# Patient Record
Sex: MALE | Race: WHITE | NOT HISPANIC OR LATINO | URBAN - METROPOLITAN AREA
[De-identification: names, ages, dates, MRNs, and addresses within clinical notes are randomized per-mention and may not be internally consistent; named-entity substitution may affect disease eponyms.]

---

## 2023-10-22 ENCOUNTER — INPATIENT (INPATIENT)
Facility: HOSPITAL | Age: 78
LOS: 8 days | Discharge: NURSING, REHAB OR LONG-TERM CARE | DRG: 853 | End: 2023-10-31
Attending: STUDENT IN AN ORGANIZED HEALTH CARE EDUCATION/TRAINING PROGRAM | Admitting: STUDENT IN AN ORGANIZED HEALTH CARE EDUCATION/TRAINING PROGRAM
Payer: MEDICARE

## 2023-10-22 VITALS
OXYGEN SATURATION: 100 % | WEIGHT: 185.19 LBS | DIASTOLIC BLOOD PRESSURE: 64 MMHG | HEART RATE: 72 BPM | RESPIRATION RATE: 24 BRPM | SYSTOLIC BLOOD PRESSURE: 129 MMHG

## 2023-10-22 DIAGNOSIS — N17.9 ACUTE KIDNEY FAILURE, UNSPECIFIED: ICD-10-CM

## 2023-10-22 LAB
A1C WITH ESTIMATED AVERAGE GLUCOSE RESULT: 5.9 % — HIGH (ref 4–5.6)
A1C WITH ESTIMATED AVERAGE GLUCOSE RESULT: 5.9 % — HIGH (ref 4–5.6)
ALBUMIN SERPL ELPH-MCNC: 2.5 G/DL — LOW (ref 3.5–5)
ALBUMIN SERPL ELPH-MCNC: 2.5 G/DL — LOW (ref 3.5–5)
ALBUMIN SERPL ELPH-MCNC: 3 G/DL — LOW (ref 3.5–5)
ALBUMIN SERPL ELPH-MCNC: 3 G/DL — LOW (ref 3.5–5)
ALP SERPL-CCNC: 85 U/L — SIGNIFICANT CHANGE UP (ref 40–120)
ALP SERPL-CCNC: 85 U/L — SIGNIFICANT CHANGE UP (ref 40–120)
ALP SERPL-CCNC: 89 U/L — SIGNIFICANT CHANGE UP (ref 40–120)
ALP SERPL-CCNC: 89 U/L — SIGNIFICANT CHANGE UP (ref 40–120)
ALT FLD-CCNC: 37 U/L DA — SIGNIFICANT CHANGE UP (ref 10–60)
ALT FLD-CCNC: 37 U/L DA — SIGNIFICANT CHANGE UP (ref 10–60)
ALT FLD-CCNC: 49 U/L DA — SIGNIFICANT CHANGE UP (ref 10–60)
ALT FLD-CCNC: 49 U/L DA — SIGNIFICANT CHANGE UP (ref 10–60)
AMPHET UR-MCNC: NEGATIVE — SIGNIFICANT CHANGE UP
AMPHET UR-MCNC: NEGATIVE — SIGNIFICANT CHANGE UP
ANION GAP SERPL CALC-SCNC: 24 MMOL/L — HIGH (ref 5–17)
ANION GAP SERPL CALC-SCNC: 24 MMOL/L — HIGH (ref 5–17)
ANION GAP SERPL CALC-SCNC: 32 MMOL/L — HIGH (ref 5–17)
APPEARANCE UR: CLEAR — SIGNIFICANT CHANGE UP
APPEARANCE UR: CLEAR — SIGNIFICANT CHANGE UP
APTT 50/50 2HOUR INCUB: 32.9 SEC — SIGNIFICANT CHANGE UP (ref 24.5–36.6)
APTT 50/50 2HOUR INCUB: 32.9 SEC — SIGNIFICANT CHANGE UP (ref 24.5–36.6)
APTT BLD: 32.4 SEC — SIGNIFICANT CHANGE UP (ref 24.5–36.6)
APTT BLD: 32.4 SEC — SIGNIFICANT CHANGE UP (ref 24.5–36.6)
APTT BLD: 36.4 SEC — HIGH (ref 24.5–35.6)
APTT BLD: 36.4 SEC — HIGH (ref 24.5–35.6)
APTT BLD: 36.8 SEC — HIGH (ref 24.5–35.6)
APTT BLD: 36.8 SEC — HIGH (ref 24.5–35.6)
APTT BLD: 37.5 SEC — HIGH (ref 24.5–35.6)
APTT BLD: 37.5 SEC — HIGH (ref 24.5–35.6)
AST SERPL-CCNC: 31 U/L — SIGNIFICANT CHANGE UP (ref 10–40)
AST SERPL-CCNC: 31 U/L — SIGNIFICANT CHANGE UP (ref 10–40)
AST SERPL-CCNC: 54 U/L — HIGH (ref 10–40)
AST SERPL-CCNC: 54 U/L — HIGH (ref 10–40)
BARBITURATES UR SCN-MCNC: NEGATIVE — SIGNIFICANT CHANGE UP
BARBITURATES UR SCN-MCNC: NEGATIVE — SIGNIFICANT CHANGE UP
BASE EXCESS BLDA CALC-SCNC: -7.3 MMOL/L — LOW (ref -2–3)
BASE EXCESS BLDA CALC-SCNC: -7.3 MMOL/L — LOW (ref -2–3)
BASE EXCESS BLDV CALC-SCNC: -18.7 MMOL/L — SIGNIFICANT CHANGE UP
BASE EXCESS BLDV CALC-SCNC: -18.7 MMOL/L — SIGNIFICANT CHANGE UP
BASE EXCESS BLDV CALC-SCNC: -23.3 MMOL/L — SIGNIFICANT CHANGE UP
BASE EXCESS BLDV CALC-SCNC: -23.3 MMOL/L — SIGNIFICANT CHANGE UP
BASE EXCESS BLDV CALC-SCNC: SIGNIFICANT CHANGE UP MMOL/L
BASE EXCESS BLDV CALC-SCNC: SIGNIFICANT CHANGE UP MMOL/L
BASOPHILS # BLD AUTO: 0.03 K/UL — SIGNIFICANT CHANGE UP (ref 0–0.2)
BASOPHILS # BLD AUTO: 0.03 K/UL — SIGNIFICANT CHANGE UP (ref 0–0.2)
BASOPHILS NFR BLD AUTO: 0.2 % — SIGNIFICANT CHANGE UP (ref 0–2)
BASOPHILS NFR BLD AUTO: 0.2 % — SIGNIFICANT CHANGE UP (ref 0–2)
BENZODIAZ UR-MCNC: NEGATIVE — SIGNIFICANT CHANGE UP
BENZODIAZ UR-MCNC: NEGATIVE — SIGNIFICANT CHANGE UP
BILIRUB SERPL-MCNC: 0.4 MG/DL — SIGNIFICANT CHANGE UP (ref 0.2–1.2)
BILIRUB SERPL-MCNC: 0.4 MG/DL — SIGNIFICANT CHANGE UP (ref 0.2–1.2)
BILIRUB SERPL-MCNC: 0.6 MG/DL — SIGNIFICANT CHANGE UP (ref 0.2–1.2)
BILIRUB SERPL-MCNC: 0.6 MG/DL — SIGNIFICANT CHANGE UP (ref 0.2–1.2)
BILIRUB UR-MCNC: NEGATIVE — SIGNIFICANT CHANGE UP
BILIRUB UR-MCNC: NEGATIVE — SIGNIFICANT CHANGE UP
BLD GP AB SCN SERPL QL: SIGNIFICANT CHANGE UP
BLD GP AB SCN SERPL QL: SIGNIFICANT CHANGE UP
BLOOD GAS COMMENTS ARTERIAL: SIGNIFICANT CHANGE UP
BLOOD GAS COMMENTS ARTERIAL: SIGNIFICANT CHANGE UP
BUN SERPL-MCNC: 121 MG/DL — HIGH (ref 7–18)
BUN SERPL-MCNC: 121 MG/DL — HIGH (ref 7–18)
BUN SERPL-MCNC: 206 MG/DL — HIGH (ref 7–18)
BUN SERPL-MCNC: 206 MG/DL — HIGH (ref 7–18)
BUN SERPL-MCNC: 216 MG/DL — HIGH (ref 7–18)
BUN SERPL-MCNC: 216 MG/DL — HIGH (ref 7–18)
CA-I SERPL-SCNC: SIGNIFICANT CHANGE UP MMOL/L (ref 1.15–1.33)
CA-I SERPL-SCNC: SIGNIFICANT CHANGE UP MMOL/L (ref 1.15–1.33)
CALCIUM SERPL-MCNC: 8.2 MG/DL — LOW (ref 8.4–10.5)
CHLORIDE SERPL-SCNC: 104 MMOL/L — SIGNIFICANT CHANGE UP (ref 96–108)
CHLORIDE SERPL-SCNC: 104 MMOL/L — SIGNIFICANT CHANGE UP (ref 96–108)
CHLORIDE SERPL-SCNC: 95 MMOL/L — LOW (ref 96–108)
CHLORIDE SERPL-SCNC: 95 MMOL/L — LOW (ref 96–108)
CHLORIDE SERPL-SCNC: 96 MMOL/L — SIGNIFICANT CHANGE UP (ref 96–108)
CHLORIDE SERPL-SCNC: 96 MMOL/L — SIGNIFICANT CHANGE UP (ref 96–108)
CO2 SERPL-SCNC: 15 MMOL/L — LOW (ref 22–31)
CO2 SERPL-SCNC: 15 MMOL/L — LOW (ref 22–31)
CO2 SERPL-SCNC: 3 MMOL/L — CRITICAL LOW (ref 22–31)
CO2 SERPL-SCNC: 3 MMOL/L — CRITICAL LOW (ref 22–31)
CO2 SERPL-SCNC: 5 MMOL/L — CRITICAL LOW (ref 22–31)
CO2 SERPL-SCNC: 5 MMOL/L — CRITICAL LOW (ref 22–31)
COCAINE METAB.OTHER UR-MCNC: NEGATIVE — SIGNIFICANT CHANGE UP
COCAINE METAB.OTHER UR-MCNC: NEGATIVE — SIGNIFICANT CHANGE UP
COLOR SPEC: YELLOW — SIGNIFICANT CHANGE UP
COLOR SPEC: YELLOW — SIGNIFICANT CHANGE UP
CREAT SERPL-MCNC: 11.8 MG/DL — HIGH (ref 0.5–1.3)
CREAT SERPL-MCNC: 11.8 MG/DL — HIGH (ref 0.5–1.3)
CREAT SERPL-MCNC: 12.6 MG/DL — HIGH (ref 0.5–1.3)
CREAT SERPL-MCNC: 12.6 MG/DL — HIGH (ref 0.5–1.3)
CREAT SERPL-MCNC: 6.36 MG/DL — HIGH (ref 0.5–1.3)
CREAT SERPL-MCNC: 6.36 MG/DL — HIGH (ref 0.5–1.3)
CRP SERPL-MCNC: 109 MG/L — HIGH
CRP SERPL-MCNC: 109 MG/L — HIGH
DIFF PNL FLD: ABNORMAL
DIFF PNL FLD: ABNORMAL
EGFR: 4 ML/MIN/1.73M2 — LOW
EGFR: 8 ML/MIN/1.73M2 — LOW
EGFR: 8 ML/MIN/1.73M2 — LOW
EOSINOPHIL # BLD AUTO: 0 K/UL — SIGNIFICANT CHANGE UP (ref 0–0.5)
EOSINOPHIL # BLD AUTO: 0 K/UL — SIGNIFICANT CHANGE UP (ref 0–0.5)
EOSINOPHIL NFR BLD AUTO: 0 % — SIGNIFICANT CHANGE UP (ref 0–6)
EOSINOPHIL NFR BLD AUTO: 0 % — SIGNIFICANT CHANGE UP (ref 0–6)
ERYTHROCYTE [SEDIMENTATION RATE] IN BLOOD: 27 MM/HR — HIGH (ref 0–20)
ERYTHROCYTE [SEDIMENTATION RATE] IN BLOOD: 27 MM/HR — HIGH (ref 0–20)
ESTIMATED AVERAGE GLUCOSE: 123 MG/DL — HIGH (ref 68–114)
ESTIMATED AVERAGE GLUCOSE: 123 MG/DL — HIGH (ref 68–114)
GAS PNL BLDA: SIGNIFICANT CHANGE UP
GAS PNL BLDV: 129 MMOL/L — LOW (ref 136–145)
GAS PNL BLDV: 129 MMOL/L — LOW (ref 136–145)
GAS PNL BLDV: SIGNIFICANT CHANGE UP
GLUCOSE SERPL-MCNC: 110 MG/DL — HIGH (ref 70–99)
GLUCOSE SERPL-MCNC: 110 MG/DL — HIGH (ref 70–99)
GLUCOSE SERPL-MCNC: 217 MG/DL — HIGH (ref 70–99)
GLUCOSE SERPL-MCNC: 217 MG/DL — HIGH (ref 70–99)
GLUCOSE SERPL-MCNC: 241 MG/DL — HIGH (ref 70–99)
GLUCOSE SERPL-MCNC: 241 MG/DL — HIGH (ref 70–99)
GLUCOSE UR QL: NEGATIVE MG/DL — SIGNIFICANT CHANGE UP
GLUCOSE UR QL: NEGATIVE MG/DL — SIGNIFICANT CHANGE UP
HAV IGM SER-ACNC: SIGNIFICANT CHANGE UP
HAV IGM SER-ACNC: SIGNIFICANT CHANGE UP
HBV CORE IGM SER-ACNC: SIGNIFICANT CHANGE UP
HBV CORE IGM SER-ACNC: SIGNIFICANT CHANGE UP
HBV SURFACE AG SER-ACNC: SIGNIFICANT CHANGE UP
HBV SURFACE AG SER-ACNC: SIGNIFICANT CHANGE UP
HCO3 BLDA-SCNC: 13 MMOL/L — LOW (ref 21–28)
HCO3 BLDA-SCNC: 13 MMOL/L — LOW (ref 21–28)
HCO3 BLDV-SCNC: 6 MMOL/L — CRITICAL LOW (ref 22–29)
HCO3 BLDV-SCNC: 6 MMOL/L — CRITICAL LOW (ref 22–29)
HCO3 BLDV-SCNC: 8 MMOL/L — CRITICAL LOW (ref 22–29)
HCO3 BLDV-SCNC: 8 MMOL/L — CRITICAL LOW (ref 22–29)
HCO3 BLDV-SCNC: SIGNIFICANT CHANGE UP MMOL/L (ref 22–29)
HCO3 BLDV-SCNC: SIGNIFICANT CHANGE UP MMOL/L (ref 22–29)
HCT VFR BLD CALC: 28.8 % — LOW (ref 39–50)
HCT VFR BLD CALC: 28.8 % — LOW (ref 39–50)
HCT VFR BLD CALC: 34 % — LOW (ref 39–50)
HCT VFR BLD CALC: 34 % — LOW (ref 39–50)
HCV AB S/CO SERPL IA: 0.1 S/CO — SIGNIFICANT CHANGE UP (ref 0–0.99)
HCV AB S/CO SERPL IA: 0.1 S/CO — SIGNIFICANT CHANGE UP (ref 0–0.99)
HCV AB SERPL-IMP: SIGNIFICANT CHANGE UP
HCV AB SERPL-IMP: SIGNIFICANT CHANGE UP
HGB BLD-MCNC: 10.3 G/DL — LOW (ref 13–17)
HGB BLD-MCNC: 10.3 G/DL — LOW (ref 13–17)
HGB BLD-MCNC: 11.2 G/DL — LOW (ref 13–17)
HGB BLD-MCNC: 11.2 G/DL — LOW (ref 13–17)
HIV 1+2 AB+HIV1 P24 AG SERPL QL IA: SIGNIFICANT CHANGE UP
HIV 1+2 AB+HIV1 P24 AG SERPL QL IA: SIGNIFICANT CHANGE UP
HOROWITZ INDEX BLDA+IHG-RTO: 21 — SIGNIFICANT CHANGE UP
HOROWITZ INDEX BLDA+IHG-RTO: 21 — SIGNIFICANT CHANGE UP
IMM GRANULOCYTES NFR BLD AUTO: 1.7 % — HIGH (ref 0–0.9)
IMM GRANULOCYTES NFR BLD AUTO: 1.7 % — HIGH (ref 0–0.9)
INR BLD: 1.28 RATIO — HIGH (ref 0.85–1.18)
INR BLD: 1.28 RATIO — HIGH (ref 0.85–1.18)
KETONES UR-MCNC: NEGATIVE MG/DL — SIGNIFICANT CHANGE UP
KETONES UR-MCNC: NEGATIVE MG/DL — SIGNIFICANT CHANGE UP
LACTATE BLDV-MCNC: 7.3 MMOL/L — CRITICAL HIGH (ref 0.5–2)
LACTATE BLDV-MCNC: 7.3 MMOL/L — CRITICAL HIGH (ref 0.5–2)
LACTATE SERPL-SCNC: 3.1 MMOL/L — HIGH (ref 0.7–2)
LACTATE SERPL-SCNC: 3.1 MMOL/L — HIGH (ref 0.7–2)
LACTATE SERPL-SCNC: 3.8 MMOL/L — HIGH (ref 0.7–2)
LACTATE SERPL-SCNC: 3.8 MMOL/L — HIGH (ref 0.7–2)
LACTATE SERPL-SCNC: 7.5 MMOL/L — CRITICAL HIGH (ref 0.7–2)
LACTATE SERPL-SCNC: 7.5 MMOL/L — CRITICAL HIGH (ref 0.7–2)
LEUKOCYTE ESTERASE UR-ACNC: ABNORMAL
LEUKOCYTE ESTERASE UR-ACNC: ABNORMAL
LYMPHOCYTES # BLD AUTO: 0.59 K/UL — LOW (ref 1–3.3)
LYMPHOCYTES # BLD AUTO: 0.59 K/UL — LOW (ref 1–3.3)
LYMPHOCYTES # BLD AUTO: 3 % — LOW (ref 13–44)
LYMPHOCYTES # BLD AUTO: 3 % — LOW (ref 13–44)
MCHC RBC-ENTMCNC: 28.7 PG — SIGNIFICANT CHANGE UP (ref 27–34)
MCHC RBC-ENTMCNC: 28.7 PG — SIGNIFICANT CHANGE UP (ref 27–34)
MCHC RBC-ENTMCNC: 28.8 PG — SIGNIFICANT CHANGE UP (ref 27–34)
MCHC RBC-ENTMCNC: 28.8 PG — SIGNIFICANT CHANGE UP (ref 27–34)
MCHC RBC-ENTMCNC: 32.9 GM/DL — SIGNIFICANT CHANGE UP (ref 32–36)
MCHC RBC-ENTMCNC: 32.9 GM/DL — SIGNIFICANT CHANGE UP (ref 32–36)
MCHC RBC-ENTMCNC: 35.8 GM/DL — SIGNIFICANT CHANGE UP (ref 32–36)
MCHC RBC-ENTMCNC: 35.8 GM/DL — SIGNIFICANT CHANGE UP (ref 32–36)
MCV RBC AUTO: 80.2 FL — SIGNIFICANT CHANGE UP (ref 80–100)
MCV RBC AUTO: 80.2 FL — SIGNIFICANT CHANGE UP (ref 80–100)
MCV RBC AUTO: 87.4 FL — SIGNIFICANT CHANGE UP (ref 80–100)
MCV RBC AUTO: 87.4 FL — SIGNIFICANT CHANGE UP (ref 80–100)
METHADONE UR-MCNC: NEGATIVE — SIGNIFICANT CHANGE UP
METHADONE UR-MCNC: NEGATIVE — SIGNIFICANT CHANGE UP
MONOCYTES # BLD AUTO: 0.6 K/UL — SIGNIFICANT CHANGE UP (ref 0–0.9)
MONOCYTES # BLD AUTO: 0.6 K/UL — SIGNIFICANT CHANGE UP (ref 0–0.9)
MONOCYTES NFR BLD AUTO: 3 % — SIGNIFICANT CHANGE UP (ref 2–14)
MONOCYTES NFR BLD AUTO: 3 % — SIGNIFICANT CHANGE UP (ref 2–14)
MRSA PCR RESULT.: SIGNIFICANT CHANGE UP
MRSA PCR RESULT.: SIGNIFICANT CHANGE UP
NEUTROPHILS # BLD AUTO: 18.41 K/UL — HIGH (ref 1.8–7.4)
NEUTROPHILS # BLD AUTO: 18.41 K/UL — HIGH (ref 1.8–7.4)
NEUTROPHILS NFR BLD AUTO: 92.1 % — HIGH (ref 43–77)
NEUTROPHILS NFR BLD AUTO: 92.1 % — HIGH (ref 43–77)
NITRITE UR-MCNC: NEGATIVE — SIGNIFICANT CHANGE UP
NITRITE UR-MCNC: NEGATIVE — SIGNIFICANT CHANGE UP
NRBC # BLD: 0 /100 WBCS — SIGNIFICANT CHANGE UP (ref 0–0)
OPIATES UR-MCNC: NEGATIVE — SIGNIFICANT CHANGE UP
OPIATES UR-MCNC: NEGATIVE — SIGNIFICANT CHANGE UP
OSMOLALITY SERPL: 375 MOSMOL/KG — HIGH (ref 280–301)
OSMOLALITY SERPL: 375 MOSMOL/KG — HIGH (ref 280–301)
OSMOLALITY UR: 361 MOS/KG — SIGNIFICANT CHANGE UP (ref 50–1200)
OSMOLALITY UR: 361 MOS/KG — SIGNIFICANT CHANGE UP (ref 50–1200)
PAT CTL 2H: 32 SEC — SIGNIFICANT CHANGE UP (ref 24.5–36.6)
PAT CTL 2H: 32 SEC — SIGNIFICANT CHANGE UP (ref 24.5–36.6)
PCO2 BLDA: 16 MMHG — LOW (ref 35–48)
PCO2 BLDA: 16 MMHG — LOW (ref 35–48)
PCO2 BLDV: 17 MMHG — LOW (ref 42–55)
PCO2 BLDV: 17 MMHG — LOW (ref 42–55)
PCO2 BLDV: 23 MMHG — LOW (ref 42–55)
PCP SPEC-MCNC: SIGNIFICANT CHANGE UP
PCP SPEC-MCNC: SIGNIFICANT CHANGE UP
PCP UR-MCNC: NEGATIVE — SIGNIFICANT CHANGE UP
PCP UR-MCNC: NEGATIVE — SIGNIFICANT CHANGE UP
PH BLDA: 7.51 — HIGH (ref 7.35–7.45)
PH BLDA: 7.51 — HIGH (ref 7.35–7.45)
PH BLDV: 7.03 — LOW (ref 7.32–7.43)
PH BLDV: 7.03 — LOW (ref 7.32–7.43)
PH BLDV: 7.16 — LOW (ref 7.32–7.43)
PH BLDV: 7.16 — LOW (ref 7.32–7.43)
PH BLDV: <7 — LOW (ref 7.32–7.43)
PH BLDV: <7 — LOW (ref 7.32–7.43)
PH UR: 5 — SIGNIFICANT CHANGE UP (ref 5–8)
PH UR: 5 — SIGNIFICANT CHANGE UP (ref 5–8)
PLATELET # BLD AUTO: 358 K/UL — SIGNIFICANT CHANGE UP (ref 150–400)
PLATELET # BLD AUTO: 358 K/UL — SIGNIFICANT CHANGE UP (ref 150–400)
PLATELET # BLD AUTO: 443 K/UL — HIGH (ref 150–400)
PLATELET # BLD AUTO: 443 K/UL — HIGH (ref 150–400)
PO2 BLDA: 120 MMHG — HIGH (ref 83–108)
PO2 BLDA: 120 MMHG — HIGH (ref 83–108)
PO2 BLDV: 49 MMHG — SIGNIFICANT CHANGE UP
PO2 BLDV: 49 MMHG — SIGNIFICANT CHANGE UP
PO2 BLDV: 50 MMHG — SIGNIFICANT CHANGE UP
PO2 BLDV: 50 MMHG — SIGNIFICANT CHANGE UP
PO2 BLDV: 51 MMHG — SIGNIFICANT CHANGE UP
PO2 BLDV: 51 MMHG — SIGNIFICANT CHANGE UP
POTASSIUM BLDV-SCNC: 6.8 MMOL/L — CRITICAL HIGH (ref 3.5–5.1)
POTASSIUM BLDV-SCNC: 6.8 MMOL/L — CRITICAL HIGH (ref 3.5–5.1)
POTASSIUM SERPL-MCNC: 3.9 MMOL/L — SIGNIFICANT CHANGE UP (ref 3.5–5.3)
POTASSIUM SERPL-MCNC: 3.9 MMOL/L — SIGNIFICANT CHANGE UP (ref 3.5–5.3)
POTASSIUM SERPL-MCNC: 6.3 MMOL/L — CRITICAL HIGH (ref 3.5–5.3)
POTASSIUM SERPL-MCNC: 6.3 MMOL/L — CRITICAL HIGH (ref 3.5–5.3)
POTASSIUM SERPL-MCNC: 6.4 MMOL/L — CRITICAL HIGH (ref 3.5–5.3)
POTASSIUM SERPL-MCNC: 6.4 MMOL/L — CRITICAL HIGH (ref 3.5–5.3)
POTASSIUM SERPL-SCNC: 3.9 MMOL/L — SIGNIFICANT CHANGE UP (ref 3.5–5.3)
POTASSIUM SERPL-SCNC: 3.9 MMOL/L — SIGNIFICANT CHANGE UP (ref 3.5–5.3)
POTASSIUM SERPL-SCNC: 6.3 MMOL/L — CRITICAL HIGH (ref 3.5–5.3)
POTASSIUM SERPL-SCNC: 6.3 MMOL/L — CRITICAL HIGH (ref 3.5–5.3)
POTASSIUM SERPL-SCNC: 6.4 MMOL/L — CRITICAL HIGH (ref 3.5–5.3)
POTASSIUM SERPL-SCNC: 6.4 MMOL/L — CRITICAL HIGH (ref 3.5–5.3)
POTASSIUM UR-SCNC: 25 MMOL/L — SIGNIFICANT CHANGE UP
POTASSIUM UR-SCNC: 25 MMOL/L — SIGNIFICANT CHANGE UP
PROT SERPL-MCNC: 5.7 G/DL — LOW (ref 6–8.3)
PROT SERPL-MCNC: 5.7 G/DL — LOW (ref 6–8.3)
PROT SERPL-MCNC: 6.8 G/DL — SIGNIFICANT CHANGE UP (ref 6–8.3)
PROT SERPL-MCNC: 6.8 G/DL — SIGNIFICANT CHANGE UP (ref 6–8.3)
PROT UR-MCNC: 30 MG/DL
PROT UR-MCNC: 30 MG/DL
PROTHROM AB SERPL-ACNC: 14.5 SEC — HIGH (ref 9.5–13)
PROTHROM AB SERPL-ACNC: 14.5 SEC — HIGH (ref 9.5–13)
RBC # BLD: 3.59 M/UL — LOW (ref 4.2–5.8)
RBC # BLD: 3.59 M/UL — LOW (ref 4.2–5.8)
RBC # BLD: 3.89 M/UL — LOW (ref 4.2–5.8)
RBC # BLD: 3.89 M/UL — LOW (ref 4.2–5.8)
RBC # FLD: 13.5 % — SIGNIFICANT CHANGE UP (ref 10.3–14.5)
RBC # FLD: 13.5 % — SIGNIFICANT CHANGE UP (ref 10.3–14.5)
RBC # FLD: 14.1 % — SIGNIFICANT CHANGE UP (ref 10.3–14.5)
RBC # FLD: 14.1 % — SIGNIFICANT CHANGE UP (ref 10.3–14.5)
S AUREUS DNA NOSE QL NAA+PROBE: SIGNIFICANT CHANGE UP
S AUREUS DNA NOSE QL NAA+PROBE: SIGNIFICANT CHANGE UP
SAO2 % BLDA: 100 % — SIGNIFICANT CHANGE UP
SAO2 % BLDA: 100 % — SIGNIFICANT CHANGE UP
SAO2 % BLDV: 70.1 % — SIGNIFICANT CHANGE UP
SAO2 % BLDV: 70.1 % — SIGNIFICANT CHANGE UP
SAO2 % BLDV: 73.6 % — SIGNIFICANT CHANGE UP
SAO2 % BLDV: 73.6 % — SIGNIFICANT CHANGE UP
SAO2 % BLDV: 81.1 % — SIGNIFICANT CHANGE UP
SAO2 % BLDV: 81.1 % — SIGNIFICANT CHANGE UP
SODIUM SERPL-SCNC: 131 MMOL/L — LOW (ref 135–145)
SODIUM SERPL-SCNC: 131 MMOL/L — LOW (ref 135–145)
SODIUM SERPL-SCNC: 132 MMOL/L — LOW (ref 135–145)
SODIUM SERPL-SCNC: 132 MMOL/L — LOW (ref 135–145)
SODIUM SERPL-SCNC: 143 MMOL/L — SIGNIFICANT CHANGE UP (ref 135–145)
SODIUM SERPL-SCNC: 143 MMOL/L — SIGNIFICANT CHANGE UP (ref 135–145)
SODIUM UR-SCNC: 27 MMOL/L — SIGNIFICANT CHANGE UP
SODIUM UR-SCNC: 27 MMOL/L — SIGNIFICANT CHANGE UP
SP GR SPEC: 1.01 — SIGNIFICANT CHANGE UP (ref 1–1.03)
SP GR SPEC: 1.01 — SIGNIFICANT CHANGE UP (ref 1–1.03)
THC UR QL: NEGATIVE — SIGNIFICANT CHANGE UP
THC UR QL: NEGATIVE — SIGNIFICANT CHANGE UP
TROPONIN I, HIGH SENSITIVITY RESULT: 23.2 NG/L — SIGNIFICANT CHANGE UP
TROPONIN I, HIGH SENSITIVITY RESULT: 23.2 NG/L — SIGNIFICANT CHANGE UP
UROBILINOGEN FLD QL: 0.2 MG/DL — SIGNIFICANT CHANGE UP (ref 0.2–1)
UROBILINOGEN FLD QL: 0.2 MG/DL — SIGNIFICANT CHANGE UP (ref 0.2–1)
UUN UR-MCNC: 581 MG/DL — SIGNIFICANT CHANGE UP
UUN UR-MCNC: 581 MG/DL — SIGNIFICANT CHANGE UP
WBC # BLD: 18.84 K/UL — HIGH (ref 3.8–10.5)
WBC # BLD: 18.84 K/UL — HIGH (ref 3.8–10.5)
WBC # BLD: 19.96 K/UL — HIGH (ref 3.8–10.5)
WBC # BLD: 19.96 K/UL — HIGH (ref 3.8–10.5)
WBC # FLD AUTO: 18.84 K/UL — HIGH (ref 3.8–10.5)
WBC # FLD AUTO: 18.84 K/UL — HIGH (ref 3.8–10.5)
WBC # FLD AUTO: 19.96 K/UL — HIGH (ref 3.8–10.5)
WBC # FLD AUTO: 19.96 K/UL — HIGH (ref 3.8–10.5)

## 2023-10-22 PROCEDURE — 70450 CT HEAD/BRAIN W/O DYE: CPT | Mod: 26,XU,MA

## 2023-10-22 PROCEDURE — 99222 1ST HOSP IP/OBS MODERATE 55: CPT

## 2023-10-22 PROCEDURE — 70496 CT ANGIOGRAPHY HEAD: CPT | Mod: 26,MA

## 2023-10-22 PROCEDURE — 70498 CT ANGIOGRAPHY NECK: CPT | Mod: 26,MA

## 2023-10-22 PROCEDURE — 71250 CT THORAX DX C-: CPT | Mod: 26

## 2023-10-22 PROCEDURE — 99291 CRITICAL CARE FIRST HOUR: CPT

## 2023-10-22 PROCEDURE — 71045 X-RAY EXAM CHEST 1 VIEW: CPT | Mod: 26

## 2023-10-22 PROCEDURE — 0042T: CPT | Mod: MA

## 2023-10-22 PROCEDURE — 74176 CT ABD & PELVIS W/O CONTRAST: CPT | Mod: 26

## 2023-10-22 RX ORDER — AMIODARONE HYDROCHLORIDE 400 MG/1
TABLET ORAL
Refills: 0 | Status: DISCONTINUED | OUTPATIENT
Start: 2023-10-22 | End: 2023-10-22

## 2023-10-22 RX ORDER — HEPARIN SODIUM 5000 [USP'U]/ML
6500 INJECTION INTRAVENOUS; SUBCUTANEOUS ONCE
Refills: 0 | Status: COMPLETED | OUTPATIENT
Start: 2023-10-22 | End: 2023-10-22

## 2023-10-22 RX ORDER — SODIUM CHLORIDE 9 MG/ML
1000 INJECTION INTRAMUSCULAR; INTRAVENOUS; SUBCUTANEOUS ONCE
Refills: 0 | Status: COMPLETED | OUTPATIENT
Start: 2023-10-22 | End: 2023-10-22

## 2023-10-22 RX ORDER — HEPARIN SODIUM 5000 [USP'U]/ML
INJECTION INTRAVENOUS; SUBCUTANEOUS
Qty: 25000 | Refills: 0 | Status: DISCONTINUED | OUTPATIENT
Start: 2023-10-22 | End: 2023-10-23

## 2023-10-22 RX ORDER — NOREPINEPHRINE BITARTRATE/D5W 8 MG/250ML
0.05 PLASTIC BAG, INJECTION (ML) INTRAVENOUS
Qty: 8 | Refills: 0 | Status: DISCONTINUED | OUTPATIENT
Start: 2023-10-22 | End: 2023-10-22

## 2023-10-22 RX ORDER — PIPERACILLIN AND TAZOBACTAM 4; .5 G/20ML; G/20ML
3.38 INJECTION, POWDER, LYOPHILIZED, FOR SOLUTION INTRAVENOUS EVERY 12 HOURS
Refills: 0 | Status: DISCONTINUED | OUTPATIENT
Start: 2023-10-23 | End: 2023-10-29

## 2023-10-22 RX ORDER — DEXTROSE 50 % IN WATER 50 %
50 SYRINGE (ML) INTRAVENOUS ONCE
Refills: 0 | Status: COMPLETED | OUTPATIENT
Start: 2023-10-22 | End: 2023-10-22

## 2023-10-22 RX ORDER — CALCIUM GLUCONATE 100 MG/ML
1 VIAL (ML) INTRAVENOUS ONCE
Refills: 0 | Status: COMPLETED | OUTPATIENT
Start: 2023-10-22 | End: 2023-10-22

## 2023-10-22 RX ORDER — PIPERACILLIN AND TAZOBACTAM 4; .5 G/20ML; G/20ML
3.38 INJECTION, POWDER, LYOPHILIZED, FOR SOLUTION INTRAVENOUS ONCE
Refills: 0 | Status: COMPLETED | OUTPATIENT
Start: 2023-10-22 | End: 2023-10-22

## 2023-10-22 RX ORDER — HEPARIN SODIUM 5000 [USP'U]/ML
3000 INJECTION INTRAVENOUS; SUBCUTANEOUS EVERY 6 HOURS
Refills: 0 | Status: DISCONTINUED | OUTPATIENT
Start: 2023-10-22 | End: 2023-10-23

## 2023-10-22 RX ORDER — AMIODARONE HYDROCHLORIDE 400 MG/1
1 TABLET ORAL
Qty: 450 | Refills: 0 | Status: DISCONTINUED | OUTPATIENT
Start: 2023-10-22 | End: 2023-10-23

## 2023-10-22 RX ORDER — AMIODARONE HYDROCHLORIDE 400 MG/1
150 TABLET ORAL ONCE
Refills: 0 | Status: COMPLETED | OUTPATIENT
Start: 2023-10-22 | End: 2023-10-22

## 2023-10-22 RX ORDER — INSULIN HUMAN 100 [IU]/ML
8 INJECTION, SOLUTION SUBCUTANEOUS ONCE
Refills: 0 | Status: COMPLETED | OUTPATIENT
Start: 2023-10-22 | End: 2023-10-22

## 2023-10-22 RX ORDER — CHLORHEXIDINE GLUCONATE 213 G/1000ML
1 SOLUTION TOPICAL
Refills: 0 | Status: DISCONTINUED | OUTPATIENT
Start: 2023-10-22 | End: 2023-10-24

## 2023-10-22 RX ORDER — SODIUM BICARBONATE 1 MEQ/ML
0.18 SYRINGE (ML) INTRAVENOUS
Qty: 150 | Refills: 0 | Status: DISCONTINUED | OUTPATIENT
Start: 2023-10-22 | End: 2023-10-22

## 2023-10-22 RX ORDER — HEPARIN SODIUM 5000 [USP'U]/ML
6500 INJECTION INTRAVENOUS; SUBCUTANEOUS EVERY 6 HOURS
Refills: 0 | Status: DISCONTINUED | OUTPATIENT
Start: 2023-10-22 | End: 2023-10-23

## 2023-10-22 RX ORDER — CALCIUM GLUCONATE 100 MG/ML
2 VIAL (ML) INTRAVENOUS ONCE
Refills: 0 | Status: COMPLETED | OUTPATIENT
Start: 2023-10-22 | End: 2023-10-22

## 2023-10-22 RX ORDER — NOREPINEPHRINE BITARTRATE/D5W 8 MG/250ML
0.05 PLASTIC BAG, INJECTION (ML) INTRAVENOUS
Qty: 16 | Refills: 0 | Status: DISCONTINUED | OUTPATIENT
Start: 2023-10-22 | End: 2023-10-23

## 2023-10-22 RX ORDER — AMIODARONE HYDROCHLORIDE 400 MG/1
400 TABLET ORAL EVERY 8 HOURS
Refills: 0 | Status: DISCONTINUED | OUTPATIENT
Start: 2023-10-22 | End: 2023-10-22

## 2023-10-22 RX ORDER — AMIODARONE HYDROCHLORIDE 400 MG/1
0.5 TABLET ORAL
Qty: 450 | Refills: 0 | Status: DISCONTINUED | OUTPATIENT
Start: 2023-10-22 | End: 2023-10-23

## 2023-10-22 RX ORDER — INSULIN HUMAN 100 [IU]/ML
5 INJECTION, SOLUTION SUBCUTANEOUS ONCE
Refills: 0 | Status: COMPLETED | OUTPATIENT
Start: 2023-10-22 | End: 2023-10-22

## 2023-10-22 RX ORDER — HEPARIN SODIUM 5000 [USP'U]/ML
5000 INJECTION INTRAVENOUS; SUBCUTANEOUS EVERY 8 HOURS
Refills: 0 | Status: DISCONTINUED | OUTPATIENT
Start: 2023-10-22 | End: 2023-10-22

## 2023-10-22 RX ORDER — SODIUM BICARBONATE 1 MEQ/ML
150 SYRINGE (ML) INTRAVENOUS ONCE
Refills: 0 | Status: COMPLETED | OUTPATIENT
Start: 2023-10-22 | End: 2023-10-22

## 2023-10-22 RX ORDER — SODIUM CHLORIDE 9 MG/ML
10 INJECTION INTRAMUSCULAR; INTRAVENOUS; SUBCUTANEOUS
Refills: 0 | Status: DISCONTINUED | OUTPATIENT
Start: 2023-10-22 | End: 2023-10-31

## 2023-10-22 RX ORDER — VASOPRESSIN 20 [USP'U]/ML
0.04 INJECTION INTRAVENOUS
Qty: 40 | Refills: 0 | Status: DISCONTINUED | OUTPATIENT
Start: 2023-10-22 | End: 2023-10-23

## 2023-10-22 RX ORDER — INSULIN LISPRO 100/ML
VIAL (ML) SUBCUTANEOUS EVERY 6 HOURS
Refills: 0 | Status: DISCONTINUED | OUTPATIENT
Start: 2023-10-22 | End: 2023-10-25

## 2023-10-22 RX ORDER — SODIUM CHLORIDE 9 MG/ML
100 INJECTION INTRAMUSCULAR; INTRAVENOUS; SUBCUTANEOUS
Refills: 0 | Status: DISCONTINUED | OUTPATIENT
Start: 2023-10-22 | End: 2023-10-31

## 2023-10-22 RX ORDER — SODIUM ZIRCONIUM CYCLOSILICATE 10 G/10G
10 POWDER, FOR SUSPENSION ORAL ONCE
Refills: 0 | Status: COMPLETED | OUTPATIENT
Start: 2023-10-22 | End: 2023-10-22

## 2023-10-22 RX ADMIN — Medication 75 MEQ/KG/HR: at 14:22

## 2023-10-22 RX ADMIN — Medication 3: at 17:38

## 2023-10-22 RX ADMIN — Medication 7.62 MICROGRAM(S)/KG/MIN: at 14:45

## 2023-10-22 RX ADMIN — PIPERACILLIN AND TAZOBACTAM 200 GRAM(S): 4; .5 INJECTION, POWDER, LYOPHILIZED, FOR SOLUTION INTRAVENOUS at 14:49

## 2023-10-22 RX ADMIN — SODIUM ZIRCONIUM CYCLOSILICATE 10 GRAM(S): 10 POWDER, FOR SUSPENSION ORAL at 13:02

## 2023-10-22 RX ADMIN — Medication 150 MILLIEQUIVALENT(S): at 13:02

## 2023-10-22 RX ADMIN — AMIODARONE HYDROCHLORIDE 600 MILLIGRAM(S): 400 TABLET ORAL at 20:37

## 2023-10-22 RX ADMIN — Medication 150 MILLIEQUIVALENT(S): at 17:36

## 2023-10-22 RX ADMIN — Medication 150 UNIT(S): at 15:30

## 2023-10-22 RX ADMIN — SODIUM CHLORIDE 1000 MILLILITER(S): 9 INJECTION INTRAMUSCULAR; INTRAVENOUS; SUBCUTANEOUS at 13:51

## 2023-10-22 RX ADMIN — INSULIN HUMAN 8 UNIT(S): 100 INJECTION, SOLUTION SUBCUTANEOUS at 12:36

## 2023-10-22 RX ADMIN — SODIUM CHLORIDE 1000 MILLILITER(S): 9 INJECTION INTRAMUSCULAR; INTRAVENOUS; SUBCUTANEOUS at 17:16

## 2023-10-22 RX ADMIN — AMIODARONE HYDROCHLORIDE 33.3 MG/MIN: 400 TABLET ORAL at 21:07

## 2023-10-22 RX ADMIN — SODIUM CHLORIDE 1000 MILLILITER(S): 9 INJECTION INTRAMUSCULAR; INTRAVENOUS; SUBCUTANEOUS at 14:22

## 2023-10-22 RX ADMIN — Medication 150 UNIT(S): at 15:29

## 2023-10-22 RX ADMIN — CHLORHEXIDINE GLUCONATE 1 APPLICATION(S): 213 SOLUTION TOPICAL at 13:51

## 2023-10-22 RX ADMIN — Medication 50 MILLILITER(S): at 14:55

## 2023-10-22 RX ADMIN — INSULIN HUMAN 5 UNIT(S): 100 INJECTION, SOLUTION SUBCUTANEOUS at 15:23

## 2023-10-22 RX ADMIN — Medication 200 GRAM(S): at 15:24

## 2023-10-22 RX ADMIN — VASOPRESSIN 6 UNIT(S)/MIN: 20 INJECTION INTRAVENOUS at 17:37

## 2023-10-22 RX ADMIN — Medication 2: at 23:55

## 2023-10-22 RX ADMIN — Medication 100 MEQ/KG/HR: at 17:38

## 2023-10-22 RX ADMIN — Medication 100 MEQ/KG/HR: at 17:14

## 2023-10-22 RX ADMIN — HEPARIN SODIUM 1500 UNIT(S)/HR: 5000 INJECTION INTRAVENOUS; SUBCUTANEOUS at 20:35

## 2023-10-22 RX ADMIN — Medication 100 GRAM(S): at 13:03

## 2023-10-22 RX ADMIN — PIPERACILLIN AND TAZOBACTAM 25 GRAM(S): 4; .5 INJECTION, POWDER, LYOPHILIZED, FOR SOLUTION INTRAVENOUS at 21:14

## 2023-10-22 RX ADMIN — Medication 150 MILLIEQUIVALENT(S): at 14:50

## 2023-10-22 RX ADMIN — Medication 3.81 MICROGRAM(S)/KG/MIN: at 20:31

## 2023-10-22 RX ADMIN — Medication 50 MILLILITER(S): at 12:37

## 2023-10-22 RX ADMIN — HEPARIN SODIUM 6500 UNIT(S): 5000 INJECTION INTRAVENOUS; SUBCUTANEOUS at 20:35

## 2023-10-22 NOTE — CHART NOTE - NSCHARTNOTEFT_GEN_A_CORE
Discussed with ICU NP that pt is unable to tolerate intubation/surgery in this acute setting- active ARF with K 6.4, BUN/Cr 216/11.8 and arterial CO2 12.          Subjective:  79 y/o male with DM type 2, HTN and BPH  endorsing abdominal discomfort.. Labs significant for acute renal failure with severe lactic acidosis and anion gap with hyperkalemia. CT abdomen/pelvis showing possible large bowel obstruction with incarcerated hernia.  Seen and examined at bed side . NGT in place- > 800 dark bilious        Vital Signs Last 24 Hrs  T(C): 35.6 (22 Oct 2023 13:59), Max: 36.5 (22 Oct 2023 11:53)  T(F): 96 (22 Oct 2023 13:59), Max: 97.7 (22 Oct 2023 11:53)  HR: 136 (22 Oct 2023 19:00) (71 - 138)  BP: 101/74 (22 Oct 2023 17:30) (74/43 - 129/64)  BP(mean): 85 (22 Oct 2023 17:30) (53 - 85)  RR: 18 (22 Oct 2023 19:00) (13 - 25)  SpO2: 97% (22 Oct 2023 19:00) (96% - 100%)    Parameters below as of 22 Oct 2023 14:00  Patient On (Oxygen Delivery Method): room air      I&O's Detail    22 Oct 2023 07:01  -  22 Oct 2023 19:39  --------------------------------------------------------  IN:    IV PiggyBack: 700 mL    Norepinephrine: 175.4 mL    Sodium Bicarbonate: 300 mL    Sodium Bicarbonate: 225 mL    Sodium Chloride 0.9% Bolus: 3000 mL    Vasopressin: 12 mL  Total IN: 4412.4 mL    OUT:    Indwelling Catheter - Urethral (mL): 2900 mL    Nasogastric/Oral tube (mL): 800 mL  Total OUT: 3700 mL    Total NET: 712.4 mL          Physical Exam:  General: NAD, resting comfortably in bed  Pulmonary: Nonlabored breathing, no respiratory distress  Cardiovascular: NSR, S1, S2  Abdominal: soft,  distended, soft, nontender to palpation diffusely   : Large L inguinal hernia attempted to manually reduced at bedside-- unsuccessful. Hernia continues to be soft, no overlying skin changes.  Extremities: no edema, no calf tenderness, distal pulses are palpable     LABS:                        11.2   19.96 )-----------( 443      ( 22 Oct 2023 11:25 )             34.0     10-22    132<L>  |  95<L>  |  216<H>  ----------------------------<  217<H>  6.4<HH>   |  5<LL>  |  11.80<H>    Ca    8.2<L>      22 Oct 2023 14:20    TPro  6.8  /  Alb  3.0<L>  /  TBili  0.4  /  DBili  x   /  AST  31  /  ALT  37  /  AlkPhos  89  10-22    LIVER FUNCTIONS - ( 22 Oct 2023 11:25 )  Alb: 3.0 g/dL / Pro: 6.8 g/dL / ALK PHOS: 89 U/L / ALT: 37 U/L DA / AST: 31 U/L / GGT: x             MEDICATIONS  (STANDING):  aMIOdarone IVPB 150 milliGRAM(s) IV Intermittent once  chlorhexidine 2% Cloths 1 Application(s) Topical <User Schedule>  heparin   Injectable 6500 Unit(s) IV Push once  heparin  Infusion.  Unit(s)/Hr (15 mL/Hr) IV Continuous <Continuous>  insulin lispro (ADMELOG) corrective regimen sliding scale   SubCutaneous every 6 hours  norepinephrine Infusion 0.05 MICROgram(s)/kG/Min (3.81 mL/Hr) IV Continuous <Continuous>  piperacillin/tazobactam IVPB.- 3.375 Gram(s) IV Intermittent once  sodium bicarbonate  Infusion 0.179 mEq/kG/Hr (100 mL/Hr) IV Continuous <Continuous>  vasopressin Infusion 0.04 Unit(s)/Min (6 mL/Hr) IV Continuous <Continuous>    MEDICATIONS  (PRN):  heparin   Injectable 6500 Unit(s) IV Push every 6 hours PRN For aPTT less than 40  heparin   Injectable 3000 Unit(s) IV Push every 6 hours PRN For aPTT between 40 - 57  sodium chloride 0.9% Bolus. 100 milliLiter(s) IV Bolus every 5 minutes PRN SBP LESS THAN or EQUAL to 90 mmHg  sodium chloride 0.9% lock flush 10 milliLiter(s) IV Push every 1 hour PRN Pre/post blood products, medications, blood draw, and to maintain line patency      Assessment:  79 y/o male a/w AMS/septic shock found to have large L inguinal hernia containing colon pt not surgical candidate in acute state.    - Lt Inguinal hernia manually reduced successfully   - monitor NG output  - serial abd exams  - stabilization/optimzation as per ICU team   -Will follow Subjective:  77 y/o male with DM type 2, HTN and BPH  endorsing abdominal discomfort.. Labs significant for acute renal failure with severe lactic acidosis and anion gap with hyperkalemia. CT abdomen/pelvis showing possible large bowel obstruction with incarcerated hernia.  Seen and examined at bed side . NGT in place- > 800 dark bilious        Vital Signs Last 24 Hrs  T(C): 35.6 (22 Oct 2023 13:59), Max: 36.5 (22 Oct 2023 11:53)  T(F): 96 (22 Oct 2023 13:59), Max: 97.7 (22 Oct 2023 11:53)  HR: 136 (22 Oct 2023 19:00) (71 - 138)  BP: 101/74 (22 Oct 2023 17:30) (74/43 - 129/64)  BP(mean): 85 (22 Oct 2023 17:30) (53 - 85)  RR: 18 (22 Oct 2023 19:00) (13 - 25)  SpO2: 97% (22 Oct 2023 19:00) (96% - 100%)    Parameters below as of 22 Oct 2023 14:00  Patient On (Oxygen Delivery Method): room air      I&O's Detail    22 Oct 2023 07:01  -  22 Oct 2023 19:39  --------------------------------------------------------  IN:    IV PiggyBack: 700 mL    Norepinephrine: 175.4 mL    Sodium Bicarbonate: 300 mL    Sodium Bicarbonate: 225 mL    Sodium Chloride 0.9% Bolus: 3000 mL    Vasopressin: 12 mL  Total IN: 4412.4 mL    OUT:    Indwelling Catheter - Urethral (mL): 2900 mL    Nasogastric/Oral tube (mL): 800 mL  Total OUT: 3700 mL    Total NET: 712.4 mL          Physical Exam:  General: NAD, resting comfortably in bed  Pulmonary: Nonlabored breathing, no respiratory distress  Cardiovascular: NSR, S1, S2  Abdominal: soft,  distended, soft, nontender to palpation diffusely   : Large L inguinal hernia attempted to manually reduced at bedside-- unsuccessful. Hernia continues to be soft, no overlying skin changes.  Extremities: no edema, no calf tenderness, distal pulses are palpable     LABS:                        11.2   19.96 )-----------( 443      ( 22 Oct 2023 11:25 )             34.0     10-22    132<L>  |  95<L>  |  216<H>  ----------------------------<  217<H>  6.4<HH>   |  5<LL>  |  11.80<H>    Ca    8.2<L>      22 Oct 2023 14:20    TPro  6.8  /  Alb  3.0<L>  /  TBili  0.4  /  DBili  x   /  AST  31  /  ALT  37  /  AlkPhos  89  10-22    LIVER FUNCTIONS - ( 22 Oct 2023 11:25 )  Alb: 3.0 g/dL / Pro: 6.8 g/dL / ALK PHOS: 89 U/L / ALT: 37 U/L DA / AST: 31 U/L / GGT: x             MEDICATIONS  (STANDING):  aMIOdarone IVPB 150 milliGRAM(s) IV Intermittent once  chlorhexidine 2% Cloths 1 Application(s) Topical <User Schedule>  heparin   Injectable 6500 Unit(s) IV Push once  heparin  Infusion.  Unit(s)/Hr (15 mL/Hr) IV Continuous <Continuous>  insulin lispro (ADMELOG) corrective regimen sliding scale   SubCutaneous every 6 hours  norepinephrine Infusion 0.05 MICROgram(s)/kG/Min (3.81 mL/Hr) IV Continuous <Continuous>  piperacillin/tazobactam IVPB.- 3.375 Gram(s) IV Intermittent once  sodium bicarbonate  Infusion 0.179 mEq/kG/Hr (100 mL/Hr) IV Continuous <Continuous>  vasopressin Infusion 0.04 Unit(s)/Min (6 mL/Hr) IV Continuous <Continuous>    MEDICATIONS  (PRN):  heparin   Injectable 6500 Unit(s) IV Push every 6 hours PRN For aPTT less than 40  heparin   Injectable 3000 Unit(s) IV Push every 6 hours PRN For aPTT between 40 - 57  sodium chloride 0.9% Bolus. 100 milliLiter(s) IV Bolus every 5 minutes PRN SBP LESS THAN or EQUAL to 90 mmHg  sodium chloride 0.9% lock flush 10 milliLiter(s) IV Push every 1 hour PRN Pre/post blood products, medications, blood draw, and to maintain line patency      Assessment:  77 y/o male a/w AMS/septic shock found to have large L inguinal hernia containing colon pt not surgical candidate in acute state.    - Lt Inguinal hernia manually reduced successfully   - monitor NG output  - serial abd exams  - stabilization/optimzation as per ICU team   -Will follow

## 2023-10-22 NOTE — CHART NOTE - NSCHARTNOTEFT_GEN_A_CORE
Patient noted to be tachycardic >110s. EKG done showed new-onset AFib. Echo ordered. Will continue to monitor. Cardiac enzymes with next set of labs post-HD.

## 2023-10-22 NOTE — H&P ADULT - HISTORY OF PRESENT ILLNESS
Patient is a 77 y/o M, lives alone at home, ambulates w/ a cane. He has PMHx of HTN, DM, BPH. He is a poor historian and collateral Hx was obtained from his daughter, Ms. Rosa Mcneil ([phone]) at bedside. He was trying to get off his bed when he fell down on the floor. He was found after an unspecified number of hours. He was noted to have slurred speech and altered mental status. Of note, he was noted to have been taking Ciprofloxacin for UTI. He was brought to the ED w/ VS /64, HR 72, RR 24, SO2 100% on 3L nasal cannula. NIHSS was 1. CTA Head/Neck (Stroke Protocol) was negative. EKG showed NSR w/ TWI. Labs were pertinent for WBC 19.96, K 6.3, AG 32, BUN/SCr 206/12.60. ABG showed He was given hyperkalemia cocktail (calcium gluconate + insulin/dextrose), 150 mg NaHCO3.

## 2023-10-22 NOTE — CONSULT NOTE ADULT - ASSESSMENT
AKIN:  Possible ATN, the risk being sepsis and possible shock.  He is not Oliguric but severe complications.  Not severely volume overloaded and is hypotensive.  - Though his BP is low, I think it is prudent to do urgent HD today and observe.  - Pressor support, targeting MAP > 65.  - 2 L NS if not given as yet.    Metabolic Acidosis:  High AG Metabolic Acidosis, likely Lactic Acidosis form septic shock. Liver function is still normal.  He was on Metformin also, so this may prolong resolution of the Acidosis.  - Continue IV HCO3.  - HD will be started as Metformin use may worsen Acidosis.    Hyperkalemia:  - HD shortly.    Incarcerated Hernia:  - Surgery follow up       AKIN:  Possible ATN, the risk being sepsis and possible shock.  He is not Oliguric but severe complications.  Not severely volume overloaded and is hypotensive.  - Though his BP is low, I think it is prudent to do urgent HD today and observe.  - Pressor support, targeting MAP > 65.  - 2 L NS if not given as yet.    Metabolic Acidosis:  High AG Metabolic Acidosis, likely Lactic Acidosis form septic shock +/- Metformin. Liver function is still normal.  He was on Metformin also, so this may prolong resolution of the Acidosis.  Calculated Osmolality is 347, intoxication is less likely.  - Continue IV HCO3.  - HD will be started as Metformin use may worsen Acidosis.    Hyperkalemia:  - HD shortly.    Incarcerated Hernia:  - Surgery follow up

## 2023-10-22 NOTE — CONSULT NOTE ADULT - SUBJECTIVE AND OBJECTIVE BOX
ANITA KOLB  Patient is a 78y old  Male who presents with a chief complaint of acute renal failure (22 Oct 2023 13:03)    HPI:  Patient is a 77 y/o M, lives alone at home, ambulates w/ a cane. He has PMHx of HTN, DM, BPH. He was found on the floor with slurred speech and had AKIN with High Anion Gap Metabolic Acidosis.  He is not oliguric but is hypotensive.    PAST MEDICAL & SURGICAL HISTORY:  Diabetes      History of hypertension      BPH (benign prostatic hypertrophy)      No significant past surgical history        MEDICATIONS  (STANDING):  chlorhexidine 2% Cloths 1 Application(s) Topical <User Schedule>  sodium bicarbonate  Infusion 0.134 mEq/kG/Hr (75 mL/Hr) IV Continuous <Continuous>    Allergies    No Known Drug Allergies  shellfish (Swelling (Moderate))  strawberry (Pruritus; Rash; Swelling (Moderate))    Intolerances      FAMILY HISTORY:  FH: leukemia (Child)    FH: heart disease (Mother)        REVIEW OF SYSTEMS  Not performed at this time.      Vital Signs Last 24 Hrs  T(C): 36.5 (22 Oct 2023 11:53), Max: 36.5 (22 Oct 2023 11:53)  T(F): 97.7 (22 Oct 2023 11:53), Max: 97.7 (22 Oct 2023 11:53)  HR: 88 (22 Oct 2023 13:59) (71 - 88)  BP: 92/44 (22 Oct 2023 13:59) (92/44 - 129/64)  BP(mean): 59 (22 Oct 2023 13:59) (59 - 59)  RR: 18 (22 Oct 2023 13:59) (18 - 25)  SpO2: 100% (22 Oct 2023 13:59) (100% - 100%)    Parameters below as of 22 Oct 2023 13:59  Patient On (Oxygen Delivery Method): room air        PHYSICAL EXAMINATION:  Constitutional:  He appears comfortable and not distressed. Not diaphoretic.    Neck:  The thyroid is normal. Trachea is midline.     Respiratory: The lungs are clear to auscultation. No dullness and expansion is normal.    Cardiovascular: S1 and S2 are normal. No murmurs, rubs or gallops are present.    Gastrointestinal: The abdomen is soft. No tenderness is present. No masses are present. Bowel sounds are normal.    Genitourinary: The bladder is not distended. No CVA tenderness is present. Left inguinal hernia.    Extremities: No edema is noted. No deformities are present.    Neurological: Awake but lethargic Dysathric. Tone, power and sensation are normal.     Skin: No lesions are seen  or palpated.    Lymph Nodes: No lymphadenopathy is present.                        11.2 19.96 )-----------( 443      ( 22 Oct 2023 11:25 )             34.0     10-22    131<L>  |  96  |  206<H>  ----------------------------<  110<H>  6.3<HH>   |  3<LL>  |  12.60<H>    Ca    8.2<L>      22 Oct 2023 11:25    TPro  6.8  /  Alb  3.0<L>  /  TBili  0.4  /  DBili  x   /  AST  31  /  ALT  37  /  AlkPhos  89  10-22    LIVER FUNCTIONS - ( 22 Oct 2023 11:25 )  Alb: 3.0 g/dL / Pro: 6.8 g/dL / ALK PHOS: 89 U/L / ALT: 37 U/L DA / AST: 31 U/L / GGT: x           < from: CT Abdomen and Pelvis No Cont (10.22.23 @ 13:15) >  KIDNEYS/URETERS: 14 mm hypodense right renal lesion, indeterminate. No   hydronephrosis.    BLADDER: Within normal limits. Rangel catheter balloon in the bladder   lumen.  REPRODUCTIVE ORGANS: Prostate is enlarged.    BOWEL: Dilated ascending and transverse colon; the distal transverse   colon descends into a large left inguinal hernia with the bowel narrowed   at the neck of the hernia. The sigmoid colon and rectum distal to the   hernia are normal in caliber. The small bowel is normal in caliber.   Appendix is normal.    < end of copied text >  < from: CT Brain Stroke Protocol (10.22.23 @ 11:09) >  IMPRESSION:  Mild chronic microvascular changes without evidence of an   acute transcortical infarction or hemorrhage. Findings discussed with Dr. Lebron at 11:12 AM.      < end of copied text >

## 2023-10-22 NOTE — H&P ADULT - NSHPPHYSICALEXAM_GEN_ALL_CORE
PHYSICAL EXAM:  GENERAL: NAD, speaks in full sentences, no signs of respiratory distress  HEAD:  Atraumatic, Normocephalic  EYES: EOMI, PERRLA, conjunctiva and sclera clear  NECK: Supple, No JVD  CHEST/LUNG: Clear to auscultation bilaterally; No wheeze; No crackles; No accessory muscles used  HEART: Regular rate and rhythm; No murmurs;   ABDOMEN: Soft, Nontender, Nondistended; Bowel sounds present; No guarding  EXTREMITIES:  2+ Peripheral Pulses, No cyanosis or edema  PSYCH: AAOx3  NEUROLOGY: non-focal  SKIN: No rashes or lesions 18 18 PHYSICAL EXAM:  GENERAL: NAD, (+) confused, no signs of respiratory distress  HEAD:  Atraumatic, Normocephalic  EYES: EOMI, PERRLA, conjunctiva and sclera clear  NECK: Supple, No JVD  CHEST/LUNG: Clear to auscultation bilaterally; No wheeze; No crackles; No accessory muscles used  HEART: Regular rate and rhythm; No murmurs;   ABDOMEN: Soft, Nontender, Nondistended; Bowel sounds present; No guarding  EXTREMITIES:  2+ Peripheral Pulses, No cyanosis or edema  PSYCH: AAOx3  NEUROLOGY: non-focal  SKIN: No rashes or lesions PHYSICAL EXAM:  GENERAL: NAD, (+) confused, no signs of respiratory distress  HEAD:  Atraumatic, Normocephalic  EYES: EOMI, PERRLA, conjunctiva and sclera clear  NECK: Supple, No JVD  CHEST/LUNG: Clear to auscultation bilaterally; No wheeze; No crackles; No accessory muscles used  HEART: Regular rate and rhythm; No murmurs;   ABDOMEN: Soft, (+) mild tenderness, Nondistended; Bowel sounds present; No guarding  EXTREMITIES:  2+ Peripheral Pulses, No cyanosis or edema  PSYCH: AAOx3  NEUROLOGY: non-focal  SKIN: No rashes or lesions

## 2023-10-22 NOTE — CHART NOTE - NSCHARTNOTEFT_GEN_A_CORE
Pt seen and examined at bedside  NGT in place- output 800 dark bilious  Pt awake, not oriented    Abdomen distended, soft, nontender to palpation diffusely   Large L inguinal hernia attempted to manually reduced at bedside-- unsuccessful. Hernia continues to be soft, no overlying skin changes.     Discussed with ICU NP that pt is unable to tolerate intubation/surgery in this acute setting- active ARF with K 6.4, BUN/Cr 216/11.8 and arterial CO2 12.    - pt is not surgical candidate in acute setting   - will attempt manual reduction again  - monitor NG output  - serial abd exams  - stabilization/optimzation as per ICU team   - remainder of care as per ICU   - updated and discussed with Dr. Toro

## 2023-10-22 NOTE — PROCEDURE NOTE - NSPROCDETAILS_GEN_ALL_CORE
location identified, draped/prepped, sterile technique used, needle inserted/introduced/positive blood return obtained via catheter/connected to a pressurized flush line/sutured in place/hemostasis with direct pressure, dressing applied/Seldinger technique/all materials/supplies accounted for at end of procedure
guidewire recovered/lumen(s) aspirated and flushed/sterile dressing applied/sterile technique, catheter placed/ultrasound guidance with use of sterile gel and probe cove
guidewire recovered/lumen(s) aspirated and flushed/sterile dressing applied/sterile technique, catheter placed/ultrasound guidance with use of sterile gel and probe cove
location identified, draped/prepped, sterile technique used/blood seen on insertion/dressing applied/flushes easily/secured in place/sterile technique, catheter placed

## 2023-10-22 NOTE — H&P ADULT - NSICDXFAMILYHX_GEN_ALL_CORE_FT
FAMILY HISTORY:  Mother  Still living? Unknown  FH: heart disease, Age at diagnosis: Age Unknown    Child  Still living? Unknown  FH: leukemia, Age at diagnosis: Age Unknown

## 2023-10-22 NOTE — PROCEDURE NOTE - NSPOSTPRCRAD_GEN_A_CORE
central line located in the superior vena cava/post-procedure radiography performed
central line located in the superior vena cava/depth of insertion/no pneumothorax/post-procedure radiography performed

## 2023-10-22 NOTE — PROCEDURE NOTE - NSASSISTBY_GEN_A_CORE
Dr. Villavicencio/Attending
NP Jaylen Escalante/GIUSEPPE
Jaylen Escalante/GIUSEPPE
Dr. Villavicencio/Attending

## 2023-10-22 NOTE — H&P ADULT - CONVERSATION DETAILS
spoke to the daughter, Ms. Rosa Mcneil ([phone]) at bedside. spoke to the daughter, Ms. Rosa Mcneil ([phone]) at bedside. patient has a HCP form at home. Will check GOC. FULL CODE for now. Spoke to the daughter, Ms. Rosa Mcneil ([phone]) at bedside. patient has a HCP form at home. Will check GOC. FULL CODE for now. Spoke to the daughter, Ms. Rosa Mcneil ([phone]) at bedside. patient has a HCP form at home. Will check GOC. Spoke to the daughter, Ms. Rosa Mcneil ([phone]) at bedside. Explained that patient is unstable to be transferred to [facility]. Daughter agreed to keep patient here and stablize. Patient has an HCP form at home. Daughter confirmed that patient is DNR/DNI. Family will bring advanced directive forms.

## 2023-10-22 NOTE — CONSULT NOTE ADULT - NS ATTEND AMEND GEN_ALL_CORE FT
Pt with known large L inguinal hernia found down by daughter for unclear amount of time (lives home alone and is A&O x3 at baseline but altered currently)  On imaging here pt found to have colon in the hernia w/ obstruction  No skin changes and hernia reducible (though comes back out given size)    - place NG tube  - monitor abd and inguinal exam  - monitor for bowel function  - likely needs dialysis given uremia, elevated K and lactic acidosis  - other management per ICU     Amarilis Toro MD  Attending physician

## 2023-10-22 NOTE — CONSULT NOTE ADULT - SUBJECTIVE AND OBJECTIVE BOX
GENERAL SURGERY CONSULT  79 y/o male with past medical history of DM, HTN, BPH BIBEMS with AMS after fall at home found to be uremic/septic brought to ICU for resuscitation. General surgery consulted for findings of large left inguinal hernia containing colon concerning for large bowel obstruction. Pt seen and examined at bedside. Pt is AxOx3 at baseline, today is AxOx1-2, unreliable historian. Collateral information obtained from daughter Rosa, at bedside. States pt was found this morning on the ground, unable to get up, after unknown amount of hours. States pt lives alone, ambulates with cane. Daughter admits to pt c/o abdominal pain with associated nausea/retching starting yesterday. Pt is AxOx2 during interview, admits to last bowel movement 5 days ago. Not passing flatus. Pt denies vomiting. Daughter states pt has known L inguinal hernia, has been putting off elective repair. States chronically bulging/visible through pants. Denies past abdominal surgeries.     PAST MEDICAL & SURGICAL HISTORY:  Diabetes  History of hypertension  BPH (benign prostatic hypertrophy)  No significant past surgical history    MEDICATIONS  (STANDING):  calcium gluconate IVPB 2 Gram(s) IV Intermittent once  chlorhexidine 2% Cloths 1 Application(s) Topical <User Schedule>  dextrose 50% Injectable 50 milliLiter(s) IV Push once  insulin regular  human recombinant 5 Unit(s) IV Push once  norepinephrine Infusion 0.05 MICROgram(s)/kG/Min (7.62 mL/Hr) IV Continuous <Continuous>  piperacillin/tazobactam IVPB. 3.375 Gram(s) IV Intermittent once  piperacillin/tazobactam IVPB.- 3.375 Gram(s) IV Intermittent once  sodium bicarbonate  Infusion 0.134 mEq/kG/Hr (75 mL/Hr) IV Continuous <Continuous>  sodium bicarbonate  Injectable 150 milliEquivalent(s) IV Push once    MEDICATIONS  (PRN):      Allergies    No Known Drug Allergies  shellfish (Swelling (Moderate))  strawberry (Pruritus; Rash; Swelling (Moderate))    Intolerances        Vital Signs Last 24 Hrs  T(C): 36.5 (22 Oct 2023 11:53), Max: 36.5 (22 Oct 2023 11:53)  T(F): 97.7 (22 Oct 2023 11:53), Max: 97.7 (22 Oct 2023 11:53)  HR: 88 (22 Oct 2023 13:59) (71 - 88)  BP: 92/44 (22 Oct 2023 13:59) (92/44 - 129/64)  BP(mean): 59 (22 Oct 2023 13:59) (59 - 59)  RR: 18 (22 Oct 2023 13:59) (18 - 25)  SpO2: 100% (22 Oct 2023 13:59) (100% - 100%)    Parameters below as of 22 Oct 2023 13:59  Patient On (Oxygen Delivery Method): room air        Physical:  Gen: A&Ox2. Moderate distress.  Resp: Unlabored breathing. Equal chest rise bilaterally.  Abdomen: Softly distended, +tympanic percussion. Nontender to palpation diffusely. No voluntary guarding, no rebound tenderness, no peritonitic signs.   Pelvis: significant swelling on L side. L inguinal/scrotal hernia noted. Soft to touch. Nontender to palpation/manipulation. No overlying skin changes. Manual reduction attempted at bedside.         I&O's Detail    22 Oct 2023 07:01  -  22 Oct 2023 14:33  --------------------------------------------------------  IN:    Sodium Chloride 0.9% Bolus: 1000 mL  Total IN: 1000 mL    OUT:    Indwelling Catheter - Urethral (mL): 2150 mL  Total OUT: 2150 mL    Total NET: -1150 mL          LABS:                        11.2   19.96 )-----------( 443      ( 22 Oct 2023 11:25 )             34.0              10    131<L>  |  96  |  206<H>  ----------------------------<  110<H>  6.3<HH>   |  3<LL>  |  12.60<H>    Ca    8.2<L>      22 Oct 2023 11:25    TPro  6.8  /  Alb  3.0<L>  /  TBili  0.4  /  DBili  x   /  AST  31  /  ALT  37  /  AlkPhos  89  10            PT/INR - ( 22 Oct 2023 11:25 )   PT: 14.5 sec;   INR: 1.28 ratio         PTT - ( 22 Oct 2023 11:25 )  PTT:37.5 sec  Urinalysis Basic - ( 22 Oct 2023 12:31 )    Color: Yellow / Appearance: Clear / S.012 / pH: x  Gluc: x / Ketone: Negative mg/dL  / Bili: Negative / Urobili: 0.2 mg/dL   Blood: x / Protein: 30 mg/dL / Nitrite: Negative   Leuk Esterase: Trace / RBC: 20 /HPF / WBC 10 /HPF   Sq Epi: x / Non Sq Epi: x / Bacteria: Many /HPF        RADIOLOGY & ADDITIONAL STUDIES:  < from: CT Abdomen and Pelvis No Cont (10.22.23 @ 13:15) >  ACC: [id number] EXAM:  CT ABDOMEN AND PELVIS   ORDERED BY: LLOYD NAVA     ACC: [id number] EXAM:  CT CHEST   ORDERED BY: CHRISTOPHER KENT     PROCEDURE DATE:  10/22/2023          INTERPRETATION:  CLINICAL INFORMATION: Abdominal pain, acute kidney injury    COMPARISON: None.    CONTRAST/COMPLICATIONS:  IV Contrast: NONE  Oral Contrast: NONE  Complications: None reported at time of study completion    PROCEDURE:  CT of the Chest, Abdomen and Pelvis was performed.  Sagittal and coronal reformats were performed.    FINDINGS:  CHEST:  LUNGS AND LARGE AIRWAYS: Patent central airways. No pulmonary nodules or   lung consolidation.  PLEURA: No pleural effusion.  VESSELS: Atherosclerotic changes of the aorta and coronary arteries.  HEART: Heart size is normal. No pericardial effusion. Aortic valvular   calcifications.  MEDIASTINUM AND MAXIMO: No lymphadenopathy.  CHEST WALL AND LOWER NECK: Within normal limits.    ABDOMEN AND PELVIS:  LIVER: 1.8 x 1.1 cm hypodense right hepatic lobe lesion which is   indeterminate.  BILE DUCTS: Normal caliber.  GALLBLADDER: Within normal limits.  SPLEEN: Within normal limits.  PANCREAS: Within normal limits.  ADRENALS: Within normal limits.  KIDNEYS/URETERS: 14 mm hypodense right renal lesion, indeterminate. No   hydronephrosis.    BLADDER: Within normal limits. Rangel catheter balloon in the bladder   lumen.  REPRODUCTIVE ORGANS: Prostate is enlarged.    BOWEL: Dilated ascending and transverse colon; the distal transverse   colon descends into a large left inguinal hernia with the bowel narrowed   at the neck of the hernia. The sigmoid colon and rectum distal to the   hernia are normal in caliber. The small bowel is normal in caliber.   Appendix is normal.  PERITONEUM: No ascites.  VESSELS: Atherosclerotic changes.  RETROPERITONEUM/LYMPH NODES: No lymphadenopathy.  ABDOMINAL WALL: Within normal limits.  BONES: Spinal degenerative changes.    IMPRESSION:  No evidence of pneumonia.  Large left inguinal hernia containing colon. There is dilatation of the   colon proximal to the hernia suggestive of large bowel obstruction.  Indeterminate hepatic and right renal lesions. Nonemergent MRI can be   performed to better characterize.    --- End of Report ---

## 2023-10-22 NOTE — ED ADULT NURSE NOTE - OBJECTIVE STATEMENT
Pt found by daughter on floor next to bed at 0930. EMS called. On arrival to ED pt alert and oriented x4. Moving all extremities. Reports his voice is not normal as it sounds slurred.

## 2023-10-22 NOTE — ED ADULT NURSE NOTE - NSFALLHARMRISKINTERV_ED_ALL_ED
Assistance OOB with selected safe patient handling equipment if applicable/Communicate risk of Fall with Harm to all staff, patient, and family/Monitor gait and stability/Provide patient with walking aids/Provide visual cue: red socks, yellow wristband, yellow gown, etc/Reinforce activity limits and safety measures with patient and family/Toileting schedule using arm’s reach rule for commode and bathroom/Bed in lowest position, wheels locked, appropriate side rails in place/Call bell, personal items and telephone in reach/Instruct patient to call for assistance before getting out of bed/chair/stretcher/Non-slip footwear applied when patient is off stretcher/[place] to call system/Physically safe environment - no spills, clutter or unnecessary equipment/Purposeful Proactive Rounding/Room/bathroom lighting operational, light cord in reach

## 2023-10-22 NOTE — PATIENT PROFILE ADULT - FALL HARM RISK - HARM RISK INTERVENTIONS

## 2023-10-22 NOTE — H&P ADULT - ASSESSMENT
_________CNS___________    #pain   #sedation    _________CVS___________    #pressors    _________ RESP__________    __________GI____________    ________ RENAL__________    _________MSK___________    __________ID____________    _________ENDO__________    _______HEME/ONC_______    _________SKIN____________    ________Prophylaxis_______  #DVT  #GI     __________GOC/ DISPO___________     Patient is a 79 y/o M, lives alone at home, ambulates w/ a cane. He has PMHx of HTN, DM, BPH. He is a poor historian and collateral Hx was obtained from his daughter, Ms. Rosa Mcneil ([phone]) at bedside. Admitted for acute renal failure    #Acute Renal Failure  #Metabolic Acidosis  #Hyperkalemia  #Septic Shock  #Acute Metabolic Encephalopathy  #Incarcerated Ventral Hernia / Large Bowel Obstruction  #Hypertension  #Type 2 Diabetes Mellitus  #Benign Prostatic Hyperplasia    _________CNS___________    #Acute Metabolic Encephalopathy  - baseline: AAOx3  - currently AAOx1 (confused)  - CTA H/N - neg CVA  - UTox - neg  - likely infectious vs. metabolic  - treat underlying cause    _________CVS___________    #Septic Shock  - elevated WBC 19, lactate 3.8 and hypotension  - s  - treated for UTI w/ Cipro outpatient  - UA+  - start Zosyn  - ID (Dr. Bravo) consulted  - f/u UCx, BCx    #Hypertension  - home meds - amlodipine, benazepril  - BP monitoring  - hold benazepril in the setting of AKIN  - hold amlodipine in setting of shock    _________ RESP__________    #no issues    __________GI____________    #Incarcerated Ventral Hernia  #Large Bowel Obstruction  - CT Abd done  - Surgery consulted    ________ RENAL__________    #Acute Renal Failure  - SCr (baseline) - 0.96  - BUN/Scr - 206/12.60  - FeNa - 3.2 (intrinsic)  - f/u ESR, CRP, HIV, Hepatitis, GEMMA, ANCA, C3, SPEP/UPEP  - monitor BMP Q4  - avoid nephrotoxic agents    #Metabolic Acidosis  - Lactate - 3.8  - AG 32  - s/p 150 mg NaHCO3  - started Bicarb drip  - monitor ABG    #Hyperkalemia  - K - 6.3  - EKG - NSR, TWI on anterior leads  - Trop - neg  - s/p Ca Gluc + hyperkalemia cocktail (insulin/dextrose + lokelma)  - monitor BMP    _________MSK___________    #Elevated CPK  - 466  - continue IVF hydration    __________ID____________    #Septic Shock  - see above    _________ENDO__________        _______HEME/ONC_______    _________SKIN____________    ________Prophylaxis_______  #DVT  #GI     __________GOC/ DISPO___________     Patient is a 79 y/o M, lives alone at home, ambulates w/ a cane. He has PMHx of HTN, DM, BPH. He is a poor historian and collateral Hx was obtained from his daughter, Ms. Rosa Mcneil ([phone]) at bedside. Admitted for acute renal failure    #Acute Renal Failure  #Metabolic Acidosis  #Hyperkalemia  #Septic Shock  #Acute Metabolic Encephalopathy  #Incarcerated Ventral Hernia / Large Bowel Obstruction  #Hypertension  #Type 2 Diabetes Mellitus  #Benign Prostatic Hyperplasia    _________CNS___________    #Acute Metabolic Encephalopathy  - baseline: AAOx3  - currently AAOx1 (confused)  - CTA H/N - neg CVA  - UTox - neg  - likely infectious vs. metabolic  - treat underlying cause    _________CVS___________    #Septic Shock  - elevated WBC 19, lactate 3.8 and hypotension  - s  - treated for UTI w/ Cipro outpatient  - UA+  - start Zosyn  - ID (Dr. Bravo) consulted  - f/u UCx, BCx    #Hypertension  - home meds - amlodipine, benazepril  - BP monitoring  - hold benazepril in the setting of AKIN  - hold amlodipine in setting of shock    _________ RESP__________    #no issues    __________GI____________    #Incarcerated Ventral Hernia  #Large Bowel Obstruction  - CT Abd done  - Surgery consulted    ________ RENAL__________    #Acute Renal Failure  - SCr (baseline) - 0.96  - BUN/Scr - 206/12.60  - FeNa - 3.2 (intrinsic)  - f/u ESR, CRP, HIV, Hepatitis, GEMMA, ANCA, C3, SPEP/UPEP  - monitor BMP Q4  - avoid nephrotoxic agents    #Metabolic Acidosis  - Lactate - 3.8  - AG 32  - s/p 150 mg NaHCO3  - started Bicarb drip  - monitor ABG    #Hyperkalemia  - K - 6.3  - EKG - NSR, TWI on anterior leads  - Trop - neg  - s/p Ca Gluc + hyperkalemia cocktail (insulin/dextrose + lokelma)  - monitor BMP    #BPH  - home meds - tamsulosin, finasteride  - hold home meds while NPO    _________MSK___________    #Elevated CPK  - 466  - continue IVF hydration    __________ID____________    #Septic Shock  - see above    _________ENDO__________    #DM  - home meds - metformin, glipizide  - hold PO meds  - start insulin sliding scale q6 while NPO  - AccuCheks Q6  - f/u a1c    _______HEME/ONC_______    #no issues    _________SKIN____________    #no issues    ________Prophylaxis_______    #DVT - heparin SQ     __________GOC/ DISPO___________    GOC -   DISPO - Admit to ICU Patient is a 77 y/o M, lives alone at home, ambulates w/ a cane. He has PMHx of HTN, DM, BPH. He is a poor historian and collateral Hx was obtained from his daughter, Ms. Rosa Mcneil ([phone]) at bedside. Admitted for acute renal failure    #Acute Renal Failure  #Metabolic Acidosis  #Hyperkalemia  #Septic Shock  #Acute Metabolic Encephalopathy  #Incarcerated Ventral Hernia / Large Bowel Obstruction  #Hypertension  #Type 2 Diabetes Mellitus  #Benign Prostatic Hyperplasia    _________CNS___________    #Acute Metabolic Encephalopathy  - baseline: AAOx3  - currently AAOx1 (confused)  - CTA H/N - neg CVA  - UTox - neg  - likely infectious vs. metabolic  - treat underlying cause    _________CVS___________    #Septic Shock  - elevated WBC 19, lactate 3.8 and hypotension  - treated for UTI w/ Cipro outpatient  - UA+  - start Zosyn  - ID (Dr. Bravo) consulted  - f/u UCx, BCx    #Hypertension  - home meds - amlodipine, benazepril  - BP monitoring  - hold benazepril in the setting of AKIN  - hold amlodipine in setting of shock    _________ RESP__________    #no issues    __________GI____________    #Incarcerated Ventral Hernia  #Large Bowel Obstruction  - CT Abd done  - NPO for now  - NGT to suction  - Surgery consulted - attempt for manual reduction    ________ RENAL__________    #Acute Renal Failure  - SCr (baseline) - 0.96  - BUN/Scr - 206/12.60  - FeNa - 3.2 (intrinsic)  - f/u ESR, CRP, HIV, Hepatitis, GEMMA, ANCA, C3, SPEP/UPEP  - monitor BMP Q4  - avoid nephrotoxic agents    #Metabolic Acidosis  - Lactate - 3.8  - AG 32  - s/p 150 mg NaHCO3 x 2  - started Bicarb drip  - monitor ABG    #Lactic Acidosis  - Lactate 3.8>7.5  - infection vs. ischemia 2/2 LBO  - s/p 2L NS bolus  - monitor lactate q2    #Hyperkalemia  - K - 6.3  - EKG - NSR, TWI on anterior leads  - Trop - neg  - s/p Ca Gluc + hyperkalemia cocktail (insulin/dextrose + lokelma)  - monitor BMP    #BPH  - home meds - tamsulosin, finasteride  - hold home meds while NPO    _________MSK___________    #Elevated CPK  - 466  - continue IVF hydration    __________ID____________    #Septic Shock  - see above    _________ENDO__________    #DM  - home meds - metformin, glipizide  - hold PO meds  - start insulin sliding scale q6 while NPO  - AccuCheks Q6  - f/u a1c    _______HEME/ONC_______    #no issues    _________SKIN____________    - L Ext dwell 10/22/2023  - R shiley 10/22/2023  - [facility] 10/22/2023  - place arterial line    ________Prophylaxis_______    #DVT - heparin SQ     __________GOC/ DISPO___________    GOC -   DISPO - Admit to ICU

## 2023-10-22 NOTE — ED PROVIDER NOTE - PROGRESS NOTE DETAILS
head CT negative.  Abdomen tender, wbc of 19.  Will  obtain CT abd/pelvis AKIN noted with creat of 12, potassium of 6.3, boicarb of 3.  hyperkalemia cocktail ordered.  ICU and nephrology called.      -patient accepted to ICU, awaiting call back from nephrology.

## 2023-10-22 NOTE — CONSULT NOTE ADULT - SUBJECTIVE AND OBJECTIVE BOX
HPI:  Patient is a 77 y/o M, lives alone at home, ambulates w/ a cane. He has PMHx of HTN, DM, BPH. He is a poor historian and collateral Hx was obtained from his daughter, Ms. Rosa Mcneil ([phone]) at bedside. He was trying to get off his bed when he fell down on the floor. He was found after an unspecified number of hours. He was noted to have slurred speech and altered mental status. Of note, he was noted to have been taking Ciprofloxacin for UTI. He was brought to the ED w/ VS /64, HR 72, RR 24, SO2 100% on 3L nasal cannula. NIHSS was 1. CTA Head/Neck (Stroke Protocol) was negative. EKG showed NSR w/ TWI. Labs were pertinent for WBC 19.96, K 6.3, AG 32, BUN/SCr 206/12.60. ABG showed He was given hyperkalemia cocktail (calcium gluconate + insulin/dextrose), 150 mg NaHCO3.  (22 Oct 2023 13:03)      PAST MEDICAL & SURGICAL HISTORY:  Diabetes      History of hypertension      BPH (benign prostatic hypertrophy)      No significant past surgical history          No Known Drug Allergies  shellfish (Swelling (Moderate))  strawberry (Pruritus; Rash; Swelling (Moderate))      Meds:  chlorhexidine 2% Cloths 1 Application(s) Topical <User Schedule>  insulin lispro (ADMELOG) corrective regimen sliding scale   SubCutaneous every 6 hours  norepinephrine Infusion 0.05 MICROgram(s)/kG/Min IV Continuous <Continuous>  piperacillin/tazobactam IVPB.- 3.375 Gram(s) IV Intermittent once  sodium bicarbonate  Infusion 0.134 mEq/kG/Hr IV Continuous <Continuous>  sodium chloride 0.9% Bolus 1000 milliLiter(s) IV Bolus once  sodium chloride 0.9% Bolus. 100 milliLiter(s) IV Bolus every 5 minutes PRN  sodium chloride 0.9% lock flush 10 milliLiter(s) IV Push every 1 hour PRN  vasopressin Infusion 0.04 Unit(s)/Min IV Continuous <Continuous>      SOCIAL HISTORY:  Smoker:  YES / NO        PACK YEARS:                         WHEN QUIT?  ETOH use:  YES / NO               FREQUENCY / QUANTITY:  Ilicit Drug use:  YES / NO  Occupation:  Assisted device use (Cane / Walker):  Live with:    FAMILY HISTORY:  FH: leukemia (Child)    FH: heart disease (Mother)        VITALS:  Vital Signs Last 24 Hrs  T(C): 36.5 (22 Oct 2023 11:53), Max: 36.5 (22 Oct 2023 11:53)  T(F): 97.7 (22 Oct 2023 11:53), Max: 97.7 (22 Oct 2023 11:53)  HR: 99 (22 Oct 2023 15:30) (71 - 102)  BP: 99/43 (22 Oct 2023 15:30) (74/43 - 129/64)  BP(mean): 59 (22 Oct 2023 15:30) (53 - 73)  RR: 16 (22 Oct 2023 15:30) (16 - 25)  SpO2: 100% (22 Oct 2023 15:30) (96% - 100%)    Parameters below as of 22 Oct 2023 14:00  Patient On (Oxygen Delivery Method): room air        LABS/DIAGNOSTIC TESTS:                          11.2   19.96 )-----------( 443      ( 22 Oct 2023 11:25 )             34.0     WBC Count: 19.96 K/uL (10-22 @ 11:25)      10-22    132<L>  |  95<L>  |  216<H>  ----------------------------<  217<H>  6.4<HH>   |  5<LL>  |  11.80<H>    Ca    8.2<L>      22 Oct 2023 14:20    TPro  6.8  /  Alb  3.0<L>  /  TBili  0.4  /  DBili  x   /  AST  31  /  ALT  37  /  AlkPhos  89  10-22      Urinalysis Basic - ( 22 Oct 2023 14:20 )    Color: x / Appearance: x / SG: x / pH: x  Gluc: 217 mg/dL / Ketone: x  / Bili: x / Urobili: x   Blood: x / Protein: x / Nitrite: x   Leuk Esterase: x / RBC: x / WBC x   Sq Epi: x / Non Sq Epi: x / Bacteria: x        LIVER FUNCTIONS - ( 22 Oct 2023 11:25 )  Alb: 3.0 g/dL / Pro: 6.8 g/dL / ALK PHOS: 89 U/L / ALT: 37 U/L DA / AST: 31 U/L / GGT: x             PT/INR - ( 22 Oct 2023 11:25 )   PT: 14.5 sec;   INR: 1.28 ratio         PTT - ( 22 Oct 2023 14:20 )  PTT:36.4 sec    LACTATE:Lactate, Blood: 7.5 mmol/L (10-22 @ 14:20)  Lactate, Blood: 3.8 mmol/L (10-22 @ 12:21)      ABG - ABG - ( 22 Oct 2023 12:26 )  pH, Arterial: <7.00 pH, Blood: x     /  pCO2: 7     /  pO2: 168   / HCO3: Incalculable / Base Excess: Incalculable /  SaO2: 100                 CULTURES:       RADIOLOGY:< from: CT Chest No Cont (10.22.23 @ 13:15) >    ACC: [id number] EXAM:  CT ABDOMEN AND PELVIS   ORDERED BY: LLOYD NAVA     ACC: [id number] EXAM:  CT CHEST   ORDERED BY: CHRISTOPHER KENT     PROCEDURE DATE:  10/22/2023          INTERPRETATION:  CLINICAL INFORMATION: Abdominal pain, acute kidney injury    COMPARISON: None.    CONTRAST/COMPLICATIONS:  IV Contrast: NONE  Oral Contrast: NONE  Complications: None reported at time of study completion    PROCEDURE:  CT of the Chest, Abdomen and Pelvis was performed.  Sagittal and coronal reformats were performed.    FINDINGS:  CHEST:  LUNGS AND LARGE AIRWAYS: Patent central airways. No pulmonary nodules or   lung consolidation.  PLEURA: No pleural effusion.  VESSELS: Atherosclerotic changes of the aorta and coronary arteries.  HEART: Heart size is normal. No pericardial effusion. Aortic valvular   calcifications.  MEDIASTINUM AND MAXIMO: No lymphadenopathy.  CHEST WALL AND LOWER NECK: Within normal limits.    ABDOMEN AND PELVIS:  LIVER: 1.8 x 1.1 cm hypodense right hepatic lobe lesion which is   indeterminate.  BILE DUCTS: Normal caliber.  GALLBLADDER: Within normal limits.  SPLEEN: Within normal limits.  PANCREAS: Within normal limits.  ADRENALS: Within normal limits.  KIDNEYS/URETERS: 14 mm hypodense right renal lesion, indeterminate. No   hydronephrosis.    BLADDER: Within normal limits. Rangel catheter balloon in the bladder   lumen.  REPRODUCTIVE ORGANS: Prostate is enlarged.    BOWEL: Dilated ascending and transverse colon; the distal transverse   colon descends into a large left inguinal hernia with the bowel narrowed   at the neck of the hernia. The sigmoid colon and rectum distal to the   hernia are normal in caliber. The small bowel is normal in caliber.   Appendix is normal.  PERITONEUM: No ascites.  VESSELS: Atherosclerotic changes.  RETROPERITONEUM/LYMPH NODES: No lymphadenopathy.  ABDOMINAL WALL: Within normal limits.  BONES: Spinal degenerative changes.    IMPRESSION:  No evidence of pneumonia.  Large left inguinal hernia containing colon. There is dilatation of the   colon proximal to the hernia suggestive of large bowel obstruction.  Indeterminate hepatic and right renal lesions. Nonemergent MRI can be   performed to better characterize.    --- End of Report ---            LYNNE CISNEROS MD; Attending Radiologist  This document has been electronically signed. Oct 22 2023  1:43PM    < end of copied text >  ------------------------------------------------------------------------------------------------------------------------------------------------  ACC: [id number] EXAM:  CT BRAIN STROKE PROTOCOL   ORDERED BY: LLOYD NAVA     PROCEDURE DATE:  10/22/2023          INTERPRETATION:  CLINICAL INDICATIONS:  Stroke Code, slurred speech    COMPARISON: None.    TECHNIQUE: Noncontrast CT of the head. Multiplanar reformations are   submitted. RAPID artificial intelligence was utilized for the preliminary   evaluation of intracranial hemorrhage.    FINDINGS:  There is periventricular and subcortical white matter hypodensity without   mass effect,nonspecific, likely representing mild chronic microvascular   ischemic changes. There is no compelling evidence for an acute   transcortical infarction. There is no evidence of mass, mass effect,   midline shift or extra-axial fluid collection. The lateral ventricles and   cortical sulci are age-appropriate in size and configuration. The orbits,   mastoid air cells and visualized paranasal sinuses are unremarkable. The   calvarium is intact. Consider MRI as clinically warranted.    IMPRESSION:  Mild chronic microvascular changes without evidence of an   acute transcortical infarction or hemorrhage. Findings discussed with Dr. Nava at 11:12 AM.    --- End of Report ---            YONI OLIVER MD; Attending Radiologist  This document has been electronically signed. Oct 22 2023 11:13AM    < end of copied text >        ROS  [  ] UNABLE TO ELICIT               HPI:  Patient is a 77 y/o M, lives alone at home, ambulates w/ a cane. He has PMHx of HTN, DM, BPH. He is a poor historian and collateral Hx was obtained from his daughter, Ms. Rosa Mcneil ([phone]) at bedside. He was trying to get off his bed when he fell down on the floor. He was found after an unspecified number of hours. He was noted to have slurred speech and altered mental status. Of note, he was noted to have been taking Ciprofloxacin for UTI. He was brought to the ED w/ VS /64, HR 72, RR 24, SO2 100% on 3L nasal cannula. NIHSS was 1. CTA Head/Neck (Stroke Protocol) was negative. EKG showed NSR w/ TWI. Labs were pertinent for WBC 19.96, K 6.3, AG 32, BUN/SCr 206/12.60. ABG showed He was given hyperkalemia cocktail (calcium gluconate + insulin/dextrose), 150 mg NaHCO3.  (22 Oct 2023 13:03)      PAST MEDICAL & SURGICAL HISTORY:  Diabetes      History of hypertension      BPH (benign prostatic hypertrophy)      No significant past surgical history          No Known Drug Allergies  shellfish (Swelling (Moderate))  strawberry (Pruritus; Rash; Swelling (Moderate))      Meds:  chlorhexidine 2% Cloths 1 Application(s) Topical <User Schedule>  insulin lispro (ADMELOG) corrective regimen sliding scale   SubCutaneous every 6 hours  norepinephrine Infusion 0.05 MICROgram(s)/kG/Min IV Continuous <Continuous>  piperacillin/tazobactam IVPB.- 3.375 Gram(s) IV Intermittent once  sodium bicarbonate  Infusion 0.134 mEq/kG/Hr IV Continuous <Continuous>  sodium chloride 0.9% Bolus 1000 milliLiter(s) IV Bolus once  sodium chloride 0.9% Bolus. 100 milliLiter(s) IV Bolus every 5 minutes PRN  sodium chloride 0.9% lock flush 10 milliLiter(s) IV Push every 1 hour PRN  vasopressin Infusion 0.04 Unit(s)/Min IV Continuous <Continuous>      SOCIAL HISTORY:  Smoker:  YES / NO        PACK YEARS:                         WHEN QUIT?  ETOH use:  YES / NO               FREQUENCY / QUANTITY:  Ilicit Drug use:  YES / NO  Occupation:  Assisted device use (Cane / Walker):  Live with:    FAMILY HISTORY:  FH: leukemia (Child)    FH: heart disease (Mother)        VITALS:  Vital Signs Last 24 Hrs  T(C): 36.5 (22 Oct 2023 11:53), Max: 36.5 (22 Oct 2023 11:53)  T(F): 97.7 (22 Oct 2023 11:53), Max: 97.7 (22 Oct 2023 11:53)  HR: 99 (22 Oct 2023 15:30) (71 - 102)  BP: 99/43 (22 Oct 2023 15:30) (74/43 - 129/64)  BP(mean): 59 (22 Oct 2023 15:30) (53 - 73)  RR: 16 (22 Oct 2023 15:30) (16 - 25)  SpO2: 100% (22 Oct 2023 15:30) (96% - 100%)    Parameters below as of 22 Oct 2023 14:00  Patient On (Oxygen Delivery Method): room air        LABS/DIAGNOSTIC TESTS:                          11.2   19.96 )-----------( 443      ( 22 Oct 2023 11:25 )             34.0     WBC Count: 19.96 K/uL (10-22 @ 11:25)      10-22    132<L>  |  95<L>  |  216<H>  ----------------------------<  217<H>  6.4<HH>   |  5<LL>  |  11.80<H>    Ca    8.2<L>      22 Oct 2023 14:20    TPro  6.8  /  Alb  3.0<L>  /  TBili  0.4  /  DBili  x   /  AST  31  /  ALT  37  /  AlkPhos  89  10-22      Urine Microscopic-Add On (NC) (10.22.23 @ 12:31)   Bacteria: Many /HPF  Comment - Urine: Moderate Amorphous Urates  Squamous Epithelial Cells: Present  Red Blood Cell - Urine: 20 /HPF  White Blood Cell - Urine: 10 /HPFUrinalysis (10.22.23 @ 12:31)   pH Urine: 5.0  Blood, Urine: Small  Glucose Qualitative, Urine: Negative mg/dL  Color: Yellow  Urine Appearance: Clear  Bilirubin: Negative  Ketone - Urine: Negative mg/dL  Specific Gravity: 1.012  Protein, Urine: 30 mg/dL  Urobilinogen: 0.2 mg/dL  Nitrite: Negative  Leukocyte Esterase Concentration: Trace      LIVER FUNCTIONS - ( 22 Oct 2023 11:25 )  Alb: 3.0 g/dL / Pro: 6.8 g/dL / ALK PHOS: 89 U/L / ALT: 37 U/L DA / AST: 31 U/L / GGT: x             PT/INR - ( 22 Oct 2023 11:25 )   PT: 14.5 sec;   INR: 1.28 ratio         PTT - ( 22 Oct 2023 14:20 )  PTT:36.4 sec    LACTATE:Lactate, Blood: 7.5 mmol/L (10-22 @ 14:20)  Lactate, Blood: 3.8 mmol/L (10-22 @ 12:21)      ABG - ABG - ( 22 Oct 2023 12:26 )  pH, Arterial: <7.00 pH, Blood: x     /  pCO2: 7     /  pO2: 168   / HCO3: Incalculable / Base Excess: Incalculable /  SaO2: 100                 CULTURES:       RADIOLOGY:< from: CT Chest No Cont (10.22.23 @ 13:15) >    ACC: [id number] EXAM:  CT ABDOMEN AND PELVIS   ORDERED BY: LLOYD NAVA     ACC: [id number] EXAM:  CT CHEST   ORDERED BY: CHRISTOPHER KENT     PROCEDURE DATE:  10/22/2023          INTERPRETATION:  CLINICAL INFORMATION: Abdominal pain, acute kidney injury    COMPARISON: None.    CONTRAST/COMPLICATIONS:  IV Contrast: NONE  Oral Contrast: NONE  Complications: None reported at time of study completion    PROCEDURE:  CT of the Chest, Abdomen and Pelvis was performed.  Sagittal and coronal reformats were performed.    FINDINGS:  CHEST:  LUNGS AND LARGE AIRWAYS: Patent central airways. No pulmonary nodules or   lung consolidation.  PLEURA: No pleural effusion.  VESSELS: Atherosclerotic changes of the aorta and coronary arteries.  HEART: Heart size is normal. No pericardial effusion. Aortic valvular   calcifications.  MEDIASTINUM AND MAXIMO: No lymphadenopathy.  CHEST WALL AND LOWER NECK: Within normal limits.    ABDOMEN AND PELVIS:  LIVER: 1.8 x 1.1 cm hypodense right hepatic lobe lesion which is   indeterminate.  BILE DUCTS: Normal caliber.  GALLBLADDER: Within normal limits.  SPLEEN: Within normal limits.  PANCREAS: Within normal limits.  ADRENALS: Within normal limits.  KIDNEYS/URETERS: 14 mm hypodense right renal lesion, indeterminate. No   hydronephrosis.    BLADDER: Within normal limits. Rangel catheter balloon in the bladder   lumen.  REPRODUCTIVE ORGANS: Prostate is enlarged.    BOWEL: Dilated ascending and transverse colon; the distal transverse   colon descends into a large left inguinal hernia with the bowel narrowed   at the neck of the hernia. The sigmoid colon and rectum distal to the   hernia are normal in caliber. The small bowel is normal in caliber.   Appendix is normal.  PERITONEUM: No ascites.  VESSELS: Atherosclerotic changes.  RETROPERITONEUM/LYMPH NODES: No lymphadenopathy.  ABDOMINAL WALL: Within normal limits.  BONES: Spinal degenerative changes.    IMPRESSION:  No evidence of pneumonia.  Large left inguinal hernia containing colon. There is dilatation of the   colon proximal to the hernia suggestive of large bowel obstruction.  Indeterminate hepatic and right renal lesions. Nonemergent MRI can be   performed to better characterize.    --- End of Report ---            LYNNE CISNEROS MD; Attending Radiologist  This document has been electronically signed. Oct 22 2023  1:43PM    < end of copied text >  ------------------------------------------------------------------------------------------------------------------------------------------------  ACC: [id number] EXAM:  CT BRAIN STROKE PROTOCOL   ORDERED BY: LLOYD NAVA     PROCEDURE DATE:  10/22/2023          INTERPRETATION:  CLINICAL INDICATIONS:  Stroke Code, slurred speech    COMPARISON: None.    TECHNIQUE: Noncontrast CT of the head. Multiplanar reformations are   submitted. RAPID artificial intelligence was utilized for the preliminary   evaluation of intracranial hemorrhage.    FINDINGS:  There is periventricular and subcortical white matter hypodensity without   mass effect,nonspecific, likely representing mild chronic microvascular   ischemic changes. There is no compelling evidence for an acute   transcortical infarction. There is no evidence of mass, mass effect,   midline shift or extra-axial fluid collection. The lateral ventricles and   cortical sulci are age-appropriate in size and configuration. The orbits,   mastoid air cells and visualized paranasal sinuses are unremarkable. The   calvarium is intact. Consider MRI as clinically warranted.    IMPRESSION:  Mild chronic microvascular changes without evidence of an   acute transcortical infarction or hemorrhage. Findings discussed with Dr. Nava at 11:12 AM.    --- End of Report ---            YONI OLIVER MD; Attending Radiologist  This document has been electronically signed. Oct 22 2023 11:13AM    < end of copied text >        ROS  [  ] UNABLE TO ELICIT               HPI:  Patient is a 77 y/o M, lives alone at home, ambulates w/ a cane. He has PMHx of HTN, DM, BPH. He is a poor historian and collateral Hx was obtained from his daughter, Ms. Rosa Mcneil ([phone]) at bedside. He was trying to get off his bed when he fell down on the floor. He was found after an unspecified number of hours. He was noted to have slurred speech and altered mental status. Of note, he was noted to have been taking Ciprofloxacin for UTI. He was brought to the ED w/ VS /64, HR 72, RR 24, SO2 100% on 3L nasal cannula. NIHSS was 1. CTA Head/Neck (Stroke Protocol) was negative. EKG showed NSR w/ TWI. Labs were pertinent for WBC 19.96, K 6.3, AG 32, BUN/SCr 206/12.60. ABG showed He was given hyperkalemia cocktail (calcium gluconate + insulin/dextrose), 150 mg NaHCO3.  (22 Oct 2023 13:03)          History as above, asked to see this patient who presented from home after being found on the floor , he fell and is confused and unable to give any history . He is in the ICU as he was found to have Septic Shock and is currently on 2 pressors           PAST MEDICAL & SURGICAL HISTORY:  Diabetes      History of hypertension      BPH (benign prostatic hypertrophy)      No significant past surgical history          No Known Drug Allergies  shellfish (Swelling (Moderate))  strawberry (Pruritus; Rash; Swelling (Moderate))      Meds:  chlorhexidine 2% Cloths 1 Application(s) Topical <User Schedule>  insulin lispro (ADMELOG) corrective regimen sliding scale   SubCutaneous every 6 hours  norepinephrine Infusion 0.05 MICROgram(s)/kG/Min IV Continuous <Continuous>  piperacillin/tazobactam IVPB.- 3.375 Gram(s) IV Intermittent once  sodium bicarbonate  Infusion 0.134 mEq/kG/Hr IV Continuous <Continuous>  sodium chloride 0.9% Bolus 1000 milliLiter(s) IV Bolus once  sodium chloride 0.9% Bolus. 100 milliLiter(s) IV Bolus every 5 minutes PRN  sodium chloride 0.9% lock flush 10 milliLiter(s) IV Push every 1 hour PRN  vasopressin Infusion 0.04 Unit(s)/Min IV Continuous <Continuous>      SOCIAL HISTORY:  Smoker:  YES / NO        PACK YEARS:                         WHEN QUIT?  ETOH use:  YES / NO               FREQUENCY / QUANTITY:  Ilicit Drug use:  YES / NO  Occupation:  Assisted device use (Cane / Walker):  Live with:    FAMILY HISTORY:  FH: leukemia (Child)    FH: heart disease (Mother)        VITALS:  Vital Signs Last 24 Hrs  T(C): 36.5 (22 Oct 2023 11:53), Max: 36.5 (22 Oct 2023 11:53)  T(F): 97.7 (22 Oct 2023 11:53), Max: 97.7 (22 Oct 2023 11:53)  HR: 99 (22 Oct 2023 15:30) (71 - 102)  BP: 99/43 (22 Oct 2023 15:30) (74/43 - 129/64)  BP(mean): 59 (22 Oct 2023 15:30) (53 - 73)  RR: 16 (22 Oct 2023 15:30) (16 - 25)  SpO2: 100% (22 Oct 2023 15:30) (96% - 100%)    Parameters below as of 22 Oct 2023 14:00  Patient On (Oxygen Delivery Method): room air        LABS/DIAGNOSTIC TESTS:                          11.2   19.96 )-----------( 443      ( 22 Oct 2023 11:25 )             34.0     WBC Count: 19.96 K/uL (10-22 @ 11:25)      10-22    132<L>  |  95<L>  |  216<H>  ----------------------------<  217<H>  6.4<HH>   |  5<LL>  |  11.80<H>    Ca    8.2<L>      22 Oct 2023 14:20    TPro  6.8  /  Alb  3.0<L>  /  TBili  0.4  /  DBili  x   /  AST  31  /  ALT  37  /  AlkPhos  89  10-22      Urine Microscopic-Add On (NC) (10.22.23 @ 12:31)   Bacteria: Many /HPF  Comment - Urine: Moderate Amorphous Urates  Squamous Epithelial Cells: Present  Red Blood Cell - Urine: 20 /HPF  White Blood Cell - Urine: 10 /HPFUrinalysis (10.22.23 @ 12:31)   pH Urine: 5.0  Blood, Urine: Small  Glucose Qualitative, Urine: Negative mg/dL  Color: Yellow  Urine Appearance: Clear  Bilirubin: Negative  Ketone - Urine: Negative mg/dL  Specific Gravity: 1.012  Protein, Urine: 30 mg/dL  Urobilinogen: 0.2 mg/dL  Nitrite: Negative  Leukocyte Esterase Concentration: Trace      LIVER FUNCTIONS - ( 22 Oct 2023 11:25 )  Alb: 3.0 g/dL / Pro: 6.8 g/dL / ALK PHOS: 89 U/L / ALT: 37 U/L DA / AST: 31 U/L / GGT: x             PT/INR - ( 22 Oct 2023 11:25 )   PT: 14.5 sec;   INR: 1.28 ratio         PTT - ( 22 Oct 2023 14:20 )  PTT:36.4 sec    LACTATE:Lactate, Blood: 7.5 mmol/L (10-22 @ 14:20)  Lactate, Blood: 3.8 mmol/L (10-22 @ 12:21)      ABG - ABG - ( 22 Oct 2023 12:26 )  pH, Arterial: <7.00 pH, Blood: x     /  pCO2: 7     /  pO2: 168   / HCO3: Incalculable / Base Excess: Incalculable /  SaO2: 100                 CULTURES:       RADIOLOGY:< from: CT Chest No Cont (10.22.23 @ 13:15) >    ACC: [id number] EXAM:  CT ABDOMEN AND PELVIS   ORDERED BY: LLOYD NAVA     ACC: [id number] EXAM:  CT CHEST   ORDERED BY: CHRISTOPHER KENT     PROCEDURE DATE:  10/22/2023          INTERPRETATION:  CLINICAL INFORMATION: Abdominal pain, acute kidney injury    COMPARISON: None.    CONTRAST/COMPLICATIONS:  IV Contrast: NONE  Oral Contrast: NONE  Complications: None reported at time of study completion    PROCEDURE:  CT of the Chest, Abdomen and Pelvis was performed.  Sagittal and coronal reformats were performed.    FINDINGS:  CHEST:  LUNGS AND LARGE AIRWAYS: Patent central airways. No pulmonary nodules or   lung consolidation.  PLEURA: No pleural effusion.  VESSELS: Atherosclerotic changes of the aorta and coronary arteries.  HEART: Heart size is normal. No pericardial effusion. Aortic valvular   calcifications.  MEDIASTINUM AND MAXIMO: No lymphadenopathy.  CHEST WALL AND LOWER NECK: Within normal limits.    ABDOMEN AND PELVIS:  LIVER: 1.8 x 1.1 cm hypodense right hepatic lobe lesion which is   indeterminate.  BILE DUCTS: Normal caliber.  GALLBLADDER: Within normal limits.  SPLEEN: Within normal limits.  PANCREAS: Within normal limits.  ADRENALS: Within normal limits.  KIDNEYS/URETERS: 14 mm hypodense right renal lesion, indeterminate. No   hydronephrosis.    BLADDER: Within normal limits. Rangel catheter balloon in the bladder   lumen.  REPRODUCTIVE ORGANS: Prostate is enlarged.    BOWEL: Dilated ascending and transverse colon; the distal transverse   colon descends into a large left inguinal hernia with the bowel narrowed   at the neck of the hernia. The sigmoid colon and rectum distal to the   hernia are normal in caliber. The small bowel is normal in caliber.   Appendix is normal.  PERITONEUM: No ascites.  VESSELS: Atherosclerotic changes.  RETROPERITONEUM/LYMPH NODES: No lymphadenopathy.  ABDOMINAL WALL: Within normal limits.  BONES: Spinal degenerative changes.    IMPRESSION:  No evidence of pneumonia.  Large left inguinal hernia containing colon. There is dilatation of the   colon proximal to the hernia suggestive of large bowel obstruction.  Indeterminate hepatic and right renal lesions. Nonemergent MRI can be   performed to better characterize.    --- End of Report ---            LYNNE CISNEROS MD; Attending Radiologist  This document has been electronically signed. Oct 22 2023  1:43PM    < end of copied text >  ------------------------------------------------------------------------------------------------------------------------------------------------  ACC: [id number] EXAM:  CT BRAIN STROKE PROTOCOL   ORDERED BY: LLOYD NAVA     PROCEDURE DATE:  10/22/2023          INTERPRETATION:  CLINICAL INDICATIONS:  Stroke Code, slurred speech    COMPARISON: None.    TECHNIQUE: Noncontrast CT of the head. Multiplanar reformations are   submitted. RAPID artificial intelligence was utilized for the preliminary   evaluation of intracranial hemorrhage.    FINDINGS:  There is periventricular and subcortical white matter hypodensity without   mass effect,nonspecific, likely representing mild chronic microvascular   ischemic changes. There is no compelling evidence for an acute   transcortical infarction. There is no evidence of mass, mass effect,   midline shift or extra-axial fluid collection. The lateral ventricles and   cortical sulci are age-appropriate in size and configuration. The orbits,   mastoid air cells and visualized paranasal sinuses are unremarkable. The   calvarium is intact. Consider MRI as clinically warranted.    IMPRESSION:  Mild chronic microvascular changes without evidence of an   acute transcortical infarction or hemorrhage. Findings discussed with Dr. Nava at 11:12 AM.    --- End of Report ---            YONI OLIVER MD; Attending Radiologist  This document has been electronically signed. Oct 22 2023 11:13AM    < end of copied text >        ROS  [  ] UNABLE TO ELICIT               HPI:  Patient is a 79 y/o M, lives alone at home, ambulates w/ a cane. He has PMHx of HTN, DM, BPH. He is a poor historian and collateral Hx was obtained from his daughter, Ms. Rosa Mcneil ([phone]) at bedside. He was trying to get off his bed when he fell down on the floor. He was found after an unspecified number of hours. He was noted to have slurred speech and altered mental status. Of note, he was noted to have been taking Ciprofloxacin for UTI. He was brought to the ED w/ VS /64, HR 72, RR 24, SO2 100% on 3L nasal cannula. NIHSS was 1. CTA Head/Neck (Stroke Protocol) was negative. EKG showed NSR w/ TWI. Labs were pertinent for WBC 19.96, K 6.3, AG 32, BUN/SCr 206/12.60. ABG showed He was given hyperkalemia cocktail (calcium gluconate + insulin/dextrose), 150 mg NaHCO3.  (22 Oct 2023 13:03)          History as above, asked to see this patient who presented from home after being found on the floor , he fell and is confused and unable to give any history . He is in the ICU as he was found to have Septic Shock and is currently on 2 pressors and just had a CVP placed. He presented with AKIN, slurred speech , confusion and cloudy dark urine, he was being treated for a UTI with cipro after going to a urgent care center for dysuria and urgency as per his daughter who is present.          PAST MEDICAL & SURGICAL HISTORY:  Diabetes      History of hypertension      BPH (benign prostatic hypertrophy)      No significant past surgical history          No Known Drug Allergies  shellfish (Swelling (Moderate))  strawberry (Pruritus; Rash; Swelling (Moderate))      Meds:  chlorhexidine 2% Cloths 1 Application(s) Topical <User Schedule>  insulin lispro (ADMELOG) corrective regimen sliding scale   SubCutaneous every 6 hours  norepinephrine Infusion 0.05 MICROgram(s)/kG/Min IV Continuous <Continuous>  piperacillin/tazobactam IVPB.- 3.375 Gram(s) IV Intermittent once  sodium bicarbonate  Infusion 0.134 mEq/kG/Hr IV Continuous <Continuous>  sodium chloride 0.9% Bolus 1000 milliLiter(s) IV Bolus once  sodium chloride 0.9% Bolus. 100 milliLiter(s) IV Bolus every 5 minutes PRN  sodium chloride 0.9% lock flush 10 milliLiter(s) IV Push every 1 hour PRN  vasopressin Infusion 0.04 Unit(s)/Min IV Continuous <Continuous>      SOCIAL HISTORY:  Smoker:  YES / NO        PACK YEARS:                         WHEN QUIT?  ETOH use:  YES / NO               FREQUENCY / QUANTITY:  Ilicit Drug use:  YES / NO  Occupation:  Assisted device use (Cane / Walker):  Live with:    FAMILY HISTORY:  FH: leukemia (Child)    FH: heart disease (Mother)        VITALS:  Vital Signs Last 24 Hrs  T(C): 36.5 (22 Oct 2023 11:53), Max: 36.5 (22 Oct 2023 11:53)  T(F): 97.7 (22 Oct 2023 11:53), Max: 97.7 (22 Oct 2023 11:53)  HR: 99 (22 Oct 2023 15:30) (71 - 102)  BP: 99/43 (22 Oct 2023 15:30) (74/43 - 129/64)  BP(mean): 59 (22 Oct 2023 15:30) (53 - 73)  RR: 16 (22 Oct 2023 15:30) (16 - 25)  SpO2: 100% (22 Oct 2023 15:30) (96% - 100%)    Parameters below as of 22 Oct 2023 14:00  Patient On (Oxygen Delivery Method): room air        LABS/DIAGNOSTIC TESTS:                          11.2   19.96 )-----------( 443      ( 22 Oct 2023 11:25 )             34.0     WBC Count: 19.96 K/uL (10-22 @ 11:25)      10-22    132<L>  |  95<L>  |  216<H>  ----------------------------<  217<H>  6.4<HH>   |  5<LL>  |  11.80<H>    Ca    8.2<L>      22 Oct 2023 14:20    TPro  6.8  /  Alb  3.0<L>  /  TBili  0.4  /  DBili  x   /  AST  31  /  ALT  37  /  AlkPhos  89  10-22      Urine Microscopic-Add On (NC) (10.22.23 @ 12:31)   Bacteria: Many /HPF  Comment - Urine: Moderate Amorphous Urates  Squamous Epithelial Cells: Present  Red Blood Cell - Urine: 20 /HPF  White Blood Cell - Urine: 10 /HPFUrinalysis (10.22.23 @ 12:31)   pH Urine: 5.0  Blood, Urine: Small  Glucose Qualitative, Urine: Negative mg/dL  Color: Yellow  Urine Appearance: Clear  Bilirubin: Negative  Ketone - Urine: Negative mg/dL  Specific Gravity: 1.012  Protein, Urine: 30 mg/dL  Urobilinogen: 0.2 mg/dL  Nitrite: Negative  Leukocyte Esterase Concentration: Trace      LIVER FUNCTIONS - ( 22 Oct 2023 11:25 )  Alb: 3.0 g/dL / Pro: 6.8 g/dL / ALK PHOS: 89 U/L / ALT: 37 U/L DA / AST: 31 U/L / GGT: x             PT/INR - ( 22 Oct 2023 11:25 )   PT: 14.5 sec;   INR: 1.28 ratio         PTT - ( 22 Oct 2023 14:20 )  PTT:36.4 sec    LACTATE:Lactate, Blood: 7.5 mmol/L (10-22 @ 14:20)  Lactate, Blood: 3.8 mmol/L (10-22 @ 12:21)      ABG - ABG - ( 22 Oct 2023 12:26 )  pH, Arterial: <7.00 pH, Blood: x     /  pCO2: 7     /  pO2: 168   / HCO3: Incalculable / Base Excess: Incalculable /  SaO2: 100                 CULTURES:       RADIOLOGY:< from: CT Chest No Cont (10.22.23 @ 13:15) >    ACC: [id number] EXAM:  CT ABDOMEN AND PELVIS   ORDERED BY: LLOYD NAVA     ACC: [id number] EXAM:  CT CHEST   ORDERED BY: CHRISTOPHER KENT     PROCEDURE DATE:  10/22/2023          INTERPRETATION:  CLINICAL INFORMATION: Abdominal pain, acute kidney injury    COMPARISON: None.    CONTRAST/COMPLICATIONS:  IV Contrast: NONE  Oral Contrast: NONE  Complications: None reported at time of study completion    PROCEDURE:  CT of the Chest, Abdomen and Pelvis was performed.  Sagittal and coronal reformats were performed.    FINDINGS:  CHEST:  LUNGS AND LARGE AIRWAYS: Patent central airways. No pulmonary nodules or   lung consolidation.  PLEURA: No pleural effusion.  VESSELS: Atherosclerotic changes of the aorta and coronary arteries.  HEART: Heart size is normal. No pericardial effusion. Aortic valvular   calcifications.  MEDIASTINUM AND MAXIMO: No lymphadenopathy.  CHEST WALL AND LOWER NECK: Within normal limits.    ABDOMEN AND PELVIS:  LIVER: 1.8 x 1.1 cm hypodense right hepatic lobe lesion which is   indeterminate.  BILE DUCTS: Normal caliber.  GALLBLADDER: Within normal limits.  SPLEEN: Within normal limits.  PANCREAS: Within normal limits.  ADRENALS: Within normal limits.  KIDNEYS/URETERS: 14 mm hypodense right renal lesion, indeterminate. No   hydronephrosis.    BLADDER: Within normal limits. Rangel catheter balloon in the bladder   lumen.  REPRODUCTIVE ORGANS: Prostate is enlarged.    BOWEL: Dilated ascending and transverse colon; the distal transverse   colon descends into a large left inguinal hernia with the bowel narrowed   at the neck of the hernia. The sigmoid colon and rectum distal to the   hernia are normal in caliber. The small bowel is normal in caliber.   Appendix is normal.  PERITONEUM: No ascites.  VESSELS: Atherosclerotic changes.  RETROPERITONEUM/LYMPH NODES: No lymphadenopathy.  ABDOMINAL WALL: Within normal limits.  BONES: Spinal degenerative changes.    IMPRESSION:  No evidence of pneumonia.  Large left inguinal hernia containing colon. There is dilatation of the   colon proximal to the hernia suggestive of large bowel obstruction.  Indeterminate hepatic and right renal lesions. Nonemergent MRI can be   performed to better characterize.    --- End of Report ---            LYNNE CISNEROS MD; Attending Radiologist  This document has been electronically signed. Oct 22 2023  1:43PM    < end of copied text >  ------------------------------------------------------------------------------------------------------------------------------------------------  ACC: [id number] EXAM:  CT BRAIN STROKE PROTOCOL   ORDERED BY: LLOYD NAVA     PROCEDURE DATE:  10/22/2023          INTERPRETATION:  CLINICAL INDICATIONS:  Stroke Code, slurred speech    COMPARISON: None.    TECHNIQUE: Noncontrast CT of the head. Multiplanar reformations are   submitted. RAPID artificial intelligence was utilized for the preliminary   evaluation of intracranial hemorrhage.    FINDINGS:  There is periventricular and subcortical white matter hypodensity without   mass effect,nonspecific, likely representing mild chronic microvascular   ischemic changes. There is no compelling evidence for an acute   transcortical infarction. There is no evidence of mass, mass effect,   midline shift or extra-axial fluid collection. The lateral ventricles and   cortical sulci are age-appropriate in size and configuration. The orbits,   mastoid air cells and visualized paranasal sinuses are unremarkable. The   calvarium is intact. Consider MRI as clinically warranted.    IMPRESSION:  Mild chronic microvascular changes without evidence of an   acute transcortical infarction or hemorrhage. Findings discussed with Dr. Nava at 11:12 AM.    --- End of Report ---            YONI OLIVER MD; Attending Radiologist  This document has been electronically signed. Oct 22 2023 11:13AM    < end of copied text >        ROS  [  ] UNABLE TO ELICIT               HPI:  Patient is a 77 y/o M, lives alone at home, ambulates w/ a cane. He has PMHx of HTN, DM, BPH. He is a poor historian and collateral Hx was obtained from his daughter, Ms. Rosa Mcneil ([phone]) at bedside. He was trying to get off his bed when he fell down on the floor. He was found after an unspecified number of hours. He was noted to have slurred speech and altered mental status. Of note, he was noted to have been taking Ciprofloxacin for UTI. He was brought to the ED w/ VS /64, HR 72, RR 24, SO2 100% on 3L nasal cannula. NIHSS was 1. CTA Head/Neck (Stroke Protocol) was negative. EKG showed NSR w/ TWI. Labs were pertinent for WBC 19.96, K 6.3, AG 32, BUN/SCr 206/12.60. ABG showed He was given hyperkalemia cocktail (calcium gluconate + insulin/dextrose), 150 mg NaHCO3.  (22 Oct 2023 13:03)          History as above, asked to see this patient who presented from home after being found on the floor , he fell and is confused and unable to give any history . He is in the ICU as he was found to have Septic Shock and is currently on 2 pressors and just had a CVP placed. He presented with AKIN, slurred speech , confusion and cloudy dark urine, he was being treated for a UTI with cipro after going to a urgent care center for dysuria and urgency as per his daughter who is present. He is currently on Zosyn. He was also found to have ileus vs obstruction of bowel with a disteded abdomen requiring a NGT and decompression of a large amount of bilious fluid.          PAST MEDICAL & SURGICAL HISTORY:  Diabetes      History of hypertension      BPH (benign prostatic hypertrophy)      No significant past surgical history          No Known Drug Allergies  shellfish (Swelling (Moderate))  strawberry (Pruritus; Rash; Swelling (Moderate))      Meds:  chlorhexidine 2% Cloths 1 Application(s) Topical <User Schedule>  insulin lispro (ADMELOG) corrective regimen sliding scale   SubCutaneous every 6 hours  norepinephrine Infusion 0.05 MICROgram(s)/kG/Min IV Continuous <Continuous>  piperacillin/tazobactam IVPB.- 3.375 Gram(s) IV Intermittent once  sodium bicarbonate  Infusion 0.134 mEq/kG/Hr IV Continuous <Continuous>  sodium chloride 0.9% Bolus 1000 milliLiter(s) IV Bolus once  sodium chloride 0.9% Bolus. 100 milliLiter(s) IV Bolus every 5 minutes PRN  sodium chloride 0.9% lock flush 10 milliLiter(s) IV Push every 1 hour PRN  vasopressin Infusion 0.04 Unit(s)/Min IV Continuous <Continuous>      SOCIAL HISTORY:  Smoker:  no  ETOH use:  no      FAMILY HISTORY:  FH: leukemia (Child)    FH: heart disease (Mother)        VITALS:  Vital Signs Last 24 Hrs  T(C): 36.5 (22 Oct 2023 11:53), Max: 36.5 (22 Oct 2023 11:53)  T(F): 97.7 (22 Oct 2023 11:53), Max: 97.7 (22 Oct 2023 11:53)  HR: 99 (22 Oct 2023 15:30) (71 - 102)  BP: 99/43 (22 Oct 2023 15:30) (74/43 - 129/64)  BP(mean): 59 (22 Oct 2023 15:30) (53 - 73)  RR: 16 (22 Oct 2023 15:30) (16 - 25)  SpO2: 100% (22 Oct 2023 15:30) (96% - 100%)    Parameters below as of 22 Oct 2023 14:00  Patient On (Oxygen Delivery Method): room air        LABS/DIAGNOSTIC TESTS:                          11.2   19.96 )-----------( 443      ( 22 Oct 2023 11:25 )             34.0     WBC Count: 19.96 K/uL (10-22 @ 11:25)      10-22    132<L>  |  95<L>  |  216<H>  ----------------------------<  217<H>  6.4<HH>   |  5<LL>  |  11.80<H>    Ca    8.2<L>      22 Oct 2023 14:20    TPro  6.8  /  Alb  3.0<L>  /  TBili  0.4  /  DBili  x   /  AST  31  /  ALT  37  /  AlkPhos  89  10-22      Urine Microscopic-Add On (NC) (10.22.23 @ 12:31)   Bacteria: Many /HPF  Comment - Urine: Moderate Amorphous Urates  Squamous Epithelial Cells: Present  Red Blood Cell - Urine: 20 /HPF  White Blood Cell - Urine: 10 /HPFUrinalysis (10.22.23 @ 12:31)   pH Urine: 5.0  Blood, Urine: Small  Glucose Qualitative, Urine: Negative mg/dL  Color: Yellow  Urine Appearance: Clear  Bilirubin: Negative  Ketone - Urine: Negative mg/dL  Specific Gravity: 1.012  Protein, Urine: 30 mg/dL  Urobilinogen: 0.2 mg/dL  Nitrite: Negative  Leukocyte Esterase Concentration: Trace      LIVER FUNCTIONS - ( 22 Oct 2023 11:25 )  Alb: 3.0 g/dL / Pro: 6.8 g/dL / ALK PHOS: 89 U/L / ALT: 37 U/L DA / AST: 31 U/L / GGT: x             PT/INR - ( 22 Oct 2023 11:25 )   PT: 14.5 sec;   INR: 1.28 ratio         PTT - ( 22 Oct 2023 14:20 )  PTT:36.4 sec    LACTATE:Lactate, Blood: 7.5 mmol/L (10-22 @ 14:20)  Lactate, Blood: 3.8 mmol/L (10-22 @ 12:21)      ABG - ABG - ( 22 Oct 2023 12:26 )  pH, Arterial: <7.00 pH, Blood: x     /  pCO2: 7     /  pO2: 168   / HCO3: Incalculable / Base Excess: Incalculable /  SaO2: 100                 CULTURES:       RADIOLOGY:< from: CT Chest No Cont (10.22.23 @ 13:15) >    ACC: [id number] EXAM:  CT ABDOMEN AND PELVIS   ORDERED BY: LLOYD NAVA     ACC: [id number] EXAM:  CT CHEST   ORDERED BY: CHRISTOPHER KENT     PROCEDURE DATE:  10/22/2023          INTERPRETATION:  CLINICAL INFORMATION: Abdominal pain, acute kidney injury    COMPARISON: None.    CONTRAST/COMPLICATIONS:  IV Contrast: NONE  Oral Contrast: NONE  Complications: None reported at time of study completion    PROCEDURE:  CT of the Chest, Abdomen and Pelvis was performed.  Sagittal and coronal reformats were performed.    FINDINGS:  CHEST:  LUNGS AND LARGE AIRWAYS: Patent central airways. No pulmonary nodules or   lung consolidation.  PLEURA: No pleural effusion.  VESSELS: Atherosclerotic changes of the aorta and coronary arteries.  HEART: Heart size is normal. No pericardial effusion. Aortic valvular   calcifications.  MEDIASTINUM AND MAXIMO: No lymphadenopathy.  CHEST WALL AND LOWER NECK: Within normal limits.    ABDOMEN AND PELVIS:  LIVER: 1.8 x 1.1 cm hypodense right hepatic lobe lesion which is   indeterminate.  BILE DUCTS: Normal caliber.  GALLBLADDER: Within normal limits.  SPLEEN: Within normal limits.  PANCREAS: Within normal limits.  ADRENALS: Within normal limits.  KIDNEYS/URETERS: 14 mm hypodense right renal lesion, indeterminate. No   hydronephrosis.    BLADDER: Within normal limits. Rangel catheter balloon in the bladder   lumen.  REPRODUCTIVE ORGANS: Prostate is enlarged.    BOWEL: Dilated ascending and transverse colon; the distal transverse   colon descends into a large left inguinal hernia with the bowel narrowed   at the neck of the hernia. The sigmoid colon and rectum distal to the   hernia are normal in caliber. The small bowel is normal in caliber.   Appendix is normal.  PERITONEUM: No ascites.  VESSELS: Atherosclerotic changes.  RETROPERITONEUM/LYMPH NODES: No lymphadenopathy.  ABDOMINAL WALL: Within normal limits.  BONES: Spinal degenerative changes.    IMPRESSION:  No evidence of pneumonia.  Large left inguinal hernia containing colon. There is dilatation of the   colon proximal to the hernia suggestive of large bowel obstruction.  Indeterminate hepatic and right renal lesions. Nonemergent MRI can be   performed to better characterize.    --- End of Report ---            LYNNE CISNEROS MD; Attending Radiologist  This document has been electronically signed. Oct 22 2023  1:43PM    < end of copied text >  ------------------------------------------------------------------------------------------------------------------------------------------------  ACC: [id number] EXAM:  CT BRAIN STROKE PROTOCOL   ORDERED BY: LLOYD NAVA     PROCEDURE DATE:  10/22/2023          INTERPRETATION:  CLINICAL INDICATIONS:  Stroke Code, slurred speech    COMPARISON: None.    TECHNIQUE: Noncontrast CT of the head. Multiplanar reformations are   submitted. RAPID artificial intelligence was utilized for the preliminary   evaluation of intracranial hemorrhage.    FINDINGS:  There is periventricular and subcortical white matter hypodensity without   mass effect,nonspecific, likely representing mild chronic microvascular   ischemic changes. There is no compelling evidence for an acute   transcortical infarction. There is no evidence of mass, mass effect,   midline shift or extra-axial fluid collection. The lateral ventricles and   cortical sulci are age-appropriate in size and configuration. The orbits,   mastoid air cells and visualized paranasal sinuses are unremarkable. The   calvarium is intact. Consider MRI as clinically warranted.    IMPRESSION:  Mild chronic microvascular changes without evidence of an   acute transcortical infarction or hemorrhage. Findings discussed with Dr. Nava at 11:12 AM.    --- End of Report ---            YONI OLIVER MD; Attending Radiologist  This document has been electronically signed. Oct 22 2023 11:13AM    < end of copied text >        ROS  [ x ] UNABLE TO ELICIT

## 2023-10-22 NOTE — ED ADULT TRIAGE NOTE - ARRIVAL INFO ADDITIONAL COMMENTS
iv 10% by EMS fluids on and ORAL glucose given at home , initial accu at home 60 ,after Glucagon - accu 88

## 2023-10-22 NOTE — PROCEDURE NOTE - ADDITIONAL PROCEDURE DETAILS
18 gauge 6 cm extended dwell peripheral line placed on Left upper extremity, can be used up to 29 days from day of placement(10/22/23)

## 2023-10-22 NOTE — CONSULT NOTE ADULT - ASSESSMENT
Septic Shock   UTI  Leukocytosis   R/O aspiration Pneumonia  new onset A. Fib  AKIN  ? ileus      Plan - Cont Zosyn 3.375gms iv q12hrs for now  await culture results.

## 2023-10-22 NOTE — ED PROVIDER NOTE - CLINICAL SUMMARY MEDICAL DECISION MAKING FREE TEXT BOX
78-year-old male history of diabetes, hypertension, BPH, currently on antibiotics for UTI presents to ED with slurred speech noticed yesterday.  Daughter at bedside to give collateral information.  Stroke code called, NIHSS of 1 for slurred speech.

## 2023-10-22 NOTE — PROGRESS NOTE ADULT - SUBJECTIVE AND OBJECTIVE BOX
GENERAL SURGERY CONSULT  79 y/o male with past medical history of DM, HTN, BPH BIBEMS with AMS after fall at home found to be uremic/septic brought to ICU for resuscitation. General surgery consulted for findings of large left inguinal hernia containing colon concerning for large bowel obstruction. Pt seen and examined at bedside. Pt is AxOx3 at baseline, today is AxOx1-2, unreliable historian. Collateral information obtained from daughter Rosa, at bedside. States pt was found this morning on the ground, unable to get up, after unknown amount of hours. States pt lives alone, ambulates with cane. Daughter admits to pt c/o abdominal pain with associated nausea/retching starting yesterday. Pt is AxOx2 during interview, admits to last bowel movement 5 days ago. Not passing flatus. Pt denies vomiting. Daughter states pt has known L inguinal hernia, has been putting off elective repair. States chronically bulging/visible through pants. Denies past abdominal surgeries.     PAST MEDICAL & SURGICAL HISTORY:  Diabetes  History of hypertension  BPH (benign prostatic hypertrophy)  No significant past surgical history    MEDICATIONS  (STANDING):  calcium gluconate IVPB 2 Gram(s) IV Intermittent once  chlorhexidine 2% Cloths 1 Application(s) Topical <User Schedule>  dextrose 50% Injectable 50 milliLiter(s) IV Push once  insulin regular  human recombinant 5 Unit(s) IV Push once  norepinephrine Infusion 0.05 MICROgram(s)/kG/Min (7.62 mL/Hr) IV Continuous <Continuous>  piperacillin/tazobactam IVPB. 3.375 Gram(s) IV Intermittent once  piperacillin/tazobactam IVPB.- 3.375 Gram(s) IV Intermittent once  sodium bicarbonate  Infusion 0.134 mEq/kG/Hr (75 mL/Hr) IV Continuous <Continuous>  sodium bicarbonate  Injectable 150 milliEquivalent(s) IV Push once    MEDICATIONS  (PRN):      Allergies    No Known Drug Allergies  shellfish (Swelling (Moderate))  strawberry (Pruritus; Rash; Swelling (Moderate))    Intolerances        Vital Signs Last 24 Hrs  T(C): 36.5 (22 Oct 2023 11:53), Max: 36.5 (22 Oct 2023 11:53)  T(F): 97.7 (22 Oct 2023 11:53), Max: 97.7 (22 Oct 2023 11:53)  HR: 88 (22 Oct 2023 13:59) (71 - 88)  BP: 92/44 (22 Oct 2023 13:59) (92/44 - 129/64)  BP(mean): 59 (22 Oct 2023 13:59) (59 - 59)  RR: 18 (22 Oct 2023 13:59) (18 - 25)  SpO2: 100% (22 Oct 2023 13:59) (100% - 100%)    Parameters below as of 22 Oct 2023 13:59  Patient On (Oxygen Delivery Method): room air        Physical:  Gen: A&Ox2. Moderate distress.  Resp: Unlabored breathing. Equal chest rise bilaterally.  Abdomen: Softly distended, +tympanic percussion. Nontender to palpation diffusely. No voluntary guarding, no rebound tenderness, no peritonitic signs.   Pelvis: significant swelling on L side. L inguinal/scrotal hernia noted. Soft to touch. Nontender to palpation/manipulation. No overlying skin changes. Manual reduction attempted at bedside.         I&O's Detail    22 Oct 2023 07:01  -  22 Oct 2023 14:33  --------------------------------------------------------  IN:    Sodium Chloride 0.9% Bolus: 1000 mL  Total IN: 1000 mL    OUT:    Indwelling Catheter - Urethral (mL): 2150 mL  Total OUT: 2150 mL    Total NET: -1150 mL          LABS:                        11.2   19.96 )-----------( 443      ( 22 Oct 2023 11:25 )             34.0              10    131<L>  |  96  |  206<H>  ----------------------------<  110<H>  6.3<HH>   |  3<LL>  |  12.60<H>    Ca    8.2<L>      22 Oct 2023 11:25    TPro  6.8  /  Alb  3.0<L>  /  TBili  0.4  /  DBili  x   /  AST  31  /  ALT  37  /  AlkPhos  89  10            PT/INR - ( 22 Oct 2023 11:25 )   PT: 14.5 sec;   INR: 1.28 ratio         PTT - ( 22 Oct 2023 11:25 )  PTT:37.5 sec  Urinalysis Basic - ( 22 Oct 2023 12:31 )    Color: Yellow / Appearance: Clear / S.012 / pH: x  Gluc: x / Ketone: Negative mg/dL  / Bili: Negative / Urobili: 0.2 mg/dL   Blood: x / Protein: 30 mg/dL / Nitrite: Negative   Leuk Esterase: Trace / RBC: 20 /HPF / WBC 10 /HPF   Sq Epi: x / Non Sq Epi: x / Bacteria: Many /HPF        RADIOLOGY & ADDITIONAL STUDIES:  < from: CT Abdomen and Pelvis No Cont (10.22.23 @ 13:15) >  ACC: [id number] EXAM:  CT ABDOMEN AND PELVIS   ORDERED BY: LLOYD NAVA     ACC: [id number] EXAM:  CT CHEST   ORDERED BY: CHRISTOPHER KENT     PROCEDURE DATE:  10/22/2023          INTERPRETATION:  CLINICAL INFORMATION: Abdominal pain, acute kidney injury    COMPARISON: None.    CONTRAST/COMPLICATIONS:  IV Contrast: NONE  Oral Contrast: NONE  Complications: None reported at time of study completion    PROCEDURE:  CT of the Chest, Abdomen and Pelvis was performed.  Sagittal and coronal reformats were performed.    FINDINGS:  CHEST:  LUNGS AND LARGE AIRWAYS: Patent central airways. No pulmonary nodules or   lung consolidation.  PLEURA: No pleural effusion.  VESSELS: Atherosclerotic changes of the aorta and coronary arteries.  HEART: Heart size is normal. No pericardial effusion. Aortic valvular   calcifications.  MEDIASTINUM AND MAXIMO: No lymphadenopathy.  CHEST WALL AND LOWER NECK: Within normal limits.    ABDOMEN AND PELVIS:  LIVER: 1.8 x 1.1 cm hypodense right hepatic lobe lesion which is   indeterminate.  BILE DUCTS: Normal caliber.  GALLBLADDER: Within normal limits.  SPLEEN: Within normal limits.  PANCREAS: Within normal limits.  ADRENALS: Within normal limits.  KIDNEYS/URETERS: 14 mm hypodense right renal lesion, indeterminate. No   hydronephrosis.    BLADDER: Within normal limits. Rangel catheter balloon in the bladder   lumen.  REPRODUCTIVE ORGANS: Prostate is enlarged.    BOWEL: Dilated ascending and transverse colon; the distal transverse   colon descends into a large left inguinal hernia with the bowel narrowed   at the neck of the hernia. The sigmoid colon and rectum distal to the   hernia are normal in caliber. The small bowel is normal in caliber.   Appendix is normal.  PERITONEUM: No ascites.  VESSELS: Atherosclerotic changes.  RETROPERITONEUM/LYMPH NODES: No lymphadenopathy.  ABDOMINAL WALL: Within normal limits.  BONES: Spinal degenerative changes.    IMPRESSION:  No evidence of pneumonia.  Large left inguinal hernia containing colon. There is dilatation of the   colon proximal to the hernia suggestive of large bowel obstruction.  Indeterminate hepatic and right renal lesions. Nonemergent MRI can be   performed to better characterize.    --- End of Report ---            LYNNE CISNEROS MD; Attending Radiologist  This document has been electronically signed. Oct 22 2023  1:43PM    < end of copied text >

## 2023-10-22 NOTE — CONSULT NOTE ADULT - GASTROINTESTINAL
soft/nontender/no guarding/no rigidity/no organomegaly/no masses palpable/distended/bowel sounds hypoactive

## 2023-10-22 NOTE — ED ADULT TRIAGE NOTE - CADM TRG TX PRIOR TO ARRIVAL
glucagon 1gr IM/medications/oxygen/saline lock/see ambulance record glucagon 1gr IM/EKG/fluids/IV/medications/oxygen/saline lock/see ambulance record

## 2023-10-22 NOTE — PROCEDURE NOTE - NSINDICATIONS_GEN_A_CORE
critical illness/venous access/volume resuscitation
critical illness/dialysis/CRRT
antibiotic therapy/fluid administration
arterial puncture to obtain ABG's/critical patient

## 2023-10-22 NOTE — ED PROVIDER NOTE - NSICDXPASTMEDICALHX_GEN_ALL_CORE_FT
PAST MEDICAL HISTORY:  BPH (benign prostatic hypertrophy)     Diabetes     History of hypertension

## 2023-10-22 NOTE — ED PROVIDER NOTE - OBJECTIVE STATEMENT
78-year-old male history of diabetes, hypertension, BPH presents to ED as a notification for slurred speech.  Patient's daughter at bedside to give collateral information.  As per her she spoke to her father last night and his speech appeared slurred.  This morning she spoke to him and it still appeared slurred so she went to go check on him.  Upon arrival to his place he was found on the floor by his bed.  Unsure exactly why patient was on the floor.  Patient says he did not fall or faint.  Fingerstick in the field was in the 60s and glucagon was given by EMS.  Upon arrival to the ED strength was intact however patient still with slurred speech stroke code called.  Of note, the patient currently on cipro for a UTI.

## 2023-10-22 NOTE — CONSULT NOTE ADULT - ASSESSMENT
77 y/o male a/w AMS/septic shock found to have large L inguinal hernia containing colon    As per ICU team, pt not surgical candidate in acute state.    Plan   - resuscitation as per ICU team   - optimization for tentative OR   - serial abd exams  - continue NGT to LCS  - manual reduction by surgical team once pt hemodynamically stable   - remainder of care as per ICU team   - d/w attending    Note recommendations are preliminary until co signed by attending

## 2023-10-22 NOTE — ED ADULT NURSE NOTE - SKIN CAPILLARY REFILL
Continue Zosyn and vancomycin  Continue wound care recommendations  Venous duplex with no significant acute finding  CT of right lower extremity showed small abscess collection  Surgery evaluated and suggested to continue antibiotics  2 seconds or less

## 2023-10-22 NOTE — H&P ADULT - ATTENDING COMMENTS
77 y/o male with DM type 2, HTN and BPH presenting after being found on the floor by daughter. States patient had slurred speech last night, had been endorsing abdominal discomfort. Was getting treated for UTI last week. Patient is awake but confused, denies any pain or shortness of breath. Appears severely dehydrated.    Labs significant for acute renal failure with severe lactic acidosis and anion gap with hyperkalemia. CT abdomen/pelvis showing possible large bowel obstruction with incarcerated hernia.     Assessment:  1. Acute renal failure likely ATN  2. Lactic acidosis with HAGMA   3. Large bowel obstruction possibly incarcerated hernia   4. Acute encephalopathy   5. DM type 2   6. Urinary retention     Plan:  - POCUS showing collapsable IVC with hyperdynamic heart  - IV fluid resuscitation  - Medical management for hyperkalemia   - Trend BMP and lactate   - Rangel to monitor urine output  - Sodium bicarb supplementation   - Check urine studies   - Nephrology consult for urgent HD   - Surgery consult  - NGT for gastric decompression  - Empiric antibiotics  - Obtain cultures  - Follow up results  - Close hemodynamic monitoring  - May need vasopressor support   - Admit to ICU for closer management   - Discussed with daughter at bedside understands critical condition 79 y/o male with DM type 2, HTN and BPH presenting after being found on the floor by daughter. States patient had slurred speech last night, had been endorsing abdominal discomfort. Was getting treated for UTI last week. Patient is awake but confused, denies any pain or shortness of breath. Appears severely dehydrated.    Labs significant for acute renal failure with severe lactic acidosis and anion gap with hyperkalemia. CT abdomen/pelvis showing possible large bowel obstruction with incarcerated hernia.     Assessment:  1. Acute renal failure likely ATN  2. Lactic acidosis with HAGMA   3. Large bowel obstruction possibly incarcerated hernia   4. Acute encephalopathy   5. DM type 2   6. Urinary retention     Plan:  - POCUS showing collapsable IVC with hyperdynamic heart  - IV fluid resuscitation  - Medical management for hyperkalemia   - Trend BMP and lactate   - Rangel to monitor urine output  - Sodium bicarb supplementation   - Check urine studies   - Nephrology consult for urgent HD   - Surgery consult  - NGT for gastric decompression  - NPO for now  - Empiric antibiotics  - Obtain cultures  - Follow up results  - Close hemodynamic monitoring  - May need vasopressor support   - Glucose monitoring while NPO, sliding scale insulin coverage for hyperglycemia  - Admit to ICU for closer management   - Discussed with daughter at bedside understands critical condition 79 y/o male with DM type 2, HTN and BPH presenting after being found on the floor by daughter. States patient had slurred speech last night, had been endorsing abdominal discomfort. Was getting treated for UTI last week. Patient is awake but confused, denies any pain or shortness of breath. Appears severely dehydrated.    Labs significant for acute renal failure with severe lactic acidosis and anion gap with hyperkalemia. CT abdomen/pelvis showing possible large bowel obstruction with incarcerated hernia.     Assessment:  1. Acute renal failure likely ATN  2. Lactic acidosis with HAGMA   3. Large bowel obstruction possibly incarcerated hernia   4. Acute encephalopathy   5. DM type 2   6. Urinary retention   7. Hyperkalemia    Plan:  - POCUS showing collapsable IVC with hyperdynamic heart  - IV fluid resuscitation  - Medical management for hyperkalemia   - Trend BMP and lactate   - Rangel to monitor urine output  - Sodium bicarb supplementation   - Check urine studies   - Nephrology consult for urgent HD   - Surgery consult  - NGT for gastric decompression  - NPO for now  - Empiric antibiotics  - Obtain cultures  - Follow up results  - Close hemodynamic monitoring  - May need vasopressor support   - Glucose monitoring while NPO, sliding scale insulin coverage for hyperglycemia  - Admit to ICU for closer management   - Discussed with daughter at bedside understands critical condition

## 2023-10-23 LAB
ALBUMIN SERPL ELPH-MCNC: 2.5 G/DL — LOW (ref 3.5–5)
ALBUMIN SERPL ELPH-MCNC: 2.5 G/DL — LOW (ref 3.5–5)
ALP SERPL-CCNC: 81 U/L — SIGNIFICANT CHANGE UP (ref 40–120)
ALP SERPL-CCNC: 81 U/L — SIGNIFICANT CHANGE UP (ref 40–120)
ALT FLD-CCNC: 47 U/L DA — SIGNIFICANT CHANGE UP (ref 10–60)
ALT FLD-CCNC: 47 U/L DA — SIGNIFICANT CHANGE UP (ref 10–60)
ANION GAP SERPL CALC-SCNC: 18 MMOL/L — HIGH (ref 5–17)
ANION GAP SERPL CALC-SCNC: 18 MMOL/L — HIGH (ref 5–17)
ANION GAP SERPL CALC-SCNC: 19 MMOL/L — HIGH (ref 5–17)
ANION GAP SERPL CALC-SCNC: 19 MMOL/L — HIGH (ref 5–17)
APTT BLD: 30.7 SEC — SIGNIFICANT CHANGE UP (ref 24.5–35.6)
APTT BLD: 30.7 SEC — SIGNIFICANT CHANGE UP (ref 24.5–35.6)
APTT BLD: 31.3 SEC — SIGNIFICANT CHANGE UP (ref 24.5–35.6)
APTT BLD: 31.3 SEC — SIGNIFICANT CHANGE UP (ref 24.5–35.6)
APTT BLD: >200 SEC — CRITICAL HIGH (ref 24.5–35.6)
APTT BLD: >200 SEC — CRITICAL HIGH (ref 24.5–35.6)
AST SERPL-CCNC: 44 U/L — HIGH (ref 10–40)
AST SERPL-CCNC: 44 U/L — HIGH (ref 10–40)
BASE EXCESS BLDA CALC-SCNC: 5.2 MMOL/L — HIGH (ref -2–3)
BASE EXCESS BLDA CALC-SCNC: 5.2 MMOL/L — HIGH (ref -2–3)
BILIRUB SERPL-MCNC: 0.5 MG/DL — SIGNIFICANT CHANGE UP (ref 0.2–1.2)
BILIRUB SERPL-MCNC: 0.5 MG/DL — SIGNIFICANT CHANGE UP (ref 0.2–1.2)
BUN SERPL-MCNC: 106 MG/DL — HIGH (ref 7–18)
BUN SERPL-MCNC: 106 MG/DL — HIGH (ref 7–18)
BUN SERPL-MCNC: 120 MG/DL — HIGH (ref 7–18)
BUN SERPL-MCNC: 120 MG/DL — HIGH (ref 7–18)
C3 SERPL-MCNC: 93 MG/DL — SIGNIFICANT CHANGE UP (ref 81–157)
C3 SERPL-MCNC: 93 MG/DL — SIGNIFICANT CHANGE UP (ref 81–157)
CALCIUM SERPL-MCNC: 7.6 MG/DL — LOW (ref 8.4–10.5)
CALCIUM SERPL-MCNC: 7.6 MG/DL — LOW (ref 8.4–10.5)
CALCIUM SERPL-MCNC: 7.8 MG/DL — LOW (ref 8.4–10.5)
CALCIUM SERPL-MCNC: 7.8 MG/DL — LOW (ref 8.4–10.5)
CHLORIDE SERPL-SCNC: 106 MMOL/L — SIGNIFICANT CHANGE UP (ref 96–108)
CHLORIDE SERPL-SCNC: 106 MMOL/L — SIGNIFICANT CHANGE UP (ref 96–108)
CHLORIDE SERPL-SCNC: 110 MMOL/L — HIGH (ref 96–108)
CHLORIDE SERPL-SCNC: 110 MMOL/L — HIGH (ref 96–108)
CO2 SERPL-SCNC: 22 MMOL/L — SIGNIFICANT CHANGE UP (ref 22–31)
CO2 SERPL-SCNC: 22 MMOL/L — SIGNIFICANT CHANGE UP (ref 22–31)
CO2 SERPL-SCNC: 26 MMOL/L — SIGNIFICANT CHANGE UP (ref 22–31)
CO2 SERPL-SCNC: 26 MMOL/L — SIGNIFICANT CHANGE UP (ref 22–31)
CREAT SERPL-MCNC: 4.35 MG/DL — HIGH (ref 0.5–1.3)
CREAT SERPL-MCNC: 4.35 MG/DL — HIGH (ref 0.5–1.3)
CREAT SERPL-MCNC: 5.97 MG/DL — HIGH (ref 0.5–1.3)
CREAT SERPL-MCNC: 5.97 MG/DL — HIGH (ref 0.5–1.3)
DRVVT RATIO: 1.03 RATIO — SIGNIFICANT CHANGE UP (ref 0–1.21)
DRVVT RATIO: 1.03 RATIO — SIGNIFICANT CHANGE UP (ref 0–1.21)
DRVVT SCREEN TO CONFIRM RATIO: SIGNIFICANT CHANGE UP
DRVVT SCREEN TO CONFIRM RATIO: SIGNIFICANT CHANGE UP
EGFR: 13 ML/MIN/1.73M2 — LOW
EGFR: 13 ML/MIN/1.73M2 — LOW
EGFR: 9 ML/MIN/1.73M2 — LOW
EGFR: 9 ML/MIN/1.73M2 — LOW
GLUCOSE SERPL-MCNC: 251 MG/DL — HIGH (ref 70–99)
GLUCOSE SERPL-MCNC: 251 MG/DL — HIGH (ref 70–99)
GLUCOSE SERPL-MCNC: 269 MG/DL — HIGH (ref 70–99)
GLUCOSE SERPL-MCNC: 269 MG/DL — HIGH (ref 70–99)
HCO3 BLDA-SCNC: 27 MMOL/L — SIGNIFICANT CHANGE UP (ref 21–28)
HCO3 BLDA-SCNC: 27 MMOL/L — SIGNIFICANT CHANGE UP (ref 21–28)
HCT VFR BLD CALC: 26.9 % — LOW (ref 39–50)
HCT VFR BLD CALC: 26.9 % — LOW (ref 39–50)
HCT VFR BLD CALC: 27.7 % — LOW (ref 39–50)
HCT VFR BLD CALC: 27.7 % — LOW (ref 39–50)
HGB BLD-MCNC: 9.6 G/DL — LOW (ref 13–17)
HGB BLD-MCNC: 9.6 G/DL — LOW (ref 13–17)
HGB BLD-MCNC: 9.7 G/DL — LOW (ref 13–17)
HGB BLD-MCNC: 9.7 G/DL — LOW (ref 13–17)
INR BLD: 1.35 RATIO — HIGH (ref 0.85–1.18)
INR BLD: 1.35 RATIO — HIGH (ref 0.85–1.18)
LACTATE SERPL-SCNC: 1.5 MMOL/L — SIGNIFICANT CHANGE UP (ref 0.7–2)
LACTATE SERPL-SCNC: 1.5 MMOL/L — SIGNIFICANT CHANGE UP (ref 0.7–2)
MAGNESIUM SERPL-MCNC: 2 MG/DL — SIGNIFICANT CHANGE UP (ref 1.6–2.6)
MAGNESIUM SERPL-MCNC: 2 MG/DL — SIGNIFICANT CHANGE UP (ref 1.6–2.6)
MAGNESIUM SERPL-MCNC: 2.4 MG/DL — SIGNIFICANT CHANGE UP (ref 1.6–2.6)
MAGNESIUM SERPL-MCNC: 2.4 MG/DL — SIGNIFICANT CHANGE UP (ref 1.6–2.6)
MCHC RBC-ENTMCNC: 28.7 PG — SIGNIFICANT CHANGE UP (ref 27–34)
MCHC RBC-ENTMCNC: 28.7 PG — SIGNIFICANT CHANGE UP (ref 27–34)
MCHC RBC-ENTMCNC: 29 PG — SIGNIFICANT CHANGE UP (ref 27–34)
MCHC RBC-ENTMCNC: 29 PG — SIGNIFICANT CHANGE UP (ref 27–34)
MCHC RBC-ENTMCNC: 35 GM/DL — SIGNIFICANT CHANGE UP (ref 32–36)
MCHC RBC-ENTMCNC: 35 GM/DL — SIGNIFICANT CHANGE UP (ref 32–36)
MCHC RBC-ENTMCNC: 35.7 GM/DL — SIGNIFICANT CHANGE UP (ref 32–36)
MCHC RBC-ENTMCNC: 35.7 GM/DL — SIGNIFICANT CHANGE UP (ref 32–36)
MCV RBC AUTO: 80.3 FL — SIGNIFICANT CHANGE UP (ref 80–100)
MCV RBC AUTO: 80.3 FL — SIGNIFICANT CHANGE UP (ref 80–100)
MCV RBC AUTO: 82.9 FL — SIGNIFICANT CHANGE UP (ref 80–100)
MCV RBC AUTO: 82.9 FL — SIGNIFICANT CHANGE UP (ref 80–100)
NORMALIZED SCT PPP-RTO: 0.5 RATIO — SIGNIFICANT CHANGE UP (ref 0–1.16)
NORMALIZED SCT PPP-RTO: 0.5 RATIO — SIGNIFICANT CHANGE UP (ref 0–1.16)
NORMALIZED SCT PPP-RTO: SIGNIFICANT CHANGE UP
NORMALIZED SCT PPP-RTO: SIGNIFICANT CHANGE UP
NRBC # BLD: 0 /100 WBCS — SIGNIFICANT CHANGE UP (ref 0–0)
PCO2 BLDA: 30 MMHG — LOW (ref 35–48)
PCO2 BLDA: 30 MMHG — LOW (ref 35–48)
PH BLDA: 7.56 — HIGH (ref 7.35–7.45)
PH BLDA: 7.56 — HIGH (ref 7.35–7.45)
PHOSPHATE SERPL-MCNC: 5.6 MG/DL — HIGH (ref 2.5–4.5)
PHOSPHATE SERPL-MCNC: 5.6 MG/DL — HIGH (ref 2.5–4.5)
PHOSPHATE SERPL-MCNC: 6.1 MG/DL — HIGH (ref 2.5–4.5)
PHOSPHATE SERPL-MCNC: 6.1 MG/DL — HIGH (ref 2.5–4.5)
PLATELET # BLD AUTO: 311 K/UL — SIGNIFICANT CHANGE UP (ref 150–400)
PLATELET # BLD AUTO: 311 K/UL — SIGNIFICANT CHANGE UP (ref 150–400)
PLATELET # BLD AUTO: 322 K/UL — SIGNIFICANT CHANGE UP (ref 150–400)
PLATELET # BLD AUTO: 322 K/UL — SIGNIFICANT CHANGE UP (ref 150–400)
PO2 BLDA: 86 MMHG — SIGNIFICANT CHANGE UP (ref 83–108)
PO2 BLDA: 86 MMHG — SIGNIFICANT CHANGE UP (ref 83–108)
POTASSIUM SERPL-MCNC: 3.2 MMOL/L — LOW (ref 3.5–5.3)
POTASSIUM SERPL-MCNC: 3.2 MMOL/L — LOW (ref 3.5–5.3)
POTASSIUM SERPL-MCNC: 3.6 MMOL/L — SIGNIFICANT CHANGE UP (ref 3.5–5.3)
POTASSIUM SERPL-MCNC: 3.6 MMOL/L — SIGNIFICANT CHANGE UP (ref 3.5–5.3)
POTASSIUM SERPL-SCNC: 3.2 MMOL/L — LOW (ref 3.5–5.3)
POTASSIUM SERPL-SCNC: 3.2 MMOL/L — LOW (ref 3.5–5.3)
POTASSIUM SERPL-SCNC: 3.6 MMOL/L — SIGNIFICANT CHANGE UP (ref 3.5–5.3)
POTASSIUM SERPL-SCNC: 3.6 MMOL/L — SIGNIFICANT CHANGE UP (ref 3.5–5.3)
PROT SERPL-MCNC: 5.2 G/DL — LOW (ref 6–8.3)
PROT SERPL-MCNC: 5.6 G/DL — LOW (ref 6–8.3)
PROT SERPL-MCNC: 5.6 G/DL — LOW (ref 6–8.3)
PROTHROM AB SERPL-ACNC: 15.3 SEC — HIGH (ref 9.5–13)
PROTHROM AB SERPL-ACNC: 15.3 SEC — HIGH (ref 9.5–13)
RBC # BLD: 3.34 M/UL — LOW (ref 4.2–5.8)
RBC # BLD: 3.34 M/UL — LOW (ref 4.2–5.8)
RBC # BLD: 3.35 M/UL — LOW (ref 4.2–5.8)
RBC # BLD: 3.35 M/UL — LOW (ref 4.2–5.8)
RBC # FLD: 13.6 % — SIGNIFICANT CHANGE UP (ref 10.3–14.5)
RBC # FLD: 13.6 % — SIGNIFICANT CHANGE UP (ref 10.3–14.5)
RBC # FLD: 14.2 % — SIGNIFICANT CHANGE UP (ref 10.3–14.5)
RBC # FLD: 14.2 % — SIGNIFICANT CHANGE UP (ref 10.3–14.5)
SAO2 % BLDA: 99 % — SIGNIFICANT CHANGE UP
SAO2 % BLDA: 99 % — SIGNIFICANT CHANGE UP
SODIUM SERPL-SCNC: 147 MMOL/L — HIGH (ref 135–145)
SODIUM SERPL-SCNC: 147 MMOL/L — HIGH (ref 135–145)
SODIUM SERPL-SCNC: 154 MMOL/L — HIGH (ref 135–145)
SODIUM SERPL-SCNC: 154 MMOL/L — HIGH (ref 135–145)
WBC # BLD: 14.08 K/UL — HIGH (ref 3.8–10.5)
WBC # BLD: 14.08 K/UL — HIGH (ref 3.8–10.5)
WBC # BLD: 14.36 K/UL — HIGH (ref 3.8–10.5)
WBC # BLD: 14.36 K/UL — HIGH (ref 3.8–10.5)
WBC # FLD AUTO: 14.08 K/UL — HIGH (ref 3.8–10.5)
WBC # FLD AUTO: 14.08 K/UL — HIGH (ref 3.8–10.5)
WBC # FLD AUTO: 14.36 K/UL — HIGH (ref 3.8–10.5)
WBC # FLD AUTO: 14.36 K/UL — HIGH (ref 3.8–10.5)

## 2023-10-23 PROCEDURE — 99291 CRITICAL CARE FIRST HOUR: CPT

## 2023-10-23 PROCEDURE — 71045 X-RAY EXAM CHEST 1 VIEW: CPT | Mod: 26

## 2023-10-23 PROCEDURE — 99231 SBSQ HOSP IP/OBS SF/LOW 25: CPT

## 2023-10-23 PROCEDURE — 93010 ELECTROCARDIOGRAM REPORT: CPT

## 2023-10-23 PROCEDURE — 93306 TTE W/DOPPLER COMPLETE: CPT | Mod: 26

## 2023-10-23 RX ORDER — POTASSIUM CHLORIDE 20 MEQ
20 PACKET (EA) ORAL
Refills: 0 | Status: COMPLETED | OUTPATIENT
Start: 2023-10-23 | End: 2023-10-24

## 2023-10-23 RX ORDER — HEPARIN SODIUM 5000 [USP'U]/ML
5000 INJECTION INTRAVENOUS; SUBCUTANEOUS EVERY 8 HOURS
Refills: 0 | Status: DISCONTINUED | OUTPATIENT
Start: 2023-10-23 | End: 2023-10-23

## 2023-10-23 RX ORDER — POTASSIUM CHLORIDE 20 MEQ
10 PACKET (EA) ORAL
Refills: 0 | Status: DISCONTINUED | OUTPATIENT
Start: 2023-10-23 | End: 2023-10-23

## 2023-10-23 RX ORDER — SODIUM CHLORIDE 9 MG/ML
1000 INJECTION, SOLUTION INTRAVENOUS
Refills: 0 | Status: DISCONTINUED | OUTPATIENT
Start: 2023-10-23 | End: 2023-10-24

## 2023-10-23 RX ADMIN — Medication 2: at 11:35

## 2023-10-23 RX ADMIN — Medication 3: at 23:01

## 2023-10-23 RX ADMIN — SODIUM CHLORIDE 200 MILLILITER(S): 9 INJECTION, SOLUTION INTRAVENOUS at 20:45

## 2023-10-23 RX ADMIN — AMIODARONE HYDROCHLORIDE 16.7 MG/MIN: 400 TABLET ORAL at 05:13

## 2023-10-23 RX ADMIN — Medication 50 MILLIEQUIVALENT(S): at 22:59

## 2023-10-23 RX ADMIN — HEPARIN SODIUM 0 UNIT(S)/HR: 5000 INJECTION INTRAVENOUS; SUBCUTANEOUS at 03:08

## 2023-10-23 RX ADMIN — SODIUM CHLORIDE 100 MILLILITER(S): 9 INJECTION, SOLUTION INTRAVENOUS at 13:46

## 2023-10-23 RX ADMIN — Medication 3: at 05:34

## 2023-10-23 RX ADMIN — CHLORHEXIDINE GLUCONATE 1 APPLICATION(S): 213 SOLUTION TOPICAL at 05:13

## 2023-10-23 RX ADMIN — PIPERACILLIN AND TAZOBACTAM 25 GRAM(S): 4; .5 INJECTION, POWDER, LYOPHILIZED, FOR SOLUTION INTRAVENOUS at 23:00

## 2023-10-23 RX ADMIN — HEPARIN SODIUM 5000 UNIT(S): 5000 INJECTION INTRAVENOUS; SUBCUTANEOUS at 13:46

## 2023-10-23 RX ADMIN — PIPERACILLIN AND TAZOBACTAM 25 GRAM(S): 4; .5 INJECTION, POWDER, LYOPHILIZED, FOR SOLUTION INTRAVENOUS at 11:34

## 2023-10-23 RX ADMIN — AMIODARONE HYDROCHLORIDE 16.7 MG/MIN: 400 TABLET ORAL at 03:18

## 2023-10-23 RX ADMIN — Medication 3: at 18:41

## 2023-10-23 RX ADMIN — HEPARIN SODIUM 1200 UNIT(S)/HR: 5000 INJECTION INTRAVENOUS; SUBCUTANEOUS at 04:24

## 2023-10-23 NOTE — PROGRESS NOTE ADULT - SUBJECTIVE AND OBJECTIVE BOX
INTERVAL HPI/OVERNIGHT EVENTS:  Pt resting comfortably. No acute complaints.   Feeling better. States no abd pain.  NGT to LWS.  +flatus, -BM.    MEDICATIONS  (STANDING):  chlorhexidine 2% Cloths 1 Application(s) Topical <User Schedule>  heparin   Injectable 5000 Unit(s) SubCutaneous every 8 hours  insulin lispro (ADMELOG) corrective regimen sliding scale   SubCutaneous every 6 hours  norepinephrine Infusion 0.05 MICROgram(s)/kG/Min (3.81 mL/Hr) IV Continuous <Continuous>  piperacillin/tazobactam IVPB.. 3.375 Gram(s) IV Intermittent every 12 hours  vasopressin Infusion 0.04 Unit(s)/Min (6 mL/Hr) IV Continuous <Continuous>    MEDICATIONS  (PRN):  sodium chloride 0.9% Bolus. 100 milliLiter(s) IV Bolus every 5 minutes PRN SBP LESS THAN or EQUAL to 90 mmHg  sodium chloride 0.9% lock flush 10 milliLiter(s) IV Push every 1 hour PRN Pre/post blood products, medications, blood draw, and to maintain line patency    Vital Signs Last 24 Hrs  T(C): 36.5 (23 Oct 2023 06:15), Max: 36.5 (22 Oct 2023 11:53)  T(F): 97.7 (23 Oct 2023 06:15), Max: 97.7 (22 Oct 2023 11:53)  HR: 74 (23 Oct 2023 10:15) (64 - 142)  BP: 125/48 (23 Oct 2023 10:00) (74/43 - 154/57)  BP(mean): 70 (23 Oct 2023 10:00) (53 - 130)  RR: 13 (23 Oct 2023 10:15) (9 - 25)  SpO2: 100% (23 Oct 2023 10:15) (96% - 100%)    Parameters below as of 23 Oct 2023 07:00  Patient On (Oxygen Delivery Method): room air    Physical:  General: A&Ox3. NAD.  Abdomen: Soft nondistended, nontender.  Pelvis: L inguinoscrotal hernia soft, nontender. No skin discoloration    I&O's Detail    22 Oct 2023 07:01  -  23 Oct 2023 07:00  --------------------------------------------------------  IN:    Amiodarone: 199.8 mL    Amiodarone: 83.5 mL    Heparin Infusion: 114 mL    IV PiggyBack: 700 mL    IV PiggyBack: 100 mL    Norepinephrine: 175.4 mL    Norepinephrine: 21 mL    Other (mL): 500 mL    Sodium Bicarbonate: 225 mL    Sodium Bicarbonate: 600 mL    Sodium Chloride 0.9% Bolus: 3000 mL    Vasopressin: 54 mL  Total IN: 5772.7 mL    OUT:    Indwelling Catheter - Urethral (mL): 7485 mL    Nasogastric/Oral tube (mL): 2100 mL    Other (mL): 1000 mL  Total OUT: 35517 mL    Total NET: -4812.3 mL    LABS:                        9.6    14.36 )-----------( 311      ( 23 Oct 2023 02:09 )             26.9             10-23    147<H>  |  106  |  120<H>  ----------------------------<  251<H>  3.6   |  22  |  5.97<H>    Ca    7.6<L>      23 Oct 2023 03:10  Phos  5.6     10-23  Mg     2.0     10-23    TPro  5.6<L>  /  Alb  2.5<L>  /  TBili  0.5  /  DBili  x   /  AST  44<H>  /  ALT  47  /  AlkPhos  81  10-23

## 2023-10-23 NOTE — CHART NOTE - NSCHARTNOTEFT_GEN_A_CORE
Right axillary arterial line was removed under aseptic conditions. Appropriate pressure was applied for 5 min to minimize blood loss. Gauze and adhesive tape was applied and the site was clean.

## 2023-10-23 NOTE — PROGRESS NOTE ADULT - SUBJECTIVE AND OBJECTIVE BOX
INTERVAL HPI/OVERNIGHT EVENTS: ***    PRESSORS: [ ] YES [ ] NO  WHICH:    ANTIBIOTICS:                      Antimicrobial:  piperacillin/tazobactam IVPB.. 3.375 Gram(s) IV Intermittent every 12 hours    Cardiovascular:  aMIOdarone Infusion 1 mG/Min IV Continuous <Continuous>  aMIOdarone Infusion 0.5 mG/Min IV Continuous <Continuous>  norepinephrine Infusion 0.05 MICROgram(s)/kG/Min IV Continuous <Continuous>    Pulmonary:    Hematalogic:  heparin   Injectable 3000 Unit(s) IV Push every 6 hours PRN  heparin   Injectable 6500 Unit(s) IV Push every 6 hours PRN  heparin  Infusion.  Unit(s)/Hr IV Continuous <Continuous>    Other:  chlorhexidine 2% Cloths 1 Application(s) Topical <User Schedule>  insulin lispro (ADMELOG) corrective regimen sliding scale   SubCutaneous every 6 hours  sodium chloride 0.9% Bolus. 100 milliLiter(s) IV Bolus every 5 minutes PRN  sodium chloride 0.9% lock flush 10 milliLiter(s) IV Push every 1 hour PRN  vasopressin Infusion 0.04 Unit(s)/Min IV Continuous <Continuous>    aMIOdarone Infusion 0.5 mG/Min IV Continuous <Continuous>  aMIOdarone Infusion 1 mG/Min IV Continuous <Continuous>  chlorhexidine 2% Cloths 1 Application(s) Topical <User Schedule>  heparin   Injectable 6500 Unit(s) IV Push every 6 hours PRN  heparin   Injectable 3000 Unit(s) IV Push every 6 hours PRN  heparin  Infusion.  Unit(s)/Hr IV Continuous <Continuous>  insulin lispro (ADMELOG) corrective regimen sliding scale   SubCutaneous every 6 hours  norepinephrine Infusion 0.05 MICROgram(s)/kG/Min IV Continuous <Continuous>  piperacillin/tazobactam IVPB.. 3.375 Gram(s) IV Intermittent every 12 hours  sodium chloride 0.9% Bolus. 100 milliLiter(s) IV Bolus every 5 minutes PRN  sodium chloride 0.9% lock flush 10 milliLiter(s) IV Push every 1 hour PRN  vasopressin Infusion 0.04 Unit(s)/Min IV Continuous <Continuous>    Drug Dosing Weight  Height (cm): 180.3 (22 Oct 2023 13:59)  Weight (kg): 81.3 (22 Oct 2023 13:59)  BMI (kg/m2): 25 (22 Oct 2023 13:59)  BSA (m2): 2.01 (22 Oct 2023 13:59)    CENTRAL LINE: [ ] YES [ ] NO  LOCATION:   DATE INSERTED:  REMOVE: [ ] YES [ ] NO  EXPLAIN:    ODONNELL: [ ] YES [ ] NO    DATE INSERTED:  REMOVE:  [ ] YES [ ] NO  EXPLAIN:    A-LINE:  [ ] YES [ ] NO  LOCATION:   DATE INSERTED:  REMOVE:  [ ] YES [ ] NO  EXPLAIN:    PMH -reviewed admission note, no change since admission    ICU Vital Signs Last 24 Hrs  T(C): 36.5 (23 Oct 2023 06:15), Max: 36.5 (22 Oct 2023 11:53)  T(F): 97.7 (23 Oct 2023 06:15), Max: 97.7 (22 Oct 2023 11:53)  HR: 64 (23 Oct 2023 07:00) (64 - 142)  BP: 147/59 (23 Oct 2023 06:45) (74/43 - 147/59)  BP(mean): 79 (23 Oct 2023 06:45) (53 - 130)  ABP: 139/48 (23 Oct 2023 07:00) (94/49 - 154/73)  ABP(mean): 78 (23 Oct 2023 07:00) (59 - 105)  RR: 17 (23 Oct 2023 07:00) (9 - 25)  SpO2: 99% (23 Oct 2023 07:00) (96% - 100%)    O2 Parameters below as of 23 Oct 2023 07:00  Patient On (Oxygen Delivery Method): room air            ABG - ( 22 Oct 2023 21:21 )  pH, Arterial: 7.51  pH, Blood: x     /  pCO2: 16    /  pO2: 120   / HCO3: 13    / Base Excess: -7.3  /  SaO2: 100                   10-22 @ 07:01  -  10-23 @ 07:00  --------------------------------------------------------  IN: 5772.7 mL / OUT: 12036 mL / NET: -4812.3 mL            PHYSICAL EXAM:    GENERAL: NAD, well-groomed, well-developed  HEAD:  Atraumatic, Normocephalic  EYES: EOMI, PERRLA, conjunctiva and sclera clear  ENMT: No tonsillar erythema, exudates, or enlargement; Moist mucous membranes, Good dentition, No lesions  NECK: Supple, normal appearance, No JVD; Normal thyroid; Trachea midline  NERVOUS SYSTEM:  Alert & Oriented X3, Good concentration; Motor Strength 5/5 B/L upper and lower extremities; DTRs 2+ intact and symmetric  CHEST/LUNG: No chest deformity; Normal percussion bilaterally; No rales, rhonchi, wheezing   HEART: Regular rate and rhythm; No murmurs, rubs, or gallops  ABDOMEN: Soft, Nontender, Nondistended; Bowel sounds present  EXTREMITIES:  2+ Peripheral Pulses, No clubbing, cyanosis, or edema  LYMPH: No lymphadenopathy noted  SKIN: No rashes or lesions; Good capillary refill      LABS:  CBC Full  -  ( 23 Oct 2023 02:09 )  WBC Count : 14.36 K/uL  RBC Count : 3.35 M/uL  Hemoglobin : 9.6 g/dL  Hematocrit : 26.9 %  Platelet Count - Automated : 311 K/uL  Mean Cell Volume : 80.3 fl  Mean Cell Hemoglobin : 28.7 pg  Mean Cell Hemoglobin Concentration : 35.7 gm/dL  Auto Neutrophil # : x  Auto Lymphocyte # : x  Auto Monocyte # : x  Auto Eosinophil # : x  Auto Basophil # : x  Auto Neutrophil % : x  Auto Lymphocyte % : x  Auto Monocyte % : x  Auto Eosinophil % : x  Auto Basophil % : x    10-23    147<H>  |  106  |  120<H>  ----------------------------<  251<H>  3.6   |  22  |  5.97<H>    Ca    7.6<L>      23 Oct 2023 03:10  Phos  5.6     10-23  Mg     2.0     10-23    TPro  5.6<L>  /  Alb  2.5<L>  /  TBili  0.5  /  DBili  x   /  AST  44<H>  /  ALT  47  /  AlkPhos  81  10-23    PT/INR - ( 22 Oct 2023 11:25 )   PT: 14.5 sec;   INR: 1.28 ratio         PTT - ( 23 Oct 2023 02:09 )  PTT:>200.0 sec  Urinalysis Basic - ( 23 Oct 2023 03:10 )    Color: x / Appearance: x / SG: x / pH: x  Gluc: 251 mg/dL / Ketone: x  / Bili: x / Urobili: x   Blood: x / Protein: x / Nitrite: x   Leuk Esterase: x / RBC: x / WBC x   Sq Epi: x / Non Sq Epi: x / Bacteria: x          RADIOLOGY & ADDITIONAL STUDIES REVIEWED:  ***    GOALS OF CARE DISCUSSION WITH PATIENT/FAMILY/PROXY:    CRITICAL CARE TIME SPENT: 35 minutes INTERVAL HPI/OVERNIGHT EVENTS: Patient is seen at bedside. Patient reports he feels much better and denies any complains at this time. Patient denies any subjective fevers, chills, headache, nausea, vomiting, abdominal pain. No other complains were reported by the patient. Patient is now hemodynamically stable and off vasopressors. Patient's AKIN is also improving with patient producing significant amount of urine     PRESSORS: [ ] YES [ ] NO  WHICH:    ANTIBIOTICS:                      Antimicrobial:  piperacillin/tazobactam IVPB.. 3.375 Gram(s) IV Intermittent every 12 hours    Cardiovascular:  aMIOdarone Infusion 1 mG/Min IV Continuous <Continuous>  aMIOdarone Infusion 0.5 mG/Min IV Continuous <Continuous>  norepinephrine Infusion 0.05 MICROgram(s)/kG/Min IV Continuous <Continuous>    Pulmonary:    Hematalogic:  heparin   Injectable 3000 Unit(s) IV Push every 6 hours PRN  heparin   Injectable 6500 Unit(s) IV Push every 6 hours PRN  heparin  Infusion.  Unit(s)/Hr IV Continuous <Continuous>    Other:  chlorhexidine 2% Cloths 1 Application(s) Topical <User Schedule>  insulin lispro (ADMELOG) corrective regimen sliding scale   SubCutaneous every 6 hours  sodium chloride 0.9% Bolus. 100 milliLiter(s) IV Bolus every 5 minutes PRN  sodium chloride 0.9% lock flush 10 milliLiter(s) IV Push every 1 hour PRN  vasopressin Infusion 0.04 Unit(s)/Min IV Continuous <Continuous>    aMIOdarone Infusion 0.5 mG/Min IV Continuous <Continuous>  aMIOdarone Infusion 1 mG/Min IV Continuous <Continuous>  chlorhexidine 2% Cloths 1 Application(s) Topical <User Schedule>  heparin   Injectable 6500 Unit(s) IV Push every 6 hours PRN  heparin   Injectable 3000 Unit(s) IV Push every 6 hours PRN  heparin  Infusion.  Unit(s)/Hr IV Continuous <Continuous>  insulin lispro (ADMELOG) corrective regimen sliding scale   SubCutaneous every 6 hours  norepinephrine Infusion 0.05 MICROgram(s)/kG/Min IV Continuous <Continuous>  piperacillin/tazobactam IVPB.. 3.375 Gram(s) IV Intermittent every 12 hours  sodium chloride 0.9% Bolus. 100 milliLiter(s) IV Bolus every 5 minutes PRN  sodium chloride 0.9% lock flush 10 milliLiter(s) IV Push every 1 hour PRN  vasopressin Infusion 0.04 Unit(s)/Min IV Continuous <Continuous>    Drug Dosing Weight  Height (cm): 180.3 (22 Oct 2023 13:59)  Weight (kg): 81.3 (22 Oct 2023 13:59)  BMI (kg/m2): 25 (22 Oct 2023 13:59)  BSA (m2): 2.01 (22 Oct 2023 13:59)    CENTRAL LINE: [ ] YES [ ] NO  LOCATION:   DATE INSERTED:  REMOVE: [ ] YES [ ] NO  EXPLAIN:    ODONNELL: [ ] YES [ ] NO    DATE INSERTED:  REMOVE:  [ ] YES [ ] NO  EXPLAIN:    A-LINE:  [ ] YES [ ] NO  LOCATION:   DATE INSERTED:  REMOVE:  [ ] YES [ ] NO  EXPLAIN:    PMH -reviewed admission note, no change since admission    ICU Vital Signs Last 24 Hrs  T(C): 36.5 (23 Oct 2023 06:15), Max: 36.5 (22 Oct 2023 11:53)  T(F): 97.7 (23 Oct 2023 06:15), Max: 97.7 (22 Oct 2023 11:53)  HR: 64 (23 Oct 2023 07:00) (64 - 142)  BP: 147/59 (23 Oct 2023 06:45) (74/43 - 147/59)  BP(mean): 79 (23 Oct 2023 06:45) (53 - 130)  ABP: 139/48 (23 Oct 2023 07:00) (94/49 - 154/73)  ABP(mean): 78 (23 Oct 2023 07:00) (59 - 105)  RR: 17 (23 Oct 2023 07:00) (9 - 25)  SpO2: 99% (23 Oct 2023 07:00) (96% - 100%)    O2 Parameters below as of 23 Oct 2023 07:00  Patient On (Oxygen Delivery Method): room air            ABG - ( 22 Oct 2023 21:21 )  pH, Arterial: 7.51  pH, Blood: x     /  pCO2: 16    /  pO2: 120   / HCO3: 13    / Base Excess: -7.3  /  SaO2: 100                   10-22 @ 07:01  -  10-23 @ 07:00  --------------------------------------------------------  IN: 5772.7 mL / OUT: 14944 mL / NET: -4812.3 mL            PHYSICAL EXAM:    GENERAL: NAD, well-groomed, well-developed  HEAD:  Atraumatic, Normocephalic  EYES: EOMI, PERRLA, conjunctiva and sclera clear  ENMT: No tonsillar erythema, exudates, or enlargement; Moist mucous membranes, Good dentition, No lesions  NECK: Supple, normal appearance, No JVD; Normal thyroid; Trachea midline  NERVOUS SYSTEM:  Alert & Oriented X3, Good concentration; Motor Strength 5/5 B/L upper and lower extremities; DTRs 2+ intact and symmetric  CHEST/LUNG: No chest deformity; Normal percussion bilaterally; No rales, rhonchi, wheezing   HEART: Regular rate and rhythm; No murmurs, rubs, or gallops  ABDOMEN: Soft, Nontender, Nondistended; Bowel sounds present  EXTREMITIES:  2+ Peripheral Pulses, No clubbing, cyanosis, or edema  LYMPH: No lymphadenopathy noted  SKIN: No rashes or lesions; Good capillary refill      LABS:  CBC Full  -  ( 23 Oct 2023 02:09 )  WBC Count : 14.36 K/uL  RBC Count : 3.35 M/uL  Hemoglobin : 9.6 g/dL  Hematocrit : 26.9 %  Platelet Count - Automated : 311 K/uL  Mean Cell Volume : 80.3 fl  Mean Cell Hemoglobin : 28.7 pg  Mean Cell Hemoglobin Concentration : 35.7 gm/dL  Auto Neutrophil # : x  Auto Lymphocyte # : x  Auto Monocyte # : x  Auto Eosinophil # : x  Auto Basophil # : x  Auto Neutrophil % : x  Auto Lymphocyte % : x  Auto Monocyte % : x  Auto Eosinophil % : x  Auto Basophil % : x    10-23    147<H>  |  106  |  120<H>  ----------------------------<  251<H>  3.6   |  22  |  5.97<H>    Ca    7.6<L>      23 Oct 2023 03:10  Phos  5.6     10-23  Mg     2.0     10-23    TPro  5.6<L>  /  Alb  2.5<L>  /  TBili  0.5  /  DBili  x   /  AST  44<H>  /  ALT  47  /  AlkPhos  81  10-23    PT/INR - ( 22 Oct 2023 11:25 )   PT: 14.5 sec;   INR: 1.28 ratio         PTT - ( 23 Oct 2023 02:09 )  PTT:>200.0 sec  Urinalysis Basic - ( 23 Oct 2023 03:10 )    Color: x / Appearance: x / SG: x / pH: x  Gluc: 251 mg/dL / Ketone: x  / Bili: x / Urobili: x   Blood: x / Protein: x / Nitrite: x   Leuk Esterase: x / RBC: x / WBC x   Sq Epi: x / Non Sq Epi: x / Bacteria: x          RADIOLOGY & ADDITIONAL STUDIES REVIEWED:  ***    GOALS OF CARE DISCUSSION WITH PATIENT/FAMILY/PROXY:    CRITICAL CARE TIME SPENT: 35 minutes INTERVAL HPI/OVERNIGHT EVENTS: Patient is seen at bedside. Patient reports he feels much better and denies any complains at this time. Patient denies any subjective fevers, chills, headache, nausea, vomiting, abdominal pain. No other complains were reported by the patient. Patient is now hemodynamically stable and off vasopressors. Patient's AKIN is also improving with patient producing significant amount of urine post HD session yesterday. Patient also converted to sinus rhythm this AM.     PRESSORS: [ ] YES [X] NO  WHICH:    ANTIBIOTICS: Zosyn                     Antimicrobial:  piperacillin/tazobactam IVPB.. 3.375 Gram(s) IV Intermittent every 12 hours    Cardiovascular:  aMIOdarone Infusion 1 mG/Min IV Continuous <Continuous>  aMIOdarone Infusion 0.5 mG/Min IV Continuous <Continuous>  norepinephrine Infusion 0.05 MICROgram(s)/kG/Min IV Continuous <Continuous>    Pulmonary:    Hematalogic:  heparin   Injectable 3000 Unit(s) IV Push every 6 hours PRN  heparin   Injectable 6500 Unit(s) IV Push every 6 hours PRN  heparin  Infusion.  Unit(s)/Hr IV Continuous <Continuous>    Other:  chlorhexidine 2% Cloths 1 Application(s) Topical <User Schedule>  insulin lispro (ADMELOG) corrective regimen sliding scale   SubCutaneous every 6 hours  sodium chloride 0.9% Bolus. 100 milliLiter(s) IV Bolus every 5 minutes PRN  sodium chloride 0.9% lock flush 10 milliLiter(s) IV Push every 1 hour PRN  vasopressin Infusion 0.04 Unit(s)/Min IV Continuous <Continuous>    aMIOdarone Infusion 0.5 mG/Min IV Continuous <Continuous>  aMIOdarone Infusion 1 mG/Min IV Continuous <Continuous>  chlorhexidine 2% Cloths 1 Application(s) Topical <User Schedule>  heparin   Injectable 6500 Unit(s) IV Push every 6 hours PRN  heparin   Injectable 3000 Unit(s) IV Push every 6 hours PRN  heparin  Infusion.  Unit(s)/Hr IV Continuous <Continuous>  insulin lispro (ADMELOG) corrective regimen sliding scale   SubCutaneous every 6 hours  norepinephrine Infusion 0.05 MICROgram(s)/kG/Min IV Continuous <Continuous>  piperacillin/tazobactam IVPB.. 3.375 Gram(s) IV Intermittent every 12 hours  sodium chloride 0.9% Bolus. 100 milliLiter(s) IV Bolus every 5 minutes PRN  sodium chloride 0.9% lock flush 10 milliLiter(s) IV Push every 1 hour PRN  vasopressin Infusion 0.04 Unit(s)/Min IV Continuous <Continuous>    Drug Dosing Weight  Height (cm): 180.3 (22 Oct 2023 13:59)  Weight (kg): 81.3 (22 Oct 2023 13:59)  BMI (kg/m2): 25 (22 Oct 2023 13:59)  BSA (m2): 2.01 (22 Oct 2023 13:59)    CENTRAL LINE: [X] YES [ ] NO  LOCATION: [facility]  DATE INSERTED:  REMOVE: [ ] YES [ ] NO  EXPLAIN:    ODONNELL: [ ] YES [ ] NO    DATE INSERTED:  REMOVE:  [ ] YES [ ] NO  EXPLAIN:    A-LINE:  [ ] YES [ ] NO  LOCATION:   DATE INSERTED:  REMOVE:  [ ] YES [ ] NO  EXPLAIN:    PMH -reviewed admission note, no change since admission    ICU Vital Signs Last 24 Hrs  T(C): 36.5 (23 Oct 2023 06:15), Max: 36.5 (22 Oct 2023 11:53)  T(F): 97.7 (23 Oct 2023 06:15), Max: 97.7 (22 Oct 2023 11:53)  HR: 64 (23 Oct 2023 07:00) (64 - 142)  BP: 147/59 (23 Oct 2023 06:45) (74/43 - 147/59)  BP(mean): 79 (23 Oct 2023 06:45) (53 - 130)  ABP: 139/48 (23 Oct 2023 07:00) (94/49 - 154/73)  ABP(mean): 78 (23 Oct 2023 07:00) (59 - 105)  RR: 17 (23 Oct 2023 07:00) (9 - 25)  SpO2: 99% (23 Oct 2023 07:00) (96% - 100%)    O2 Parameters below as of 23 Oct 2023 07:00  Patient On (Oxygen Delivery Method): room air            ABG - ( 22 Oct 2023 21:21 )  pH, Arterial: 7.51  pH, Blood: x     /  pCO2: 16    /  pO2: 120   / HCO3: 13    / Base Excess: -7.3  /  SaO2: 100                   10-22 @ 07:01  -  10-23 @ 07:00  --------------------------------------------------------  IN: 5772.7 mL / OUT: 05856 mL / NET: -4812.3 mL            PHYSICAL EXAM:    GENERAL: NAD, well-groomed, well-developed  HEAD:  Atraumatic, Normocephalic  EYES: EOMI, PERRLA, conjunctiva and sclera clear  ENMT: No tonsillar erythema, exudates, or enlargement; Moist mucous membranes, Good dentition, No lesions  NECK: Supple, normal appearance, No JVD; Normal thyroid; Trachea midline  NERVOUS SYSTEM:  Alert & Oriented X3, Good concentration; Motor Strength 5/5 B/L upper and lower extremities; DTRs 2+ intact and symmetric  CHEST/LUNG: No chest deformity; Normal percussion bilaterally; No rales, rhonchi, wheezing   HEART: Regular rate and rhythm; No murmurs, rubs, or gallops  ABDOMEN: Soft, Nontender, Nondistended; Bowel sounds present  EXTREMITIES:  2+ Peripheral Pulses, No clubbing, cyanosis, or edema  LYMPH: No lymphadenopathy noted  SKIN: No rashes or lesions; Good capillary refill      LABS:  CBC Full  -  ( 23 Oct 2023 02:09 )  WBC Count : 14.36 K/uL  RBC Count : 3.35 M/uL  Hemoglobin : 9.6 g/dL  Hematocrit : 26.9 %  Platelet Count - Automated : 311 K/uL  Mean Cell Volume : 80.3 fl  Mean Cell Hemoglobin : 28.7 pg  Mean Cell Hemoglobin Concentration : 35.7 gm/dL  Auto Neutrophil # : x  Auto Lymphocyte # : x  Auto Monocyte # : x  Auto Eosinophil # : x  Auto Basophil # : x  Auto Neutrophil % : x  Auto Lymphocyte % : x  Auto Monocyte % : x  Auto Eosinophil % : x  Auto Basophil % : x    10-23    147<H>  |  106  |  120<H>  ----------------------------<  251<H>  3.6   |  22  |  5.97<H>    Ca    7.6<L>      23 Oct 2023 03:10  Phos  5.6     10-23  Mg     2.0     10-23    TPro  5.6<L>  /  Alb  2.5<L>  /  TBili  0.5  /  DBili  x   /  AST  44<H>  /  ALT  47  /  AlkPhos  81  10-23    PT/INR - ( 22 Oct 2023 11:25 )   PT: 14.5 sec;   INR: 1.28 ratio         PTT - ( 23 Oct 2023 02:09 )  PTT:>200.0 sec  Urinalysis Basic - ( 23 Oct 2023 03:10 )    Color: x / Appearance: x / SG: x / pH: x  Gluc: 251 mg/dL / Ketone: x  / Bili: x / Urobili: x   Blood: x / Protein: x / Nitrite: x   Leuk Esterase: x / RBC: x / WBC x   Sq Epi: x / Non Sq Epi: x / Bacteria: x          RADIOLOGY & ADDITIONAL STUDIES REVIEWED:  ***    GOALS OF CARE DISCUSSION WITH PATIENT/FAMILY/PROXY:    CRITICAL CARE TIME SPENT: 35 minutes INTERVAL HPI/OVERNIGHT EVENTS: Patient is seen at bedside. Patient reports he feels much better and denies any complains at this time. Patient denies any subjective fevers, chills, headache, nausea, vomiting, abdominal pain. No other complains were reported by the patient. Patient is now hemodynamically stable and off vasopressors. Patient's AKIN is also improving with patient producing significant amount of urine post HD session yesterday. Patient also converted to sinus rhythm this AM.     PRESSORS: [ ] YES [X] NO  WHICH:    ANTIBIOTICS: Zosyn                     Antimicrobial:  piperacillin/tazobactam IVPB.. 3.375 Gram(s) IV Intermittent every 12 hours    Cardiovascular:  aMIOdarone Infusion 1 mG/Min IV Continuous <Continuous>  aMIOdarone Infusion 0.5 mG/Min IV Continuous <Continuous>  norepinephrine Infusion 0.05 MICROgram(s)/kG/Min IV Continuous <Continuous>    Pulmonary:    Hematalogic:  heparin   Injectable 3000 Unit(s) IV Push every 6 hours PRN  heparin   Injectable 6500 Unit(s) IV Push every 6 hours PRN  heparin  Infusion.  Unit(s)/Hr IV Continuous <Continuous>    Other:  chlorhexidine 2% Cloths 1 Application(s) Topical <User Schedule>  insulin lispro (ADMELOG) corrective regimen sliding scale   SubCutaneous every 6 hours  sodium chloride 0.9% Bolus. 100 milliLiter(s) IV Bolus every 5 minutes PRN  sodium chloride 0.9% lock flush 10 milliLiter(s) IV Push every 1 hour PRN  vasopressin Infusion 0.04 Unit(s)/Min IV Continuous <Continuous>    aMIOdarone Infusion 0.5 mG/Min IV Continuous <Continuous>  aMIOdarone Infusion 1 mG/Min IV Continuous <Continuous>  chlorhexidine 2% Cloths 1 Application(s) Topical <User Schedule>  heparin   Injectable 6500 Unit(s) IV Push every 6 hours PRN  heparin   Injectable 3000 Unit(s) IV Push every 6 hours PRN  heparin  Infusion.  Unit(s)/Hr IV Continuous <Continuous>  insulin lispro (ADMELOG) corrective regimen sliding scale   SubCutaneous every 6 hours  norepinephrine Infusion 0.05 MICROgram(s)/kG/Min IV Continuous <Continuous>  piperacillin/tazobactam IVPB.. 3.375 Gram(s) IV Intermittent every 12 hours  sodium chloride 0.9% Bolus. 100 milliLiter(s) IV Bolus every 5 minutes PRN  sodium chloride 0.9% lock flush 10 milliLiter(s) IV Push every 1 hour PRN  vasopressin Infusion 0.04 Unit(s)/Min IV Continuous <Continuous>    Drug Dosing Weight  Height (cm): 180.3 (22 Oct 2023 13:59)  Weight (kg): 81.3 (22 Oct 2023 13:59)  BMI (kg/m2): 25 (22 Oct 2023 13:59)  BSA (m2): 2.01 (22 Oct 2023 13:59)    CENTRAL LINE: [X] YES [ ] NO  LOCATION: [facility]  DATE INSERTED: 10/22  REMOVE: [ ] YES [ ] NO  EXPLAIN:    ODONNELL: [X] YES [ ] NO    DATE INSERTED: 10/22  REMOVE:  [ ] YES [ ] NO  EXPLAIN:    A-LINE:  [X] YES [ ] NO  LOCATION: [facility]  DATE INSERTED: 10/22  REMOVE:  [ ] YES [ ] NO  EXPLAIN:    PMH -reviewed admission note, no change since admission    ICU Vital Signs Last 24 Hrs  T(C): 36.5 (23 Oct 2023 06:15), Max: 36.5 (22 Oct 2023 11:53)  T(F): 97.7 (23 Oct 2023 06:15), Max: 97.7 (22 Oct 2023 11:53)  HR: 64 (23 Oct 2023 07:00) (64 - 142)  BP: 147/59 (23 Oct 2023 06:45) (74/43 - 147/59)  BP(mean): 79 (23 Oct 2023 06:45) (53 - 130)  ABP: 139/48 (23 Oct 2023 07:00) (94/49 - 154/73)  ABP(mean): 78 (23 Oct 2023 07:00) (59 - 105)  RR: 17 (23 Oct 2023 07:00) (9 - 25)  SpO2: 99% (23 Oct 2023 07:00) (96% - 100%)    O2 Parameters below as of 23 Oct 2023 07:00  Patient On (Oxygen Delivery Method): room air            ABG - ( 22 Oct 2023 21:21 )  pH, Arterial: 7.51  pH, Blood: x     /  pCO2: 16    /  pO2: 120   / HCO3: 13    / Base Excess: -7.3  /  SaO2: 100                   10-22 @ 07:01  -  10-23 @ 07:00  --------------------------------------------------------  IN: 5772.7 mL / OUT: 76635 mL / NET: -4812.3 mL            PHYSICAL EXAM:    GENERAL: NAD, frail looking, sump tube  HEAD:  Atraumatic, Normocephalic  EYES: EOMI, PERRLA, conjunctiva and sclera clear  ENMT: no exudates, or enlargement; Moist mucous membranes,   NECK: Supple, normal appearance, Trachea midline  NERVOUS SYSTEM:  Alert & Oriented X3, Good concentration;  CHEST/LUNG: No chest deformity; Normal auscultation bilaterally; No rales, rhonchi, wheezing   HEART: Regular rate and rhythm; No murmurs, rubs, or gallops  ABDOMEN: Soft, Nontender, Nondistended; Bowel sounds present  EXTREMITIES:  2+ Peripheral Pulses, No edema  LYMPH: No lymphadenopathy noted  SKIN: No rashes or lesions; Good capillary refill      LABS:  CBC Full  -  ( 23 Oct 2023 02:09 )  WBC Count : 14.36 K/uL  RBC Count : 3.35 M/uL  Hemoglobin : 9.6 g/dL  Hematocrit : 26.9 %  Platelet Count - Automated : 311 K/uL  Mean Cell Volume : 80.3 fl  Mean Cell Hemoglobin : 28.7 pg  Mean Cell Hemoglobin Concentration : 35.7 gm/dL  Auto Neutrophil # : x  Auto Lymphocyte # : x  Auto Monocyte # : x  Auto Eosinophil # : x  Auto Basophil # : x  Auto Neutrophil % : x  Auto Lymphocyte % : x  Auto Monocyte % : x  Auto Eosinophil % : x  Auto Basophil % : x    10-23    147<H>  |  106  |  120<H>  ----------------------------<  251<H>  3.6   |  22  |  5.97<H>    Ca    7.6<L>      23 Oct 2023 03:10  Phos  5.6     10-23  Mg     2.0     10-23    TPro  5.6<L>  /  Alb  2.5<L>  /  TBili  0.5  /  DBili  x   /  AST  44<H>  /  ALT  47  /  AlkPhos  81  10-23    PT/INR - ( 22 Oct 2023 11:25 )   PT: 14.5 sec;   INR: 1.28 ratio         PTT - ( 23 Oct 2023 02:09 )  PTT:>200.0 sec  Urinalysis Basic - ( 23 Oct 2023 03:10 )    Color: x / Appearance: x / SG: x / pH: x  Gluc: 251 mg/dL / Ketone: x  / Bili: x / Urobili: x   Blood: x / Protein: x / Nitrite: x   Leuk Esterase: x / RBC: x / WBC x   Sq Epi: x / Non Sq Epi: x / Bacteria: x          RADIOLOGY & ADDITIONAL STUDIES REVIEWED:  ***    GOALS OF CARE DISCUSSION WITH PATIENT/FAMILY/PROXY:    CRITICAL CARE TIME SPENT: 35 minutes

## 2023-10-23 NOTE — PROGRESS NOTE ADULT - SUBJECTIVE AND OBJECTIVE BOX
Nixon Schultz MD  Nephrology      AMANDA SULLIVAN LAWRENCE  78y  Patient is a 78y old  Male who presents with a chief complaint of acute renal failure (23 Oct 2023 07:25)    HPI:  More alert today. No new complaints. AKIN, s/p emergent HD last night for severe Metabolic Acidosis    HEALTH ISSUES - PROBLEM Dx:  .        MEDICATIONS  (STANDING):  chlorhexidine 2% Cloths 1 Application(s) Topical <User Schedule>  heparin   Injectable 5000 Unit(s) SubCutaneous every 8 hours  insulin lispro (ADMELOG) corrective regimen sliding scale   SubCutaneous every 6 hours  lactated ringers. 1000 milliLiter(s) (100 mL/Hr) IV Continuous <Continuous>  piperacillin/tazobactam IVPB.. 3.375 Gram(s) IV Intermittent every 12 hours    MEDICATIONS  (PRN):  sodium chloride 0.9% Bolus. 100 milliLiter(s) IV Bolus every 5 minutes PRN SBP LESS THAN or EQUAL to 90 mmHg  sodium chloride 0.9% lock flush 10 milliLiter(s) IV Push every 1 hour PRN Pre/post blood products, medications, blood draw, and to maintain line patency    Vital Signs Last 24 Hrs  T(C): 36.5 (23 Oct 2023 12:00), Max: 36.5 (23 Oct 2023 06:15)  T(F): 97.7 (23 Oct 2023 12:00), Max: 97.7 (23 Oct 2023 06:15)  HR: 90 (23 Oct 2023 14:00) (64 - 142)  BP: 153/54 (23 Oct 2023 12:00) (84/45 - 154/57)  BP(mean): 80 (23 Oct 2023 12:00) (57 - 130)  RR: 17 (23 Oct 2023 14:00) (9 - 22)  SpO2: 99% (23 Oct 2023 14:00) (96% - 100%)    Parameters below as of 23 Oct 2023 07:00  Patient On (Oxygen Delivery Method): room air      Daily     Daily Weight in k.7 (23 Oct 2023 07:00)    PHYSICAL EXAM:  Constitutional: He  appears comfortable and not distressed. Not diaphoretic.    Neck:  The thyroid is normal. Trachea is midline.     Breasts: Normal examination.    Respiratory: The lungs are clear to auscultation. No dullness and expansion is normal.    Cardiovascular: S1 and S2 are normal. No mummurs, rubs or gallops are present.    Gastrointestinal: The abdomen is soft. No tenderness is present. Left inguinal hernia.    Genitourinary: The bladder is not distended. No CVA tenderness is present.    Extremities: No edema is noted. No deformities are present.    Neurological: Cognition is normal. Tone, power and sensation are normal. Gait is steady.    Skin: No lesions are seen  or palpated.    Lymph Nodes: No lymphadenopathy is present.    Psychiatric: Mood is appropriate. No hallucinations or flight of ideas are noted.                              9.6    14.36 )-----------( 311      ( 23 Oct 2023 02:09 )             26.9     10    147<H>  |  106  |  120<H>  ----------------------------<  251<H>  3.6   |  22  |  5.97<H>    Ca    7.6<L>      23 Oct 2023 03:10  Phos  5.6     10-23  Mg     2.0     10-23    TPro  5.6<L>  /  Alb  2.5<L>  /  TBili  0.5  /  DBili  x   /  AST  44<H>  /  ALT  47  /  AlkPhos  81  10-23

## 2023-10-23 NOTE — PROGRESS NOTE ADULT - ASSESSMENT
78y.o. Male with chronic L inguinoscrotal hernia, reduced by surgical team    -s/w pt regarding surgical management. Pt states he has had hernia for many years, and has seen multiple surgeons regarding surgery. Pt currently not interested in surgical repair if not emergently indicated.  -Will treat obstruction conservatively. Monitor NGT output. NGT removal and diet advancement as symptoms improve.  -con't ICU care for sepsis.     This note and its recommendations herein are preliminary until such time as cosigned by an attending.

## 2023-10-23 NOTE — PROGRESS NOTE ADULT - ASSESSMENT
Patient is a 77 y/o M, lives alone at home, ambulates w/ a cane. He has PMHx of HTN, DM, BPH. He is a poor historian and collateral Hx was obtained from his daughter, Ms. Rosa Mcneil ([phone]) at bedside. Admitted for acute renal failure    #Acute Renal Failure  #Metabolic Acidosis  #Hyperkalemia  #Septic Shock  #Acute Metabolic Encephalopathy  #Incarcerated Ventral Hernia / Large Bowel Obstruction  #Hypertension  #Type 2 Diabetes Mellitus  #Benign Prostatic Hyperplasia    _________CNS___________    #Acute Metabolic Encephalopathy  - baseline: AAOx3  - currently AAOx1 (confused)  - CTA H/N - neg CVA  - UTox - neg  - likely infectious vs. metabolic  - treat underlying cause    _________CVS___________    #Septic Shock  - elevated WBC 19, lactate 3.8 and hypotension  - treated for UTI w/ Cipro outpatient  - UA+  - start Zosyn  - ID (Dr. Bravo) consulted  - f/u UCx, BCx    #Hypertension  - home meds - amlodipine, benazepril  - BP monitoring  - hold benazepril in the setting of AKIN  - hold amlodipine in setting of shock    _________ RESP__________    #no issues    __________GI____________    #Incarcerated Ventral Hernia  #Large Bowel Obstruction  - CT Abd done  - NPO for now  - NGT to suction  - Surgery consulted - attempt for manual reduction    ________ RENAL__________    #Acute Renal Failure  - SCr (baseline) - 0.96  - BUN/Scr - 206/12.60  - FeNa - 3.2 (intrinsic)  - f/u ESR, CRP, HIV, Hepatitis, GEMMA, ANCA, C3, SPEP/UPEP  - monitor BMP Q4  - avoid nephrotoxic agents    #Metabolic Acidosis  - Lactate - 3.8  - AG 32  - s/p 150 mg NaHCO3 x 2  - started Bicarb drip  - monitor ABG    #Lactic Acidosis  - Lactate 3.8>7.5  - infection vs. ischemia 2/2 LBO  - s/p 2L NS bolus  - monitor lactate q2    #Hyperkalemia  - K - 6.3  - EKG - NSR, TWI on anterior leads  - Trop - neg  - s/p Ca Gluc + hyperkalemia cocktail (insulin/dextrose + lokelma)  - monitor BMP    #BPH  - home meds - tamsulosin, finasteride  - hold home meds while NPO    _________MSK___________    #Elevated CPK  - 466  - continue IVF hydration    __________ID____________    #Septic Shock  - see above    _________ENDO__________    #DM  - home meds - metformin, glipizide  - hold PO meds  - start insulin sliding scale q6 while NPO  - AccuCheks Q6  - f/u a1c    _______HEME/ONC_______    #no issues    _________SKIN____________    - L Ext dwell 10/22/2023  - R shiley 10/22/2023  - LIJ 10/22/2023  - place arterial line    ________Prophylaxis_______    #DVT - heparin SQ     __________GOC/ DISPO___________    GOC -   DISPO - Admit to ICU Patient is a 77 y/o M, lives alone at home, ambulates w/ a cane. He has PMHx of HTN, DM, BPH. He is a poor historian and collateral Hx was obtained from his daughter, Ms. Rosa Mcneil ([phone]) at bedside. Admitted for acute renal failure    #Acute Renal Failure  #Metabolic Acidosis  #Hyperkalemia  #Septic Shock  #Acute Metabolic Encephalopathy  #Incarcerated Ventral Hernia / Large Bowel Obstruction  #Hypertension  #Type 2 Diabetes Mellitus  #Benign Prostatic Hyperplasia    _________CNS___________    #Acute Metabolic Encephalopathy  - Resolved  - baseline: AAOx3  - currently AAOx3   - CTA H/N - neg CVA  - UTox - neg  - likely 2/2 to septic shock 2/2 to large bowel obstruction.     _________CVS___________    #Septic Shock  - Likely 2/2 to large bowel obstruction vs UTI  - P/w elevated WBC 19, lactate 3.8 and hypotension  - Lactate 3.8>7.5>1.5  - treated for UTI w/ Cipro outpatient  - UA+  - CT abdomen/pelvis: start Zosyn  - ID (Dr. Bravo) consulted  - f/u UCx, BCx    #Hypertension  - home meds - amlodipine, benazepril  - BP monitoring  - hold benazepril in the setting of AKIN  - hold amlodipine in setting of shock    _________ RESP__________    #no issues    __________GI____________    #Incarcerated Ventral Hernia  #Large Bowel Obstruction  - CT Abd done  - NPO for now  - NGT to suction  - Surgery consulted - attempt for manual reduction    ________ RENAL__________    #Acute Renal Failure  - SCr (baseline) - 0.96  - BUN/Scr - 206/12.60  - FeNa - 3.2 (intrinsic)  - f/u ESR, CRP, HIV, Hepatitis, GEMMA, ANCA, C3, SPEP/UPEP  - monitor BMP Q4  - avoid nephrotoxic agents    #Metabolic Acidosis  - Lactate - 3.8  - AG 32  - s/p 150 mg NaHCO3 x 2  - started Bicarb drip  - monitor ABG    #Lactic Acidosis  - Lactate 3.8>7.5  - infection vs. ischemia 2/2 LBO  - s/p 2L NS bolus  - monitor lactate q2    #Hyperkalemia  - K - 6.3  - EKG - NSR, TWI on anterior leads  - Trop - neg  - s/p Ca Gluc + hyperkalemia cocktail (insulin/dextrose + lokelma)  - monitor BMP    #BPH  - home meds - tamsulosin, finasteride  - hold home meds while NPO    _________MSK___________    #Elevated CPK  - 466  - continue IVF hydration    __________ID____________    #Septic Shock  - see above    _________ENDO__________    #DM  - home meds - metformin, glipizide  - hold PO meds  - start insulin sliding scale q6 while NPO  - AccuCheks Q6  - f/u a1c    _______HEME/ONC_______    #no issues    _________SKIN____________    - L Ext dwell 10/22/2023  - R marlo 10/22/2023  - LIDAY 10/22/2023  - place arterial line    ________Prophylaxis_______    #DVT - heparin SQ     __________GOC/ DISPO___________    GOC -   DISPO - Admit to ICU Patient is a 79 y/o M, lives alone at home, ambulates w/ a cane. He has PMHx of HTN, DM, BPH. He is a poor historian and collateral Hx was obtained from his daughter, Ms. Rosa Mcneil ([phone]) at bedside. Admitted for acute renal failure    #Acute Renal Failure  #Metabolic Acidosis  #Hyperkalemia  #Septic Shock  #Acute Metabolic Encephalopathy  #Incarcerated Ventral Hernia / Large Bowel Obstruction  #Hypertension  #Type 2 Diabetes Mellitus  #Benign Prostatic Hyperplasia    _________CNS___________    #Acute Metabolic Encephalopathy  - Resolved  - baseline: AAOx3  - currently AAOx3   - CTA H/N - neg CVA  - UTox - neg  - likely 2/2 to septic shock 2/2 to large bowel obstruction.     _________CVS___________    #Septic Shock (resolved)  - Likely 2/2 to large bowel obstruction vs UTI.   - P/w elevated WBC 19, lactate 3.8 and hypotension  - Lactate 3.8>7.5>1.5  - treated for UTI w/ Cipro outpatient for 5 days.   - UA+  - CT abdomen/pelvis: Large left inguinal hernia containing colon. There is dilatation of the colon proximal to the hernia suggestive of large bowel obstruction.  - start Zosyn  - ID (Dr. Bravo) consulted  - D/C levophed and vasopressin.   - f/u UCx, BCx    #Hypertension  - home meds - amlodipine, benazepril  - BP monitoring  - hold benazepril in the setting of AKIN  - hold amlodipine in setting of shock    #New onset Atrial fibrillation w/ RVR  - Likely in setting of sepsis.   - Started on amiodarone and heparin. Now D/C as patient converted to sinus rhythm.   - EKG 10/23/23 - EKG: NSR    _________ RESP__________    #no issues    __________GI____________    #Incarcerated Ventral Hernia  #Large Bowel Obstruction  - CT Abd: Large left inguinal hernia containing colon. There is dilatation of the colon proximal to the hernia suggestive of large bowel obstruction.- NPO for now  - NGT to suction  - Surgery consulted - successful manual reduction     ________ RENAL__________    #Acute Renal Failure  - SCr (baseline) - 0.96  - BUN/Scr - 206/12.60 on admission.   - Cr - 12.6>5.97  - FeNa - 3.2 (intrinsic)  - S/P HD on 10/23  - Now likely in polyuric phase of ATN, Urine output 7L past 24 hours 10/23/23.   - ESR - 27, CRP - 109  - HIV - negative, Hepatitis panel - negative,   - F/U GEMMA, ANCA, C3, SPEP/UPEP  - monitor BMP Q4  - avoid nephrotoxic agents    #Metabolic Acidosis  - Resolved.  - Lactate - 3.8>1..5  - AG 32>19  - s/p 150 mg NaHCO3 x 2  - D/C Bicarb drip  - monitor ABG    #respiratory alkalosis with metabolic alkalosis  -     #Lactic Acidosis  - Lactate 3.8>7.5  - Likely 2/2 to septic shock.   - s/p 2L NS bolus  - monitor lactate q2    #Hyperkalemia  - K - 6.3  - EKG - NSR, TWI on anterior leads  - Trop - neg  - s/p Ca Gluc + hyperkalemia cocktail (insulin/dextrose + lokelma)  - monitor BMP    #BPH  - home meds - tamsulosin, finasteride  - hold home meds while NPO    _________MSK___________    #Elevated CPK  - 466  - continue IVF hydration    __________ID____________    #Septic Shock  - see above    _________ENDO__________    #DM  - home meds - metformin, glipizide  - hold PO meds  - start insulin sliding scale q6 while NPO  - Accu Cheks Q6  - f/u a1c    _______HEME/ONC_______    #no issues    _________SKIN____________    - L Ext dwell 10/22/2023  - R shiley 10/22/2023  - [facility] 10/22/2023  - place arterial line    ________Prophylaxis_______    #DVT - heparin SQ     __________GOC/ DISPO___________    GOC -   DISPO - Admit to ICU Patient is a 79 y/o M, lives alone at home, ambulates w/ a cane. He has PMHx of HTN, DM, BPH. He is a poor historian and collateral Hx was obtained from his daughter, Ms. Rosa Mcneil ([phone]) at bedside. Admitted for acute renal failure    #Acute Renal Failure  #Metabolic Acidosis  #Hyperkalemia  #Septic Shock  #Acute Metabolic Encephalopathy  #Incarcerated Ventral Hernia / Large Bowel Obstruction  #Hypertension  #Type 2 Diabetes Mellitus  #Benign Prostatic Hyperplasia    _________CNS___________    #Acute Metabolic Encephalopathy  - Resolved  - baseline: AAOx3  - currently AAOx3   - CTA H/N - neg CVA  - UTox - neg  - likely 2/2 to septic shock 2/2 to large bowel obstruction.     _________CVS___________    #Septic Shock (resolved)  - Likely 2/2 to large bowel obstruction vs UTI.   - P/w elevated WBC 19, lactate 3.8 and hypotension  - Lactate 3.8>7.5>1.5  - treated for UTI w/ Cipro outpatient for 5 days.   - UA+  - CT abdomen/pelvis: Large left inguinal hernia containing colon. There is dilatation of the colon proximal to the hernia suggestive of large bowel obstruction.  - start Zosyn  - ID (Dr. Bravo) consulted  - D/C levophed and vasopressin.   - f/u UCx, BCx    #Hypertension  - home meds - amlodipine, benazepril  - BP monitoring  - hold benazepril in the setting of AKIN  - hold amlodipine in setting of shock    #New onset Atrial fibrillation w/ RVR  - Likely in setting of sepsis.   - Started on amiodarone and heparin. Now D/C as patient converted to sinus rhythm.   - EKG 10/23/23 - EKG: NSR    _________ RESP__________    #no issues    __________GI____________    #Incarcerated Ventral Hernia  #Large Bowel Obstruction  - CT Abd: Large left inguinal hernia containing colon. There is dilatation of the colon proximal to the hernia suggestive of large bowel obstruction.- NPO for now  - NGT to suction - output 2.2L  - Replete with L  - Surgery consulted - successful manual reduction     ________ RENAL__________    #Acute Renal Failure  - SCr (baseline) - 0.96  - BUN/Scr - 206/12.60 on admission.   - Cr - 12.6>5.97  - FeNa - 3.2 (intrinsic)  - S/P HD on 10/23  - Now likely in polyuric phase of ATN, Urine output 7L past 24 hours 10/23/23.   - ESR - 27, CRP - 109  - HIV - negative, Hepatitis panel - negative,   - F/U GEMMA, ANCA, C3, SPEP/UPEP  - monitor BMP Q4  - avoid nephrotoxic agents    #Metabolic Acidosis  - Resolved.  - in setting of lactic acidosis.   - Lactate - 3.8>1..5  - AG 32>19  - s/p 150 mg NaHCO3 x 2  - D/C Bicarb drip  - monitor ABG    #respiratory alkalosis with metabolic alkalosis  - 7.56/30/86/27  - Likely 2/2 to contraction alkalosis and tachypnea in setting of abdominal pain.     #Hyperkalemia (resolved)  - K - 6.3>3.6  - EKG - NSR, TWI on anterior leads  - Trop - neg  - s/p Ca Gluc + hyperkalemia cocktail (insulin/dextrose + lokelma)  - monitor BMP    #BPH  - home meds - tamsulosin, finasteride  - hold home meds while NPO    _________MSK___________    #Elevated CPK  - 466  - continue IVF hydration    __________ID____________    #Septic Shock  - see above    _________ENDO__________    #DM  - home meds - metformin, glipizide  - hold PO meds  - start insulin sliding scale q6 while NPO  - Accu Cheks Q6  - f/u a1c    _______HEME/ONC_______    #no issues    _________SKIN____________    - L Ext dwell 10/22/2023  - LUCAS sellers 10/22/2023  - JOSEPHINE 10/22/2023  - R. axillary arterial line 10/22/2023    ________Prophylaxis_______    #DVT - heparin SQ     __________GOC/ DISPO___________    GOC - DNR/DNI  DISPO - Admit to ICU Patient is a 79 y/o M, lives alone at home, ambulates w/ a cane. He has PMHx of HTN, DM, BPH. He is a poor historian and collateral Hx was obtained from his daughter, Ms. Rosa Mcneil ([phone]) at bedside. Admitted for acute renal failure    #Acute Renal Failure  #Metabolic Acidosis  #Hyperkalemia  #Septic Shock  #Acute Metabolic Encephalopathy  #Incarcerated Ventral Hernia / Large Bowel Obstruction  #Hypertension  #Type 2 Diabetes Mellitus  #Benign Prostatic Hyperplasia    _________CNS___________    #Acute Metabolic Encephalopathy  - Resolved  - baseline: AAOx3  - currently AAOx3   - CTA H/N - neg CVA  - UTox - neg  - likely 2/2 to septic shock 2/2 to large bowel obstruction.     _________CVS___________    #Septic Shock (resolved)  - Likely 2/2 to large bowel obstruction vs UTI.   - P/w elevated WBC 19, lactate 3.8 and hypotension  - Lactate 3.8>7.5>1.5  - treated for UTI w/ Cipro outpatient for 5 days.   - UA+  - CT abdomen/pelvis: Large left inguinal hernia containing colon. There is dilatation of the colon proximal to the hernia suggestive of large bowel obstruction.  - start Zosyn  - ID (Dr. Bravo) consulted  - D/C levophed and vasopressin.   - f/u UCx, BCx    #Hypertension  - home meds - amlodipine, benazepril  - BP monitoring  - hold benazepril in the setting of AKIN  - hold amlodipine in setting of shock    #New onset Atrial fibrillation w/ RVR  - Likely in setting of sepsis.   - Started on amiodarone and heparin. Now D/C as patient converted to sinus rhythm.   - EKG 10/23/23 - EKG: NSR    _________ RESP__________    #no issues    __________GI____________    #Incarcerated Ventral Hernia  #Large Bowel Obstruction  - CT Abd: Large left inguinal hernia containing colon. There is dilatation of the colon proximal to the hernia suggestive of large bowel obstruction.- NPO for now  - NGT to suction - output 2.2L  - Surgery consulted - successful manual reduction     ________ RENAL__________    #Acute Renal Failure  - SCr (baseline) - 0.96  - BUN/Scr - 206/12.60 on admission.   - Cr - 12.6>5.97  - FeNa - 3.2 (intrinsic)  - S/P HD on 10/23  - Now likely in polyuric phase of ATN, Urine output 7L past 24 hours 10/23/23.   - ESR - 27, CRP - 109  - HIV - negative, Hepatitis panel - negative,   - F/U GEMMA, ANCA, C3, SPEP/UPEP  - monitor BMP Q4  - Start standing LR @100 to replete volume loss from renal vs GI.   - avoid nephrotoxic agents    #Metabolic Acidosis  - Resolved.  - in setting of lactic acidosis.   - Lactate - 3.8>1..5  - AG 32>19  - s/p 150 mg NaHCO3 x 2  - D/C Bicarb drip  - monitor ABG    #respiratory alkalosis with metabolic alkalosis  - 7.56/30/86/27  - Likely 2/2 to contraction alkalosis and tachypnea in setting of abdominal pain.     #Hyperkalemia (resolved)  - K - 6.3>3.6  - EKG - NSR, TWI on anterior leads  - Trop - neg  - s/p Ca Gluc + hyperkalemia cocktail (insulin/dextrose + lokelma)  - monitor BMP    #BPH  - home meds - tamsulosin, finasteride  - hold home meds while NPO    _________MSK___________    #Elevated CPK  - 466  - continue IVF hydration    __________ID____________    #Septic Shock  - see above    _________ENDO__________    #DM  - home meds - metformin, glipizide  - hold PO meds  - start insulin sliding scale q6 while NPO  - Accu Cheks Q6  - f/u a1c    _______HEME/ONC_______    #no issues    _________SKIN____________    - L Ext dwell 10/22/2023  - R shiley 10/22/2023  - [facility]J 10/22/2023  - R. axillary arterial line 10/22/2023    ________Prophylaxis_______    #DVT - heparin SQ     __________GOC/ DISPO___________    GOC - DNR/DNI  DISPO - Admit to ICU

## 2023-10-23 NOTE — CHART NOTE - NSCHARTNOTEFT_GEN_A_CORE
Patient was noted to start having chichi hematuria. He has 14 mm R hypodense lesion on CT Abdomen. Heparin SQ was discontinued. CBC, PT/PTT was ordered. BMP, Mg, Phos was also sent due to increased urine output. Renal/Bladder US ordered.

## 2023-10-23 NOTE — PROGRESS NOTE ADULT - ASSESSMENT
AKIN:  Possible ATN, the risk being sepsis and possible shock.  He was dialyzed last night. He is voiding well and is stable.  Not severely volume overloaded and is not hypotensive.  - Hold HD for now, monitoring continued signs of improvement.  - Pressor support, targeting MAP > 65.  - Monitor BMP.        Metabolic Acidosis:  High AG Metabolic Acidosis, likely Lactic Acidosis form septic shock. Liver function is still normal.  This has resolved post HD.  Osmolar Gap is < 30.  - Follow up BMP.    Hyperkalemia:  Improved.    Incarcerated Hernia:  - Surgery follow up

## 2023-10-24 LAB
ANION GAP SERPL CALC-SCNC: 3 MMOL/L — LOW (ref 5–17)
ANION GAP SERPL CALC-SCNC: 3 MMOL/L — LOW (ref 5–17)
ANION GAP SERPL CALC-SCNC: 4 MMOL/L — LOW (ref 5–17)
ANION GAP SERPL CALC-SCNC: 6 MMOL/L — SIGNIFICANT CHANGE UP (ref 5–17)
AUTO DIFF PNL BLD: NEGATIVE — SIGNIFICANT CHANGE UP
AUTO DIFF PNL BLD: NEGATIVE — SIGNIFICANT CHANGE UP
B2 GLYCOPROT1 AB SER QL: NEGATIVE — SIGNIFICANT CHANGE UP
B2 GLYCOPROT1 AB SER QL: NEGATIVE — SIGNIFICANT CHANGE UP
BASE EXCESS BLDA CALC-SCNC: 17.2 MMOL/L — HIGH (ref -2–3)
BASE EXCESS BLDA CALC-SCNC: 17.2 MMOL/L — HIGH (ref -2–3)
BASE EXCESS BLDV CALC-SCNC: 9.4 MMOL/L — SIGNIFICANT CHANGE UP
BASE EXCESS BLDV CALC-SCNC: 9.4 MMOL/L — SIGNIFICANT CHANGE UP
BLOOD GAS COMMENTS ARTERIAL: SIGNIFICANT CHANGE UP
BLOOD GAS COMMENTS ARTERIAL: SIGNIFICANT CHANGE UP
BUN SERPL-MCNC: 54 MG/DL — HIGH (ref 7–18)
BUN SERPL-MCNC: 54 MG/DL — HIGH (ref 7–18)
BUN SERPL-MCNC: 55 MG/DL — HIGH (ref 7–18)
BUN SERPL-MCNC: 55 MG/DL — HIGH (ref 7–18)
BUN SERPL-MCNC: 57 MG/DL — HIGH (ref 7–18)
BUN SERPL-MCNC: 57 MG/DL — HIGH (ref 7–18)
BUN SERPL-MCNC: 64 MG/DL — HIGH (ref 7–18)
BUN SERPL-MCNC: 64 MG/DL — HIGH (ref 7–18)
BUN SERPL-MCNC: 86 MG/DL — HIGH (ref 7–18)
BUN SERPL-MCNC: 86 MG/DL — HIGH (ref 7–18)
C-ANCA SER-ACNC: NEGATIVE — SIGNIFICANT CHANGE UP
C-ANCA SER-ACNC: NEGATIVE — SIGNIFICANT CHANGE UP
CA-I SERPL-SCNC: SIGNIFICANT CHANGE UP MMOL/L (ref 1.15–1.33)
CA-I SERPL-SCNC: SIGNIFICANT CHANGE UP MMOL/L (ref 1.15–1.33)
CALCIUM SERPL-MCNC: 8.2 MG/DL — LOW (ref 8.4–10.5)
CALCIUM SERPL-MCNC: 8.3 MG/DL — LOW (ref 8.4–10.5)
CALCIUM SERPL-MCNC: 8.3 MG/DL — LOW (ref 8.4–10.5)
CALCIUM SERPL-MCNC: 9.1 MG/DL — SIGNIFICANT CHANGE UP (ref 8.4–10.5)
CALCIUM SERPL-MCNC: 9.1 MG/DL — SIGNIFICANT CHANGE UP (ref 8.4–10.5)
CARDIOLIPIN AB SER-ACNC: NEGATIVE — SIGNIFICANT CHANGE UP
CARDIOLIPIN AB SER-ACNC: NEGATIVE — SIGNIFICANT CHANGE UP
CHLORIDE SERPL-SCNC: 117 MMOL/L — HIGH (ref 96–108)
CHLORIDE SERPL-SCNC: 117 MMOL/L — HIGH (ref 96–108)
CHLORIDE SERPL-SCNC: 118 MMOL/L — HIGH (ref 96–108)
CHLORIDE SERPL-SCNC: 118 MMOL/L — HIGH (ref 96–108)
CHLORIDE SERPL-SCNC: 119 MMOL/L — HIGH (ref 96–108)
CHLORIDE SERPL-SCNC: 120 MMOL/L — HIGH (ref 96–108)
CHLORIDE SERPL-SCNC: 120 MMOL/L — HIGH (ref 96–108)
CO2 SERPL-SCNC: 33 MMOL/L — HIGH (ref 22–31)
CO2 SERPL-SCNC: 33 MMOL/L — HIGH (ref 22–31)
CO2 SERPL-SCNC: 34 MMOL/L — HIGH (ref 22–31)
CO2 SERPL-SCNC: 34 MMOL/L — HIGH (ref 22–31)
CO2 SERPL-SCNC: 36 MMOL/L — HIGH (ref 22–31)
CO2 SERPL-SCNC: 36 MMOL/L — HIGH (ref 22–31)
CO2 SERPL-SCNC: 37 MMOL/L — HIGH (ref 22–31)
CO2 SERPL-SCNC: 37 MMOL/L — HIGH (ref 22–31)
CO2 SERPL-SCNC: 38 MMOL/L — HIGH (ref 22–31)
CO2 SERPL-SCNC: 38 MMOL/L — HIGH (ref 22–31)
CREAT SERPL-MCNC: 2.21 MG/DL — HIGH (ref 0.5–1.3)
CREAT SERPL-MCNC: 2.21 MG/DL — HIGH (ref 0.5–1.3)
CREAT SERPL-MCNC: 2.22 MG/DL — HIGH (ref 0.5–1.3)
CREAT SERPL-MCNC: 2.43 MG/DL — HIGH (ref 0.5–1.3)
CREAT SERPL-MCNC: 2.43 MG/DL — HIGH (ref 0.5–1.3)
CREAT SERPL-MCNC: 3.17 MG/DL — HIGH (ref 0.5–1.3)
CREAT SERPL-MCNC: 3.17 MG/DL — HIGH (ref 0.5–1.3)
CULTURE RESULTS: NO GROWTH — SIGNIFICANT CHANGE UP
CULTURE RESULTS: NO GROWTH — SIGNIFICANT CHANGE UP
EGFR: 19 ML/MIN/1.73M2 — LOW
EGFR: 19 ML/MIN/1.73M2 — LOW
EGFR: 27 ML/MIN/1.73M2 — LOW
EGFR: 27 ML/MIN/1.73M2 — LOW
EGFR: 30 ML/MIN/1.73M2 — LOW
GAS PNL BLDV: 145 MMOL/L — SIGNIFICANT CHANGE UP (ref 136–145)
GAS PNL BLDV: 145 MMOL/L — SIGNIFICANT CHANGE UP (ref 136–145)
GAS PNL BLDV: SIGNIFICANT CHANGE UP
GAS PNL BLDV: SIGNIFICANT CHANGE UP
GLUCOSE SERPL-MCNC: 206 MG/DL — HIGH (ref 70–99)
GLUCOSE SERPL-MCNC: 206 MG/DL — HIGH (ref 70–99)
GLUCOSE SERPL-MCNC: 232 MG/DL — HIGH (ref 70–99)
GLUCOSE SERPL-MCNC: 232 MG/DL — HIGH (ref 70–99)
GLUCOSE SERPL-MCNC: 267 MG/DL — HIGH (ref 70–99)
GLUCOSE SERPL-MCNC: 267 MG/DL — HIGH (ref 70–99)
GLUCOSE SERPL-MCNC: 281 MG/DL — HIGH (ref 70–99)
GLUCOSE SERPL-MCNC: 281 MG/DL — HIGH (ref 70–99)
GLUCOSE SERPL-MCNC: 339 MG/DL — HIGH (ref 70–99)
GLUCOSE SERPL-MCNC: 339 MG/DL — HIGH (ref 70–99)
HCO3 BLDA-SCNC: 40 MMOL/L — HIGH (ref 21–28)
HCO3 BLDA-SCNC: 40 MMOL/L — HIGH (ref 21–28)
HCO3 BLDV-SCNC: 35 MMOL/L — HIGH (ref 22–29)
HCO3 BLDV-SCNC: 35 MMOL/L — HIGH (ref 22–29)
HCT VFR BLD CALC: 28.5 % — LOW (ref 39–50)
HCT VFR BLD CALC: 28.5 % — LOW (ref 39–50)
HGB BLD-MCNC: 9.7 G/DL — LOW (ref 13–17)
HGB BLD-MCNC: 9.7 G/DL — LOW (ref 13–17)
HOROWITZ INDEX BLDA+IHG-RTO: 21 — SIGNIFICANT CHANGE UP
HOROWITZ INDEX BLDA+IHG-RTO: 21 — SIGNIFICANT CHANGE UP
HOROWITZ INDEX BLDV+IHG-RTO: 21 — SIGNIFICANT CHANGE UP
HOROWITZ INDEX BLDV+IHG-RTO: 21 — SIGNIFICANT CHANGE UP
LACTATE BLDV-MCNC: 1.3 MMOL/L — SIGNIFICANT CHANGE UP (ref 0.5–2)
LACTATE BLDV-MCNC: 1.3 MMOL/L — SIGNIFICANT CHANGE UP (ref 0.5–2)
MAGNESIUM SERPL-MCNC: 1.9 MG/DL — SIGNIFICANT CHANGE UP (ref 1.6–2.6)
MAGNESIUM SERPL-MCNC: 1.9 MG/DL — SIGNIFICANT CHANGE UP (ref 1.6–2.6)
MAGNESIUM SERPL-MCNC: 2.1 MG/DL — SIGNIFICANT CHANGE UP (ref 1.6–2.6)
MAGNESIUM SERPL-MCNC: 2.1 MG/DL — SIGNIFICANT CHANGE UP (ref 1.6–2.6)
MAGNESIUM SERPL-MCNC: 2.2 MG/DL — SIGNIFICANT CHANGE UP (ref 1.6–2.6)
MCHC RBC-ENTMCNC: 28.6 PG — SIGNIFICANT CHANGE UP (ref 27–34)
MCHC RBC-ENTMCNC: 28.6 PG — SIGNIFICANT CHANGE UP (ref 27–34)
MCHC RBC-ENTMCNC: 34 GM/DL — SIGNIFICANT CHANGE UP (ref 32–36)
MCHC RBC-ENTMCNC: 34 GM/DL — SIGNIFICANT CHANGE UP (ref 32–36)
MCV RBC AUTO: 84.1 FL — SIGNIFICANT CHANGE UP (ref 80–100)
MCV RBC AUTO: 84.1 FL — SIGNIFICANT CHANGE UP (ref 80–100)
MPO AB + PR3 PNL SER: SIGNIFICANT CHANGE UP
MPO AB + PR3 PNL SER: SIGNIFICANT CHANGE UP
NRBC # BLD: 0 /100 WBCS — SIGNIFICANT CHANGE UP (ref 0–0)
NRBC # BLD: 0 /100 WBCS — SIGNIFICANT CHANGE UP (ref 0–0)
P-ANCA SER-ACNC: NEGATIVE — SIGNIFICANT CHANGE UP
P-ANCA SER-ACNC: NEGATIVE — SIGNIFICANT CHANGE UP
PCO2 BLDA: 39 MMHG — SIGNIFICANT CHANGE UP (ref 35–48)
PCO2 BLDA: 39 MMHG — SIGNIFICANT CHANGE UP (ref 35–48)
PCO2 BLDV: 49 MMHG — SIGNIFICANT CHANGE UP (ref 42–55)
PCO2 BLDV: 49 MMHG — SIGNIFICANT CHANGE UP (ref 42–55)
PH BLDA: 7.62 — CRITICAL HIGH (ref 7.35–7.45)
PH BLDA: 7.62 — CRITICAL HIGH (ref 7.35–7.45)
PH BLDV: 7.46 — HIGH (ref 7.32–7.43)
PH BLDV: 7.46 — HIGH (ref 7.32–7.43)
PHOSPHATE SERPL-MCNC: 2.4 MG/DL — LOW (ref 2.5–4.5)
PHOSPHATE SERPL-MCNC: 2.4 MG/DL — LOW (ref 2.5–4.5)
PHOSPHATE SERPL-MCNC: 2.6 MG/DL — SIGNIFICANT CHANGE UP (ref 2.5–4.5)
PHOSPHATE SERPL-MCNC: 2.6 MG/DL — SIGNIFICANT CHANGE UP (ref 2.5–4.5)
PHOSPHATE SERPL-MCNC: 2.7 MG/DL — SIGNIFICANT CHANGE UP (ref 2.5–4.5)
PHOSPHATE SERPL-MCNC: 2.7 MG/DL — SIGNIFICANT CHANGE UP (ref 2.5–4.5)
PHOSPHATE SERPL-MCNC: 4 MG/DL — SIGNIFICANT CHANGE UP (ref 2.5–4.5)
PHOSPHATE SERPL-MCNC: 4 MG/DL — SIGNIFICANT CHANGE UP (ref 2.5–4.5)
PLATELET # BLD AUTO: 319 K/UL — SIGNIFICANT CHANGE UP (ref 150–400)
PLATELET # BLD AUTO: 319 K/UL — SIGNIFICANT CHANGE UP (ref 150–400)
PO2 BLDA: 78 MMHG — LOW (ref 83–108)
PO2 BLDA: 78 MMHG — LOW (ref 83–108)
PO2 BLDV: 36 MMHG — SIGNIFICANT CHANGE UP
PO2 BLDV: 36 MMHG — SIGNIFICANT CHANGE UP
POTASSIUM BLDV-SCNC: 4.1 MMOL/L — SIGNIFICANT CHANGE UP (ref 3.5–5.1)
POTASSIUM BLDV-SCNC: 4.1 MMOL/L — SIGNIFICANT CHANGE UP (ref 3.5–5.1)
POTASSIUM SERPL-MCNC: 2.9 MMOL/L — CRITICAL LOW (ref 3.5–5.3)
POTASSIUM SERPL-MCNC: 2.9 MMOL/L — CRITICAL LOW (ref 3.5–5.3)
POTASSIUM SERPL-MCNC: 3.1 MMOL/L — LOW (ref 3.5–5.3)
POTASSIUM SERPL-MCNC: 3.1 MMOL/L — LOW (ref 3.5–5.3)
POTASSIUM SERPL-MCNC: 3.4 MMOL/L — LOW (ref 3.5–5.3)
POTASSIUM SERPL-SCNC: 2.9 MMOL/L — CRITICAL LOW (ref 3.5–5.3)
POTASSIUM SERPL-SCNC: 2.9 MMOL/L — CRITICAL LOW (ref 3.5–5.3)
POTASSIUM SERPL-SCNC: 3.1 MMOL/L — LOW (ref 3.5–5.3)
POTASSIUM SERPL-SCNC: 3.1 MMOL/L — LOW (ref 3.5–5.3)
POTASSIUM SERPL-SCNC: 3.4 MMOL/L — LOW (ref 3.5–5.3)
RBC # BLD: 3.39 M/UL — LOW (ref 4.2–5.8)
RBC # BLD: 3.39 M/UL — LOW (ref 4.2–5.8)
RBC # FLD: 14.2 % — SIGNIFICANT CHANGE UP (ref 10.3–14.5)
RBC # FLD: 14.2 % — SIGNIFICANT CHANGE UP (ref 10.3–14.5)
SAO2 % BLDA: 98 % — SIGNIFICANT CHANGE UP
SAO2 % BLDA: 98 % — SIGNIFICANT CHANGE UP
SAO2 % BLDV: 59.3 % — SIGNIFICANT CHANGE UP
SAO2 % BLDV: 59.3 % — SIGNIFICANT CHANGE UP
SODIUM SERPL-SCNC: 157 MMOL/L — HIGH (ref 135–145)
SODIUM SERPL-SCNC: 157 MMOL/L — HIGH (ref 135–145)
SODIUM SERPL-SCNC: 158 MMOL/L — HIGH (ref 135–145)
SODIUM SERPL-SCNC: 158 MMOL/L — HIGH (ref 135–145)
SODIUM SERPL-SCNC: 159 MMOL/L — HIGH (ref 135–145)
SODIUM SERPL-SCNC: 159 MMOL/L — HIGH (ref 135–145)
SODIUM SERPL-SCNC: 160 MMOL/L — CRITICAL HIGH (ref 135–145)
SPECIMEN SOURCE: SIGNIFICANT CHANGE UP
SPECIMEN SOURCE: SIGNIFICANT CHANGE UP
WBC # BLD: 16.31 K/UL — HIGH (ref 3.8–10.5)
WBC # BLD: 16.31 K/UL — HIGH (ref 3.8–10.5)
WBC # FLD AUTO: 16.31 K/UL — HIGH (ref 3.8–10.5)
WBC # FLD AUTO: 16.31 K/UL — HIGH (ref 3.8–10.5)

## 2023-10-24 PROCEDURE — 99231 SBSQ HOSP IP/OBS SF/LOW 25: CPT

## 2023-10-24 PROCEDURE — 99291 CRITICAL CARE FIRST HOUR: CPT

## 2023-10-24 PROCEDURE — 76775 US EXAM ABDO BACK WALL LIM: CPT | Mod: 26

## 2023-10-24 PROCEDURE — 74018 RADEX ABDOMEN 1 VIEW: CPT | Mod: 26

## 2023-10-24 RX ORDER — LABETALOL HCL 100 MG
10 TABLET ORAL ONCE
Refills: 0 | Status: COMPLETED | OUTPATIENT
Start: 2023-10-24 | End: 2023-10-24

## 2023-10-24 RX ORDER — SODIUM CHLORIDE 9 MG/ML
1000 INJECTION, SOLUTION INTRAVENOUS
Refills: 0 | Status: DISCONTINUED | OUTPATIENT
Start: 2023-10-24 | End: 2023-10-24

## 2023-10-24 RX ORDER — POTASSIUM CHLORIDE 20 MEQ
20 PACKET (EA) ORAL ONCE
Refills: 0 | Status: COMPLETED | OUTPATIENT
Start: 2023-10-24 | End: 2023-10-24

## 2023-10-24 RX ORDER — POTASSIUM CHLORIDE 20 MEQ
10 PACKET (EA) ORAL
Refills: 0 | Status: COMPLETED | OUTPATIENT
Start: 2023-10-24 | End: 2023-10-24

## 2023-10-24 RX ORDER — POTASSIUM PHOSPHATE, MONOBASIC POTASSIUM PHOSPHATE, DIBASIC 236; 224 MG/ML; MG/ML
15 INJECTION, SOLUTION INTRAVENOUS ONCE
Refills: 0 | Status: COMPLETED | OUTPATIENT
Start: 2023-10-24 | End: 2023-10-25

## 2023-10-24 RX ORDER — SODIUM CHLORIDE 9 MG/ML
1000 INJECTION, SOLUTION INTRAVENOUS
Refills: 0 | Status: DISCONTINUED | OUTPATIENT
Start: 2023-10-24 | End: 2023-10-28

## 2023-10-24 RX ORDER — POTASSIUM PHOSPHATE, MONOBASIC POTASSIUM PHOSPHATE, DIBASIC 236; 224 MG/ML; MG/ML
15 INJECTION, SOLUTION INTRAVENOUS ONCE
Refills: 0 | Status: DISCONTINUED | OUTPATIENT
Start: 2023-10-24 | End: 2023-10-24

## 2023-10-24 RX ORDER — POTASSIUM CHLORIDE 20 MEQ
40 PACKET (EA) ORAL ONCE
Refills: 0 | Status: COMPLETED | OUTPATIENT
Start: 2023-10-24 | End: 2023-10-24

## 2023-10-24 RX ORDER — TRAMADOL HYDROCHLORIDE 50 MG/1
25 TABLET ORAL ONCE
Refills: 0 | Status: DISCONTINUED | OUTPATIENT
Start: 2023-10-24 | End: 2023-10-24

## 2023-10-24 RX ORDER — PHENOBARBITAL 60 MG
130 TABLET ORAL
Refills: 0 | Status: DISCONTINUED | OUTPATIENT
Start: 2023-10-24 | End: 2023-10-24

## 2023-10-24 RX ADMIN — Medication 100 MILLIEQUIVALENT(S): at 22:59

## 2023-10-24 RX ADMIN — Medication 100 MILLIEQUIVALENT(S): at 07:02

## 2023-10-24 RX ADMIN — SODIUM CHLORIDE 200 MILLILITER(S): 9 INJECTION, SOLUTION INTRAVENOUS at 16:20

## 2023-10-24 RX ADMIN — Medication 50 MILLIEQUIVALENT(S): at 01:05

## 2023-10-24 RX ADMIN — Medication 10 MILLIGRAM(S): at 11:39

## 2023-10-24 RX ADMIN — SODIUM CHLORIDE 200 MILLILITER(S): 9 INJECTION, SOLUTION INTRAVENOUS at 11:37

## 2023-10-24 RX ADMIN — Medication 2: at 07:07

## 2023-10-24 RX ADMIN — Medication 100 MILLIEQUIVALENT(S): at 21:42

## 2023-10-24 RX ADMIN — Medication 4: at 18:02

## 2023-10-24 RX ADMIN — PIPERACILLIN AND TAZOBACTAM 25 GRAM(S): 4; .5 INJECTION, POWDER, LYOPHILIZED, FOR SOLUTION INTRAVENOUS at 11:41

## 2023-10-24 RX ADMIN — Medication 40 MILLIEQUIVALENT(S): at 21:42

## 2023-10-24 RX ADMIN — PIPERACILLIN AND TAZOBACTAM 25 GRAM(S): 4; .5 INJECTION, POWDER, LYOPHILIZED, FOR SOLUTION INTRAVENOUS at 23:29

## 2023-10-24 RX ADMIN — CHLORHEXIDINE GLUCONATE 1 APPLICATION(S): 213 SOLUTION TOPICAL at 07:02

## 2023-10-24 RX ADMIN — Medication 1: at 11:40

## 2023-10-24 NOTE — PHYSICAL THERAPY INITIAL EVALUATION ADULT - DIAGNOSIS, PT EVAL
(ICF Model) Pt. present w/deficits in Body Structures/Function (Impairments), incl: Strength, Balance, sensation, leading to deficits in performing the below noted Activities (Limitations).

## 2023-10-24 NOTE — PROGRESS NOTE ADULT - SUBJECTIVE AND OBJECTIVE BOX
INTERVAL HPI/OVERNIGHT EVENTS:    Pt seen and examined at bedside this morning. Pt is feeling well, reports being hungry and wishes to eat.   Denies N/V/F/C or any acute complaints.     Vital Signs Last 24 Hrs  T(C): 36.4 (24 Oct 2023 17:33), Max: 36.4 (23 Oct 2023 20:02)  T(F): 97.6 (24 Oct 2023 17:33), Max: 97.6 (23 Oct 2023 20:02)  HR: 92 (24 Oct 2023 17:33) (80 - 102)  BP: 106/79 (24 Oct 2023 17:33) (106/79 - 178/78)  BP(mean): 99 (24 Oct 2023 16:00) (74 - 108)  RR: 18 (24 Oct 2023 17:33) (10 - 27)  SpO2: 95% (24 Oct 2023 17:33) (91% - 100%)    Parameters below as of 24 Oct 2023 17:33  Patient On (Oxygen Delivery Method): room air      I&O's Detail    23 Oct 2023 07:01  -  24 Oct 2023 07:00  --------------------------------------------------------  IN:    Amiodarone: 50.1 mL    IV PiggyBack: 200 mL    IV PiggyBack: 300 mL    Lactated Ringers: 3000 mL  Total IN: 3550.1 mL    OUT:    Indwelling Catheter - Urethral (mL): 4400 mL    Nasogastric/Oral tube (mL): 1200 mL  Total OUT: 5600 mL    Total NET: -2049.9 mL      24 Oct 2023 07:01  -  24 Oct 2023 18:47  --------------------------------------------------------  IN:    dextrose 5%: 800 mL    dextrose 5% + lactated ringers: 800 mL    IV PiggyBack: 100 mL    Lactated Ringers: 600 mL  Total IN: 2300 mL    OUT:    Indwelling Catheter - Urethral (mL): 1650 mL    Nasogastric/Oral tube (mL): 700 mL  Total OUT: 2350 mL    Total NET: -50 mL        piperacillin/tazobactam IVPB.. 3.375 Gram(s) IV Intermittent every 12 hours      Physical Exam  General: AAOx3, No acute distress, NGT to LWS   Skin: No jaundice, no icterus  Abdomen: Soft nondistended, nontender.  Pelvis: L inguinoscrotal hernia soft, nontender. No skin discoloration.     Drains/Tubes:     Labs:                        9.7    16.31 )-----------( 319      ( 24 Oct 2023 03:14 )             28.5     10-24    158<H>  |  118<H>  |  57<H>  ----------------------------<  339<H>  3.4<L>   |  36<H>  |  2.22<H>    Ca    8.3<L>      24 Oct 2023 16:33  Phos  2.7     10-24  Mg     1.9     10-24    TPro  5.6<L>  /  Alb  2.5<L>  /  TBili  0.5  /  DBili  x   /  AST  44<H>  /  ALT  47  /  AlkPhos  81  10-23    PT/INR - ( 23 Oct 2023 18:10 )   PT: 15.3 sec;   INR: 1.35 ratio         PTT - ( 23 Oct 2023 18:10 )  PTT:30.7 sec    RADIOLOGY & ADDITIONAL STUDIES:    78yMale

## 2023-10-24 NOTE — PHYSICAL THERAPY INITIAL EVALUATION ADULT - BALANCE DISTURBANCE, IDENTIFIED IMPAIRMENT CONTRIBUTE, REHAB EVAL
abnormal muscle tone/impaired postural control/decreased ROM/decreased sensation/impaired sensory feedback/decreased strength

## 2023-10-24 NOTE — PROGRESS NOTE ADULT - ATTENDING COMMENTS
Patient is a 79 y/o M, lives alone at home, ambulates w/ a cane. He has PMHx of HTN, DM, BPH. He is a poor historian and collateral Hx was obtained from his daughter, Ms. Rosa Mcneil ([phone]) at bedside. Admitted for acute renal failure    #Acute Renal Failure  #Metabolic Acidosis  #Hyperkalemia  #Septic Shock  #Acute Metabolic Encephalopathy  #Incarcerated Ventral Hernia / Large Bowel Obstruction - reduced manually at bedside  #Hypertension  #Type 2 Diabetes Mellitus  #Benign Prostatic Hyperplasia    Plan:    - Rangel to monitor urine output  - AKIN improving, holding HD today  - Surgery consult f/u  - NGT for gastric decompression  - NPO for now  - Empiric antibiotics  - Obtain and f/u cultures  - Close hemodynamic monitoring  - off vasopressors  - Glucose monitoring while NPO, sliding scale insulin coverage for hyperglycemia  - Discussed with daughter at bedside
Patient is a 77 y/o M, lives alone at home, ambulates w/ a cane. He has PMHx of HTN, DM, BPH. He is a poor historian and collateral Hx was obtained from his daughter, Ms. Rosa Mcneil ([phone]) at bedside. Admitted for acute renal failure    #Acute Renal Failure  #Metabolic Acidosis  #Hyperkalemia  #Septic Shock  #Acute Metabolic Encephalopathy  #Incarcerated Ventral Hernia / Large Bowel Obstruction - reduced manually at bedside  #Hypertension  #Type 2 Diabetes Mellitus  #Benign Prostatic Hyperplasia    Plan:    - Rangel to monitor urine output  - AKIN improving, holding HD today  - Surgery consult  - NGT for gastric decompression  - NPO for now  - Empiric antibiotics  - Obtain and f/u cultures  - Close hemodynamic monitoring  - off vasopressors  - Glucose monitoring while NPO, sliding scale insulin coverage for hyperglycemia  - Discussed with daughter at bedside understands critical condition.

## 2023-10-24 NOTE — PROGRESS NOTE ADULT - SUBJECTIVE AND OBJECTIVE BOX
INTERVAL HPI/OVERNIGHT EVENTS: ***    PRESSORS: [ ] YES [ ] NO  WHICH:    ANTIBIOTICS:                      Antimicrobial:  piperacillin/tazobactam IVPB.. 3.375 Gram(s) IV Intermittent every 12 hours    Cardiovascular:    Pulmonary:    Hematalogic:    Other:  chlorhexidine 2% Cloths 1 Application(s) Topical <User Schedule>  insulin lispro (ADMELOG) corrective regimen sliding scale   SubCutaneous every 6 hours  lactated ringers. 1000 milliLiter(s) IV Continuous <Continuous>  sodium chloride 0.9% Bolus. 100 milliLiter(s) IV Bolus every 5 minutes PRN  sodium chloride 0.9% lock flush 10 milliLiter(s) IV Push every 1 hour PRN    chlorhexidine 2% Cloths 1 Application(s) Topical <User Schedule>  insulin lispro (ADMELOG) corrective regimen sliding scale   SubCutaneous every 6 hours  lactated ringers. 1000 milliLiter(s) IV Continuous <Continuous>  piperacillin/tazobactam IVPB.. 3.375 Gram(s) IV Intermittent every 12 hours  sodium chloride 0.9% Bolus. 100 milliLiter(s) IV Bolus every 5 minutes PRN  sodium chloride 0.9% lock flush 10 milliLiter(s) IV Push every 1 hour PRN    Drug Dosing Weight  Height (cm): 180.3 (22 Oct 2023 13:59)  Weight (kg): 81.3 (22 Oct 2023 13:59)  BMI (kg/m2): 25 (22 Oct 2023 13:59)  BSA (m2): 2.01 (22 Oct 2023 13:59)    CENTRAL LINE: [ ] YES [ ] NO  LOCATION:   DATE INSERTED:  REMOVE: [ ] YES [ ] NO  EXPLAIN:    ODONNELL: [ ] YES [ ] NO    DATE INSERTED:  REMOVE:  [ ] YES [ ] NO  EXPLAIN:    A-LINE:  [ ] YES [ ] NO  LOCATION:   DATE INSERTED:  REMOVE:  [ ] YES [ ] NO  EXPLAIN:    PMH -reviewed admission note, no change since admission    ICU Vital Signs Last 24 Hrs  T(C): 36.3 (23 Oct 2023 23:25), Max: 36.5 (23 Oct 2023 12:00)  T(F): 97.3 (23 Oct 2023 23:25), Max: 97.7 (23 Oct 2023 12:00)  HR: 83 (24 Oct 2023 06:00) (66 - 97)  BP: 154/60 (24 Oct 2023 06:00) (121/55 - 178/73)  BP(mean): 87 (24 Oct 2023 06:00) (70 - 106)  ABP: 181/68 (23 Oct 2023 21:00) (100/37 - 181/68)  ABP(mean): 111 (23 Oct 2023 21:00) (58 - 111)  RR: 13 (24 Oct 2023 06:00) (10 - 27)  SpO2: 99% (24 Oct 2023 06:00) (91% - 100%)    O2 Parameters below as of 24 Oct 2023 04:00  Patient On (Oxygen Delivery Method): room air            ABG - ( 23 Oct 2023 08:18 )  pH, Arterial: 7.56  pH, Blood: x     /  pCO2: 30    /  pO2: 86    / HCO3: 27    / Base Excess: 5.2   /  SaO2: 99                    10-23 @ 07:01  -  10-24 @ 07:00  --------------------------------------------------------  IN: 3250.1 mL / OUT: 4600 mL / NET: -1349.9 mL            PHYSICAL EXAM:    GENERAL: NAD, well-groomed, well-developed  HEAD:  Atraumatic, Normocephalic  EYES: EOMI, PERRLA, conjunctiva and sclera clear  ENMT: No tonsillar erythema, exudates, or enlargement; Moist mucous membranes, Good dentition, No lesions  NECK: Supple, normal appearance, No JVD; Normal thyroid; Trachea midline  NERVOUS SYSTEM:  Alert & Oriented X3, Good concentration; Motor Strength 5/5 B/L upper and lower extremities; DTRs 2+ intact and symmetric  CHEST/LUNG: No chest deformity; Normal percussion bilaterally; No rales, rhonchi, wheezing   HEART: Regular rate and rhythm; No murmurs, rubs, or gallops  ABDOMEN: Soft, Nontender, Nondistended; Bowel sounds present  EXTREMITIES:  2+ Peripheral Pulses, No clubbing, cyanosis, or edema  LYMPH: No lymphadenopathy noted  SKIN: No rashes or lesions; Good capillary refill      LABS:  CBC Full  -  ( 24 Oct 2023 03:14 )  WBC Count : 16.31 K/uL  RBC Count : 3.39 M/uL  Hemoglobin : 9.7 g/dL  Hematocrit : 28.5 %  Platelet Count - Automated : 319 K/uL  Mean Cell Volume : 84.1 fl  Mean Cell Hemoglobin : 28.6 pg  Mean Cell Hemoglobin Concentration : 34.0 gm/dL  Auto Neutrophil # : x  Auto Lymphocyte # : x  Auto Monocyte # : x  Auto Eosinophil # : x  Auto Basophil # : x  Auto Neutrophil % : x  Auto Lymphocyte % : x  Auto Monocyte % : x  Auto Eosinophil % : x  Auto Basophil % : x    10-24    157<H>  |  117<H>  |  86<H>  ----------------------------<  232<H>  3.4<L>   |  34<H>  |  3.17<H>    Ca    8.2<L>      24 Oct 2023 03:14  Phos  4.0     10-24  Mg     2.2     10-24    TPro  5.6<L>  /  Alb  2.5<L>  /  TBili  0.5  /  DBili  x   /  AST  44<H>  /  ALT  47  /  AlkPhos  81  10-23    PT/INR - ( 23 Oct 2023 18:10 )   PT: 15.3 sec;   INR: 1.35 ratio         PTT - ( 23 Oct 2023 18:10 )  PTT:30.7 sec  Urinalysis Basic - ( 24 Oct 2023 03:14 )    Color: x / Appearance: x / SG: x / pH: x  Gluc: 232 mg/dL / Ketone: x  / Bili: x / Urobili: x   Blood: x / Protein: x / Nitrite: x   Leuk Esterase: x / RBC: x / WBC x   Sq Epi: x / Non Sq Epi: x / Bacteria: x      Culture Results:   No growth at 24 hours (10-22 @ 14:20)  Culture Results:   No growth at 24 hours (10-22 @ 14:10)      RADIOLOGY & ADDITIONAL STUDIES REVIEWED:  ***    GOALS OF CARE DISCUSSION WITH PATIENT/FAMILY/PROXY:    CRITICAL CARE TIME SPENT: 35 minutes INTERVAL HPI/OVERNIGHT EVENTS: Patient was seen at bedside. Patient denies any complains at this time. Patient has been afebrile, hemodynamically stable. Patient continues to be polyuric and also has a significant output from sump tube.    PRESSORS: [ ] YES [X] NO  WHICH:    ANTIBIOTICS: Zosyn                     Antimicrobial:  piperacillin/tazobactam IVPB.. 3.375 Gram(s) IV Intermittent every 12 hours    Cardiovascular:    Pulmonary:    Hematalogic:    Other:  chlorhexidine 2% Cloths 1 Application(s) Topical <User Schedule>  insulin lispro (ADMELOG) corrective regimen sliding scale   SubCutaneous every 6 hours  lactated ringers. 1000 milliLiter(s) IV Continuous <Continuous>  sodium chloride 0.9% Bolus. 100 milliLiter(s) IV Bolus every 5 minutes PRN  sodium chloride 0.9% lock flush 10 milliLiter(s) IV Push every 1 hour PRN    chlorhexidine 2% Cloths 1 Application(s) Topical <User Schedule>  insulin lispro (ADMELOG) corrective regimen sliding scale   SubCutaneous every 6 hours  lactated ringers. 1000 milliLiter(s) IV Continuous <Continuous>  piperacillin/tazobactam IVPB.. 3.375 Gram(s) IV Intermittent every 12 hours  sodium chloride 0.9% Bolus. 100 milliLiter(s) IV Bolus every 5 minutes PRN  sodium chloride 0.9% lock flush 10 milliLiter(s) IV Push every 1 hour PRN    Drug Dosing Weight  Height (cm): 180.3 (22 Oct 2023 13:59)  Weight (kg): 81.3 (22 Oct 2023 13:59)  BMI (kg/m2): 25 (22 Oct 2023 13:59)  BSA (m2): 2.01 (22 Oct 2023 13:59)    CENTRAL LINE: [X] YES [ ] NO  LOCATION: [facility]  DATE INSERTED: 10/22  REMOVE: [ ] YES [ ] NO  EXPLAIN:    ODONNELL: [X] YES [ ] NO    DATE INSERTED: 10/22  REMOVE:  [ ] YES [ ] NO  EXPLAIN:    A-LINE:  [] YES [X] NO  LOCATION:   DATE INSERTED:   REMOVE:  [ ] YES [ ] NO  EXPLAIN:  PMH -reviewed admission note, no change since admission    ICU Vital Signs Last 24 Hrs  T(C): 36.3 (23 Oct 2023 23:25), Max: 36.5 (23 Oct 2023 12:00)  T(F): 97.3 (23 Oct 2023 23:25), Max: 97.7 (23 Oct 2023 12:00)  HR: 83 (24 Oct 2023 06:00) (66 - 97)  BP: 154/60 (24 Oct 2023 06:00) (121/55 - 178/73)  BP(mean): 87 (24 Oct 2023 06:00) (70 - 106)  ABP: 181/68 (23 Oct 2023 21:00) (100/37 - 181/68)  ABP(mean): 111 (23 Oct 2023 21:00) (58 - 111)  RR: 13 (24 Oct 2023 06:00) (10 - 27)  SpO2: 99% (24 Oct 2023 06:00) (91% - 100%)    O2 Parameters below as of 24 Oct 2023 04:00  Patient On (Oxygen Delivery Method): room air            ABG - ( 23 Oct 2023 08:18 )  pH, Arterial: 7.56  pH, Blood: x     /  pCO2: 30    /  pO2: 86    / HCO3: 27    / Base Excess: 5.2   /  SaO2: 99                    10-23 @ 07:01  -  10-24 @ 07:00  --------------------------------------------------------  IN: 3250.1 mL / OUT: 4600 mL / NET: -1349.9 mL            PHYSICAL EXAM:    GENERAL: NAD, frail looking, sump tube  HEAD:  Atraumatic, Normocephalic  EYES: EOMI, PERRLA, conjunctiva and sclera clear  ENMT: no exudates, or enlargement; Moist mucous membranes,   NECK: Supple, normal appearance, Trachea midline  NERVOUS SYSTEM:  Alert & Oriented X3, Good concentration;  CHEST/LUNG: No chest deformity; Normal auscultation bilaterally; No rales, rhonchi, wheezing   HEART: Regular rate and rhythm; No murmurs, rubs, or gallops  ABDOMEN: Soft, Nontender, Nondistended; hypoactive Bowel sounds present  EXTREMITIES:  2+ Peripheral Pulses, No edema  LYMPH: No lymphadenopathy noted  SKIN: No rashes or lesions; Good capillary refill      LABS:  CBC Full  -  ( 24 Oct 2023 03:14 )  WBC Count : 16.31 K/uL  RBC Count : 3.39 M/uL  Hemoglobin : 9.7 g/dL  Hematocrit : 28.5 %  Platelet Count - Automated : 319 K/uL  Mean Cell Volume : 84.1 fl  Mean Cell Hemoglobin : 28.6 pg  Mean Cell Hemoglobin Concentration : 34.0 gm/dL  Auto Neutrophil # : x  Auto Lymphocyte # : x  Auto Monocyte # : x  Auto Eosinophil # : x  Auto Basophil # : x  Auto Neutrophil % : x  Auto Lymphocyte % : x  Auto Monocyte % : x  Auto Eosinophil % : x  Auto Basophil % : x    10-24    157<H>  |  117<H>  |  86<H>  ----------------------------<  232<H>  3.4<L>   |  34<H>  |  3.17<H>    Ca    8.2<L>      24 Oct 2023 03:14  Phos  4.0     10-24  Mg     2.2     10-24    TPro  5.6<L>  /  Alb  2.5<L>  /  TBili  0.5  /  DBili  x   /  AST  44<H>  /  ALT  47  /  AlkPhos  81  10-23    PT/INR - ( 23 Oct 2023 18:10 )   PT: 15.3 sec;   INR: 1.35 ratio         PTT - ( 23 Oct 2023 18:10 )  PTT:30.7 sec  Urinalysis Basic - ( 24 Oct 2023 03:14 )    Color: x / Appearance: x / SG: x / pH: x  Gluc: 232 mg/dL / Ketone: x  / Bili: x / Urobili: x   Blood: x / Protein: x / Nitrite: x   Leuk Esterase: x / RBC: x / WBC x   Sq Epi: x / Non Sq Epi: x / Bacteria: x      Culture Results:   No growth at 24 hours (10-22 @ 14:20)  Culture Results:   No growth at 24 hours (10-22 @ 14:10)      RADIOLOGY & ADDITIONAL STUDIES REVIEWED:  ***    GOALS OF CARE DISCUSSION WITH PATIENT/FAMILY/PROXY:    CRITICAL CARE TIME SPENT: 35 minutes

## 2023-10-24 NOTE — PHYSICAL THERAPY INITIAL EVALUATION ADULT - GENERAL OBSERVATIONS, REHAB EVAL
Consult received, chart reviewed. Patient received supine in bed, NAD, + Cardiac and SPo2 monitoring, + NG to mari gonzales (pinkish) Rt IJ multilumen. RASS 0; CAM ICU (-). Patient agreed to EVALUATION from Physical Therapist.

## 2023-10-24 NOTE — PHYSICAL THERAPY INITIAL EVALUATION ADULT - ADDITIONAL COMMENTS
-- reports 4 falls in past 6 months, all but 1 when outside, report tripping over feet.   -- reports daughter can provide support as needed.

## 2023-10-24 NOTE — PROGRESS NOTE ADULT - SUBJECTIVE AND OBJECTIVE BOX
ICU VISIT  78y Male    Meds:  piperacillin/tazobactam IVPB.. 3.375 Gram(s) IV Intermittent every 12 hours    Allergies    No Known Drug Allergies  shellfish (Swelling (Moderate))  strawberry (Pruritus; Rash; Swelling (Moderate))    Intolerances        VITALS:  Vital Signs Last 24 Hrs  T(C): 35.8 (24 Oct 2023 12:00), Max: 36.4 (23 Oct 2023 20:02)  T(F): 96.5 (24 Oct 2023 12:00), Max: 97.6 (23 Oct 2023 20:02)  HR: 87 (24 Oct 2023 16:00) (80 - 102)  BP: 164/76 (24 Oct 2023 16:00) (121/55 - 178/78)  BP(mean): 99 (24 Oct 2023 16:00) (74 - 108)  RR: 16 (24 Oct 2023 16:00) (10 - 27)  SpO2: 98% (24 Oct 2023 16:00) (91% - 100%)    Parameters below as of 24 Oct 2023 07:00  Patient On (Oxygen Delivery Method): room air        LABS/DIAGNOSTIC TESTS:                          9.7    16.31 )-----------( 319      ( 24 Oct 2023 03:14 )             28.5         10-24    159<H>  |  120<H>  |  64<H>  ----------------------------<  206<H>  3.4<L>   |  33<H>  |  2.43<H>    Ca    8.2<L>      24 Oct 2023 11:52  Phos  2.6     10-24  Mg     2.2     10-24    TPro  5.6<L>  /  Alb  2.5<L>  /  TBili  0.5  /  DBili  x   /  AST  44<H>  /  ALT  47  /  AlkPhos  81  10-23      LIVER FUNCTIONS - ( 23 Oct 2023 03:10 )  Alb: 2.5 g/dL / Pro: 5.6 g/dL / ALK PHOS: 81 U/L / ALT: 47 U/L DA / AST: 44 U/L / GGT: x             CULTURES: .Blood Blood-Peripheral  10-22 @ 14:20   No growth at 24 hours  --  --      .Blood Blood-Peripheral  10-22 @ 14:10   No growth at 24 hours  --  --      Catheterized Catheterized  10-22 @ 12:36   No growth  --  --            RADIOLOGY:      ROS:  [  ] UNABLE TO ELICIT ICU VISIT  78y Male who is doing dramatically better, he is currently in the ICU still,  but is being downgraded to the floor in a short while. He is now awake , alert and oriented. He has no fevers today and is off pressors. He is denying any SOB, no coughing , no chest pain , no diarrhea, abdominal pain or vomiting. His blood and urine cultures are both negative but was on antibiotics in the outpatient setting.    Meds:  piperacillin/tazobactam IVPB.. 3.375 Gram(s) IV Intermittent every 12 hours    Allergies    No Known Drug Allergies  shellfish (Swelling (Moderate))  strawberry (Pruritus; Rash; Swelling (Moderate))    Intolerances        VITALS:  Vital Signs Last 24 Hrs  T(C): 35.8 (24 Oct 2023 12:00), Max: 36.4 (23 Oct 2023 20:02)  T(F): 96.5 (24 Oct 2023 12:00), Max: 97.6 (23 Oct 2023 20:02)  HR: 87 (24 Oct 2023 16:00) (80 - 102)  BP: 164/76 (24 Oct 2023 16:00) (121/55 - 178/78)  BP(mean): 99 (24 Oct 2023 16:00) (74 - 108)  RR: 16 (24 Oct 2023 16:00) (10 - 27)  SpO2: 98% (24 Oct 2023 16:00) (91% - 100%)    Parameters below as of 24 Oct 2023 07:00  Patient On (Oxygen Delivery Method): room air        LABS/DIAGNOSTIC TESTS:                          9.7    16.31 )-----------( 319      ( 24 Oct 2023 03:14 )             28.5         10-24    159<H>  |  120<H>  |  64<H>  ----------------------------<  206<H>  3.4<L>   |  33<H>  |  2.43<H>    Ca    8.2<L>      24 Oct 2023 11:52  Phos  2.6     10-24  Mg     2.2     10-24    TPro  5.6<L>  /  Alb  2.5<L>  /  TBili  0.5  /  DBili  x   /  AST  44<H>  /  ALT  47  /  AlkPhos  81  10-23      LIVER FUNCTIONS - ( 23 Oct 2023 03:10 )  Alb: 2.5 g/dL / Pro: 5.6 g/dL / ALK PHOS: 81 U/L / ALT: 47 U/L DA / AST: 44 U/L / GGT: x             CULTURES: .Blood Blood-Peripheral  10-22 @ 14:20   No growth at 24 hours  --  --      .Blood Blood-Peripheral  10-22 @ 14:10   No growth at 24 hours  --  --      Catheterized Catheterized  10-22 @ 12:36   No growth  --  --            RADIOLOGY:      ROS:  [  ] UNABLE TO ELICIT

## 2023-10-24 NOTE — PHYSICAL THERAPY INITIAL EVALUATION ADULT - GAIT DISTANCE, PT EVAL
~4ft limited by lines. NG to sump. Also. pt. reporting feeling urge to have BM (RN advised)Will advance as able/bed to chair

## 2023-10-24 NOTE — PROGRESS NOTE ADULT - ASSESSMENT
AKIN:  Likely ATN from sepsis. No evidence of obstruction.  S/P HD on 10/22. He is voiding > 70 ml/hour.  - Pressor support, targeting MAP > 65.  - Monitor BMP.  - Avoid nephrotoxins.  - Will hold HD as he is in recovery.        Metabolic Alkalosis:  Initially, he had High AG Metabolic Acidosis, likely Lactic Acidosis form septic shock.   This has resolved and he is now Alkalotic. Possible due to volume contraction or wasting of Anions in the urine.  - Follow up BMP.    Hypernatremia:  Due to volume contraction. He has post AKIN polyuria, likely hypoosmotic.  - Send urine OSM.  - Hypotonic fluids.        Hypokalemia:  - Supplement as needed.    Incarcerated Hernia:  - Surgery follow up

## 2023-10-24 NOTE — ADVANCED PRACTICE NURSE CONSULT - ASSESSMENT
This is a 78yr old male patient admitted for Acute Kidney Failure, presenting with a Stage 1 Pressure Injury to the Coccyx, as evident by non-blanchable erythema
This is a 78yr old male patient admitted for Acute Kidney Failure presenting with a Stage 1 Pressure Injury to the Coccyx and Bilateral Heels, as evident by non-blanchable erythema

## 2023-10-24 NOTE — PROGRESS NOTE ADULT - SUBJECTIVE AND OBJECTIVE BOX
Nixon Schultz MD  Nephrology      ANITA KOLB  78y  Patient is a 78y old  Male who presents with a chief complaint of acute renal failure (24 Oct 2023 07:25)    HPI:  Seen and examined. He is followed for AKIN.   HD on 10/22, off since and he remains Non-Oliguric.    HEALTH ISSUES - PROBLEM Dx:        MEDICATIONS  (STANDING):  chlorhexidine 2% Cloths 1 Application(s) Topical <User Schedule>  insulin lispro (ADMELOG) corrective regimen sliding scale   SubCutaneous every 6 hours  lactated ringers. 1000 milliLiter(s) (200 mL/Hr) IV Continuous <Continuous>  piperacillin/tazobactam IVPB.. 3.375 Gram(s) IV Intermittent every 12 hours    MEDICATIONS  (PRN):  sodium chloride 0.9% Bolus. 100 milliLiter(s) IV Bolus every 5 minutes PRN SBP LESS THAN or EQUAL to 90 mmHg  sodium chloride 0.9% lock flush 10 milliLiter(s) IV Push every 1 hour PRN Pre/post blood products, medications, blood draw, and to maintain line patency    Vital Signs Last 24 Hrs  T(C): 35.6 (24 Oct 2023 04:00), Max: 36.5 (23 Oct 2023 12:00)  T(F): 96 (24 Oct 2023 04:00), Max: 97.7 (23 Oct 2023 12:00)  HR: 84 (24 Oct 2023 08:00) (74 - 97)  BP: 165/63 (24 Oct 2023 08:00) (121/55 - 178/73)  BP(mean): 94 (24 Oct 2023 08:00) (70 - 106)  RR: 18 (24 Oct 2023 08:00) (10 - 27)  SpO2: 93% (24 Oct 2023 08:00) (91% - 100%)    Parameters below as of 24 Oct 2023 07:00  Patient On (Oxygen Delivery Method): room air      Daily     Daily Weight in k.8 (24 Oct 2023 08:00)    PHYSICAL EXAM:  Constitutional:  He appears comfortable and not distressed. Not diaphoretic.    Neck:  The thyroid is normal. Trachea is midline.     Respiratory: The lungs are clear to auscultation. No dullness and expansion is normal.    Cardiovascular: S1 and S2 are normal. No murmurs rubs or gallops are present.    Gastrointestinal: The abdomen is soft. No tenderness is present. Left inguinal hernia.    Genitourinary: The bladder is not distended. No CVA tenderness is present.    Extremities: No edema is noted. No deformities are present.    Neurological: Cognition is normal. Tone, power and sensation are normal. Gait is steady.    Skin: No lesions are seen  or palpated.    Lymph Nodes: No lymphadenopathy is present.    I&O's Summary    23 Oct 2023 07:01  -  24 Oct 2023 07:00  --------------------------------------------------------  IN: 3550.1 mL / OUT: 5600 mL / NET: -2049.9 mL                            9.7    16.31 )-----------( 319      ( 24 Oct 2023 03:14 )             28.5     1024    157<H>  |  117<H>  |  86<H>  ----------------------------<  232<H>  3.4<L>   |  34<H>  |  3.17<H>    Ca    8.2<L>      24 Oct 2023 03:14  Phos  4.0     10-24  Mg     2.2     10-24    TPro  5.6<L>  /  Alb  2.5<L>  /  TBili  0.5  /  DBili  x   /  AST  44<H>  /  ALT  47  /  AlkPhos  81  10    pH, Venous: 7.46  pCO2, Venous: 49 mmHg  pO2, Venous: 36 mmHg  HCO3, Venous: 35 mmol/L  Base Excess, Venous: 9.4 mmol/L  Oxygen Saturation, Venous: 59.3 %  FIO2, Venous: 21.0

## 2023-10-24 NOTE — PROGRESS NOTE ADULT - ASSESSMENT
Septic Shock - resolved  UTI  Leukocytosis   R/O aspiration Pneumonia  new onset A. Fib  AKIN - improving  ? ileus      Plan - Cont Zosyn 3.375gms iv q12hrs for now   Septic Shock - resolved  UTI  Leukocytosis - improving slowly  R/O aspiration Pneumonia  AKIN - improving  ? ileus      Plan - Cont Zosyn 3.375gms iv q12hrs for now  Time spent - 30 mins

## 2023-10-24 NOTE — PHYSICAL THERAPY INITIAL EVALUATION ADULT - IMPAIRED TRANSFERS: SIT/STAND, REHAB EVAL
impaired balance/impaired postural control/decreased ROM/decreased sensation/impaired sensory feedback/decreased strength

## 2023-10-24 NOTE — PROGRESS NOTE ADULT - ASSESSMENT
Patient is a 79 y/o M, lives alone at home, ambulates w/ a cane. He has PMHx of HTN, DM, BPH. He is a poor historian and collateral Hx was obtained from his daughter, Ms. Rosa Mcneil ([phone]) at bedside. Admitted for acute renal failure    #Acute Renal Failure  #Metabolic Acidosis  #Hyperkalemia  #Septic Shock  #Acute Metabolic Encephalopathy  #Incarcerated Ventral Hernia / Large Bowel Obstruction  #Hypertension  #Type 2 Diabetes Mellitus  #Benign Prostatic Hyperplasia    _________CNS___________    #Acute Metabolic Encephalopathy  - Resolved  - baseline: AAOx3  - currently AAOx3   - CTA H/N - neg CVA  - UTox - neg  - likely 2/2 to septic shock 2/2 to large bowel obstruction.     _________CVS___________    #Septic Shock (resolved)  - Likely 2/2 to large bowel obstruction vs UTI.   - P/w elevated WBC 19, lactate 3.8 and hypotension  - Lactate 3.8>7.5>1.5  - treated for UTI w/ Cipro outpatient for 5 days.   - UA+  - CT abdomen/pelvis: Large left inguinal hernia containing colon. There is dilatation of the colon proximal to the hernia suggestive of large bowel obstruction.  - start Zosyn  - ID (Dr. Bravo) consulted  - D/C levophed and vasopressin.   - f/u UCx, BCx    #Hypertension  - home meds - amlodipine, benazepril  - BP monitoring  - hold benazepril in the setting of AKIN  - hold amlodipine in setting of shock    #New onset Atrial fibrillation w/ RVR  - Likely in setting of sepsis.   - Started on amiodarone and heparin. Now D/C as patient converted to sinus rhythm.   - EKG 10/23/23 - EKG: NSR    _________ RESP__________    #no issues    __________GI____________    #Incarcerated Ventral Hernia  #Large Bowel Obstruction  - CT Abd: Large left inguinal hernia containing colon. There is dilatation of the colon proximal to the hernia suggestive of large bowel obstruction.- NPO for now  - NGT to suction - output 2.2L  - Surgery consulted - successful manual reduction     ________ RENAL__________    #Acute Renal Failure  - SCr (baseline) - 0.96  - BUN/Scr - 206/12.60 on admission.   - Cr - 12.6>5.97  - FeNa - 3.2 (intrinsic)  - S/P HD on 10/23  - Now likely in polyuric phase of ATN, Urine output 7L past 24 hours 10/23/23.   - ESR - 27, CRP - 109  - HIV - negative, Hepatitis panel - negative,   - F/U GEMMA, ANCA, C3, SPEP/UPEP  - monitor BMP Q4  - Start standing LR @100 to replete volume loss from renal vs GI.   - avoid nephrotoxic agents    #Metabolic Acidosis  - Resolved.  - in setting of lactic acidosis.   - Lactate - 3.8>1..5  - AG 32>19  - s/p 150 mg NaHCO3 x 2  - D/C Bicarb drip  - monitor ABG    #respiratory alkalosis with metabolic alkalosis  - 7.56/30/86/27  - Likely 2/2 to contraction alkalosis and tachypnea in setting of abdominal pain.     #Hyperkalemia (resolved)  - K - 6.3>3.6  - EKG - NSR, TWI on anterior leads  - Trop - neg  - s/p Ca Gluc + hyperkalemia cocktail (insulin/dextrose + lokelma)  - monitor BMP    #BPH  - home meds - tamsulosin, finasteride  - hold home meds while NPO    _________MSK___________    #Elevated CPK  - 466  - continue IVF hydration    __________ID____________    #Septic Shock  - see above    _________ENDO__________    #DM  - home meds - metformin, glipizide  - hold PO meds  - start insulin sliding scale q6 while NPO  - Accu Cheks Q6  - f/u a1c    _______HEME/ONC_______    #no issues    _________SKIN____________    - L Ext dwell 10/22/2023  - R shiley 10/22/2023  - LIJ 10/22/2023  - R. axillary arterial line 10/22/2023    ________Prophylaxis_______    #DVT - heparin SQ     __________GOC/ DISPO___________    GOC - DNR/DNI  DISPO - Admit to ICU Patient is a 79 y/o M, lives alone at home, ambulates w/ a cane. He has PMHx of HTN, DM, BPH. He is a poor historian and collateral Hx was obtained from his daughter, Ms. Rosa Mcneil ([phone]) at bedside. Admitted for acute renal failure    #Acute Renal Failure  #Metabolic Acidosis  #Hyperkalemia  #Septic Shock  #Acute Metabolic Encephalopathy  #Incarcerated Ventral Hernia / Large Bowel Obstruction  #Hypertension  #Type 2 Diabetes Mellitus  #Benign Prostatic Hyperplasia    _________CNS___________    #Acute Metabolic Encephalopathy  - Resolved  - Currently at baseline AAOx3  - CTA H/N - neg CVA  - UTox - neg  - likely 2/2 to septic shock 2/2 to large bowel obstruction.     _________CVS___________    #Septic Shock (resolved)  - Likely 2/2 to large bowel obstruction vs UTI.   - P/w elevated WBC 19, lactate 3.8 and hypotension  - Lactate 3.8>7.5>1.5  - treated for UTI w/ Cipro outpatient for 5 days.   - UA+  - CT abdomen/pelvis: Large left inguinal hernia containing colon. There is dilatation of the colon proximal to the hernia suggestive of large bowel obstruction.  - start Zosyn  - ID (Dr. Bravo) consulted  - D/C levophed and vasopressin.   - f/u UCx, BCx    #Hypertension  - home meds - amlodipine, benazepril  - BP monitoring  - hold benazepril in the setting of AKIN  - hold amlodipine in setting of shock    #New onset Atrial fibrillation w/ RVR  - Likely in setting of sepsis.   - Started on amiodarone and heparin. Now D/C as patient converted to sinus rhythm.   - EKG 10/23/23 - EKG: NSR    _________ RESP__________    #no issues    __________GI____________    #Incarcerated Ventral Hernia  #Large Bowel Obstruction  - CT Abd: Large left inguinal hernia containing colon. There is dilatation of the colon proximal to the hernia suggestive of large bowel obstruction.- NPO for now  - NGT to suction - output 2.2L  - Surgery consulted - successful manual reduction     ________ RENAL__________    #Acute Renal Failure  - SCr (baseline) - 0.96  - BUN/Scr - 206/12.60 on admission.   - Cr - 12.6>5.97  - FeNa - 3.2 (intrinsic)  - S/P HD on 10/23  - Now likely in polyuric phase of ATN, Urine output 7L past 24 hours 10/23/23.   - ESR - 27, CRP - 109  - HIV - negative, Hepatitis panel - negative,   - F/U GEMMA, ANCA, C3, SPEP/UPEP  - monitor BMP Q4  - Start standing LR @100 to replete volume loss from renal vs GI.   - avoid nephrotoxic agents    #Metabolic Acidosis  - Resolved.  - in setting of lactic acidosis.   - Lactate - 3.8>1..5  - AG 32>19  - s/p 150 mg NaHCO3 x 2  - D/C Bicarb drip  - monitor ABG    #respiratory alkalosis with metabolic alkalosis  - 7.56/30/86/27  - Likely 2/2 to contraction alkalosis and tachypnea in setting of abdominal pain.     #Hyperkalemia (resolved)  - K - 6.3>3.6  - EKG - NSR, TWI on anterior leads  - Trop - neg  - s/p Ca Gluc + hyperkalemia cocktail (insulin/dextrose + lokelma)  - monitor BMP    #BPH  - home meds - tamsulosin, finasteride  - hold home meds while NPO    _________MSK___________    #Elevated CPK  - 466  - continue IVF hydration    __________ID____________    #Septic Shock  - see above    _________ENDO__________    #DM  - home meds - metformin, glipizide  - hold PO meds  - start insulin sliding scale q6 while NPO  - Accu Cheks Q6  - f/u a1c    _______HEME/ONC_______    #no issues    _________SKIN____________    - L Ext dwell 10/22/2023  - R shiley 10/22/2023  - LIJ 10/22/2023  - R. axillary arterial line 10/22/2023    ________Prophylaxis_______    #DVT - heparin SQ     __________GOC/ DISPO___________    GOC - DNR/DNI  DISPO - Admit to ICU Patient is a 79 y/o M, lives alone at home, ambulates w/ a cane. He has PMHx of HTN, DM, BPH. He is a poor historian and collateral Hx was obtained from his daughter, Ms. Rosa Mcneil ([phone]) at bedside. Admitted for acute renal failure    #Acute Renal Failure  #Metabolic Acidosis  #Hyperkalemia  #Septic Shock  #Acute Metabolic Encephalopathy  #Incarcerated Ventral Hernia / Large Bowel Obstruction  #Hypertension  #Type 2 Diabetes Mellitus  #Benign Prostatic Hyperplasia    _________CNS___________    #Acute Metabolic Encephalopathy  - Resolved  - Currently at baseline AAOx3  - CTA H/N - neg CVA  - UTox - neg  - likely 2/2 to septic shock 2/2 to large bowel obstruction.     _________CVS___________    #Septic Shock (resolved)  - Likely 2/2 to large bowel obstruction vs UTI.   - P/w elevated WBC 19, lactate 3.8 and hypotension  - Lactate 3.8>7.5>1.5  - treated for UTI w/ Cipro outpatient for 5 days.   - UA+  - CT abdomen/pelvis: Large left inguinal hernia containing colon. There is dilatation of the colon proximal to the hernia suggestive of large bowel obstruction.  - C/w Zosyn (day 3 of total 7 days)  - D/C levophed and vasopressin.   - Bcx - NGTD  - ID (Dr. Bravo) consulted    #Hypertension  - home meds - amlodipine, benazepril  - BP monitoring  - hold benazepril in the setting of AKIN  - hold amlodipine until NPO  - Can give PRN pushes of labetalol for acutely elevated BP until NPO.     #New onset Atrial fibrillation w/ RVR (resolved)  - Likely in setting of sepsis.   - Started on amiodarone and heparin. Now D/C as patient converted to sinus rhythm.   - EKG 10/23/23 - EKG: NSR    _________ RESP__________    #no issues    __________GI____________    #Incarcerated Ventral Hernia  #Large Bowel Obstruction  - CT Abd: Large left inguinal hernia containing colon. There is dilatation of the colon proximal to the hernia suggestive of large bowel obstruction.  - NPO for now  - NGT to suction - until reduced output.   - Surgery consulted - successful manual reduction     ________ RENAL__________    #Acute Renal Failure  - Cr - 12.6>5>>3.17 (baseline) - 0.96  - FeNa - 3.2 (intrinsic)  - S/P HD on 10/23  - Now likely in polyuric phase of ATN, Urine output 4L past 24 hours 10/24/23.   - ESR - 27, CRP - 109  - HIV - negative, Hepatitis panel - negative,   - F/U GEMMA, ANCA, C3, SPEP/UPEP  - monitor BMP Q4  - C/w standing D5-LR @100 to replete volume loss from renal vs GI.   - avoid nephrotoxic agents    #Metabolic Acidosis  - Resolved.  - in setting of lactic acidosis.   - Lactate - 3.8>1..5  - monitor ABG      #Hyperkalemia (resolved)  - K - 6.3>3.6  - EKG - NSR, TWI on anterior leads  - Trop - neg  - s/p Ca Gluc + hyperkalemia cocktail (insulin/dextrose + lokelma)  - monitor BMP    #Hypernatremia:  - Na 143>>157  - Likely 2/2 to hypovolemia in setting of polyuric phase.   - Replete with D5+LR.   - F/U BMP Q4  - Avoid over correction > 8meQ.    #BPH  - home meds - tamsulosin, finasteride  - hold home meds while NPO    _________MSK___________    #Elevated CPK  - 466  - continue IVF hydration    __________ID____________    #Septic Shock  - see above    _________ENDO__________    #DM  - home meds - metformin, glipizide  - hold PO meds  - start insulin sliding scale q6 while NPO  - Accu Cheks Q6  - f/u a1c    _______HEME/ONC_______    #no issues    _________SKIN____________    - L Ext dwell 10/22/2023  - R shiley 10/22/2023  - LIJ 10/22/2023  - R. axillary arterial line 10/22/2023    ________Prophylaxis_______    #DVT - heparin SQ     __________GOC/ DISPO___________    GOC - DNR/DNI  DISPO - DG to medicine.

## 2023-10-24 NOTE — CHART NOTE - NSCHARTNOTEFT_GEN_A_CORE
Patient is a 77 y/o M, lives alone at home, ambulates w/ a cane. He has PMHx of HTN, DM, BPH. He is a poor historian and collateral Hx was obtained from his daughter. Patient was trying to get off his bed when he fell down on the floor. He was found after an unspecified number of hours. He was noted to have slurred speech and altered mental status. Of note, he was noted to have been taking Ciprofloxacin for UTI. Patient was admitted to ICU for septic shock 2/2 to UTI vs large bowel obstruction and AKIN requiring emergent HD.     Patient met criteria for septic shock and received aggressive fluid hydration and was started on vasopressor Levophed and vasopressin. Patient urinalysis was positive for a UTI.  Patient CT abdomen/pelvis suggestive of large bowel obstruction. Patient was started on zosyn. Patient blood cultures and urine cultures are NGTD. Patient was eventually taken off the vasopressors and is now hemodynamically stable.     Patient CT abdomen/pelvis showed large left inguinal hernia containing colon. There is dilatation of the colon proximal to the hernia suggestive of large bowel obstruction. Surgery team reduced the hernia manually at [facility]. Patient was also started on NGT on suction and was put NPO.     Patient also had severe AKIN on admission with Cr -12.6 (baseline - 0.96) which improved to 3.17 on 10/24. Patient FeNA is 3.2 (ATN). Patient required emergent HD on 10/22 in setting of AMS and hyperkalemia on admission. Patient is now in polyuric phase of ATN and has produced 11L urine in past 48 hours.     As patient is losing significent fluid and electrolye patient was repleted with D5/LR and repleted electrolyes as needed.     Patient also had metabolic acidosis on admission likely 2/2 to lactic acidosis and in setting of AKIN. It has now resolved with aggressive fluid hydration and improving AKIN.     Patient also developed New onset Atrial fibrillation w/ RVR likely in setting of sepsis. Patient was started on amiodarone and heparin. Now D/C as patient converted to sinus rhythm.     Patient developed hypernatermia likely in setting of hypovolemia due to polyuric phase of ATN. Patient is being repleted with D5W.     Patient is stable for downgrade to medicine.     Signed out to NP XXXX and attending  XXXXX    Things to follow:  [ ] NGT output daily.   [ ] NPO until NGT discontinued.   [ ] F/U Cr  [ ] F/U Bcx and Ucx  [ ] Zosyn total 7 days. Patient is a 79 y/o M, lives alone at home, ambulates w/ a cane. He has PMHx of HTN, DM, BPH. He is a poor historian and collateral Hx was obtained from his daughter. Patient was trying to get off his bed when he fell down on the floor. He was found after an unspecified number of hours. He was noted to have slurred speech and altered mental status. Of note, he was noted to have been taking Ciprofloxacin for UTI. Patient was admitted to ICU for septic shock 2/2 to UTI vs large bowel obstruction and AKIN requiring emergent HD.     Patient met criteria for septic shock and received aggressive fluid hydration and was started on vasopressor Levophed and vasopressin. Patient urinalysis was positive for a UTI.  Patient CT abdomen/pelvis suggestive of large bowel obstruction. Patient was started on zosyn. Patient blood cultures and urine cultures are NGTD. Patient was eventually taken off the vasopressors and is now hemodynamically stable.     Patient CT abdomen/pelvis showed large left inguinal hernia containing colon. There is dilatation of the colon proximal to the hernia suggestive of large bowel obstruction. Surgery team reduced the hernia manually at [facility]. Patient was also started on NGT on suction and was put NPO.     Patient also had severe AKIN on admission with Cr -12.6 (baseline - 0.96) which improved to 3.17 on 10/24. Patient FeNA is 3.2 (ATN). Patient required emergent HD on 10/22 in setting of AMS and hyperkalemia on admission. Patient is now in polyuric phase of ATN and has produced 11L urine in past 48 hours.     As patient is losing significent fluid and electrolye patient was repleted with D5/LR and repleted electrolyes as needed.     Patient also had metabolic acidosis on admission likely 2/2 to lactic acidosis and in setting of AKIN. It has now resolved with aggressive fluid hydration and improving AKIN.     Patient also developed New onset Atrial fibrillation w/ RVR likely in setting of sepsis. Patient was started on amiodarone and heparin. Now D/C as patient converted to sinus rhythm.     Patient developed hypernatermia likely in setting of hypovolemia due to polyuric phase of ATN. Patient is being repleted with D5W.     Patient is stable for downgrade to medicine.     Signed out to GIUSEPPE Springer and attending Dr. Marques.    Things to follow:  [ ] NGT output daily.   [ ] NPO until NGT discontinued.   [ ] F/U Cr  [ ] F/U Bcx and Ucx  [ ] Zosyn total 7 days.

## 2023-10-24 NOTE — PROGRESS NOTE ADULT - ASSESSMENT
78y.o. Male with chronic L inguinoscrotal hernia, reduced by surgical team  NGT monitored, continued to have high output, rec NGT to bedside bag and if output decreases, can d/c    Plan:   - Pt still not interested in surgical repair if not emergently indicated.  -Will treat obstruction conservatively  -Continue to Monitor NGT output.  -possible NGT removal and diet advancement as symptoms improve.  -remainder of care per primary team  -discussed with Dr. Toro

## 2023-10-25 DIAGNOSIS — E87.0 HYPEROSMOLALITY AND HYPERNATREMIA: ICD-10-CM

## 2023-10-25 DIAGNOSIS — N17.9 ACUTE KIDNEY FAILURE, UNSPECIFIED: ICD-10-CM

## 2023-10-25 DIAGNOSIS — Z29.9 ENCOUNTER FOR PROPHYLACTIC MEASURES, UNSPECIFIED: ICD-10-CM

## 2023-10-25 DIAGNOSIS — K43.2 INCISIONAL HERNIA WITHOUT OBSTRUCTION OR GANGRENE: ICD-10-CM

## 2023-10-25 DIAGNOSIS — Z98.890 OTHER SPECIFIED POSTPROCEDURAL STATES: ICD-10-CM

## 2023-10-25 DIAGNOSIS — E11.9 TYPE 2 DIABETES MELLITUS WITHOUT COMPLICATIONS: ICD-10-CM

## 2023-10-25 DIAGNOSIS — I10 ESSENTIAL (PRIMARY) HYPERTENSION: ICD-10-CM

## 2023-10-25 DIAGNOSIS — I48.91 UNSPECIFIED ATRIAL FIBRILLATION: ICD-10-CM

## 2023-10-25 DIAGNOSIS — G93.41 METABOLIC ENCEPHALOPATHY: ICD-10-CM

## 2023-10-25 LAB
% ALBUMIN: 50.5 % — SIGNIFICANT CHANGE UP
% ALBUMIN: 50.5 % — SIGNIFICANT CHANGE UP
% ALPHA 1: 9.7 % — SIGNIFICANT CHANGE UP
% ALPHA 1: 9.7 % — SIGNIFICANT CHANGE UP
% ALPHA 2: 16.3 % — SIGNIFICANT CHANGE UP
% ALPHA 2: 16.3 % — SIGNIFICANT CHANGE UP
% BETA: 10 % — SIGNIFICANT CHANGE UP
% BETA: 10 % — SIGNIFICANT CHANGE UP
% GAMMA: 13.5 % — SIGNIFICANT CHANGE UP
% GAMMA: 13.5 % — SIGNIFICANT CHANGE UP
% M SPIKE: SIGNIFICANT CHANGE UP
% M SPIKE: SIGNIFICANT CHANGE UP
ALBUMIN SERPL ELPH-MCNC: 2.6 G/DL — LOW (ref 3.6–5.5)
ALBUMIN SERPL ELPH-MCNC: 2.6 G/DL — LOW (ref 3.6–5.5)
ALBUMIN/GLOB SERPL ELPH: 1 RATIO — SIGNIFICANT CHANGE UP
ALBUMIN/GLOB SERPL ELPH: 1 RATIO — SIGNIFICANT CHANGE UP
ALPHA1 GLOB SERPL ELPH-MCNC: 0.5 G/DL — HIGH (ref 0.1–0.4)
ALPHA1 GLOB SERPL ELPH-MCNC: 0.5 G/DL — HIGH (ref 0.1–0.4)
ALPHA2 GLOB SERPL ELPH-MCNC: 0.8 G/DL — SIGNIFICANT CHANGE UP (ref 0.5–1)
ALPHA2 GLOB SERPL ELPH-MCNC: 0.8 G/DL — SIGNIFICANT CHANGE UP (ref 0.5–1)
ANA TITR SER: NEGATIVE — SIGNIFICANT CHANGE UP
ANA TITR SER: NEGATIVE — SIGNIFICANT CHANGE UP
ANION GAP SERPL CALC-SCNC: 4 MMOL/L — LOW (ref 5–17)
ANION GAP SERPL CALC-SCNC: 4 MMOL/L — LOW (ref 5–17)
ANION GAP SERPL CALC-SCNC: 6 MMOL/L — SIGNIFICANT CHANGE UP (ref 5–17)
ANION GAP SERPL CALC-SCNC: 6 MMOL/L — SIGNIFICANT CHANGE UP (ref 5–17)
B-GLOBULIN SERPL ELPH-MCNC: 0.5 G/DL — SIGNIFICANT CHANGE UP (ref 0.5–1)
B-GLOBULIN SERPL ELPH-MCNC: 0.5 G/DL — SIGNIFICANT CHANGE UP (ref 0.5–1)
BUN SERPL-MCNC: 42 MG/DL — HIGH (ref 7–18)
BUN SERPL-MCNC: 42 MG/DL — HIGH (ref 7–18)
BUN SERPL-MCNC: 48 MG/DL — HIGH (ref 7–18)
BUN SERPL-MCNC: 48 MG/DL — HIGH (ref 7–18)
CALCIUM SERPL-MCNC: 8.1 MG/DL — LOW (ref 8.4–10.5)
CALCIUM SERPL-MCNC: 8.1 MG/DL — LOW (ref 8.4–10.5)
CALCIUM SERPL-MCNC: 8.8 MG/DL — SIGNIFICANT CHANGE UP (ref 8.4–10.5)
CALCIUM SERPL-MCNC: 8.8 MG/DL — SIGNIFICANT CHANGE UP (ref 8.4–10.5)
CHLORIDE SERPL-SCNC: 109 MMOL/L — HIGH (ref 96–108)
CHLORIDE SERPL-SCNC: 109 MMOL/L — HIGH (ref 96–108)
CHLORIDE SERPL-SCNC: 115 MMOL/L — HIGH (ref 96–108)
CHLORIDE SERPL-SCNC: 115 MMOL/L — HIGH (ref 96–108)
CO2 SERPL-SCNC: 33 MMOL/L — HIGH (ref 22–31)
CO2 SERPL-SCNC: 33 MMOL/L — HIGH (ref 22–31)
CO2 SERPL-SCNC: 34 MMOL/L — HIGH (ref 22–31)
CO2 SERPL-SCNC: 34 MMOL/L — HIGH (ref 22–31)
CREAT ?TM UR-MCNC: 58 MG/DL — SIGNIFICANT CHANGE UP
CREAT ?TM UR-MCNC: 58 MG/DL — SIGNIFICANT CHANGE UP
CREAT SERPL-MCNC: 1.96 MG/DL — HIGH (ref 0.5–1.3)
CREAT SERPL-MCNC: 1.96 MG/DL — HIGH (ref 0.5–1.3)
CREAT SERPL-MCNC: 2.06 MG/DL — HIGH (ref 0.5–1.3)
CREAT SERPL-MCNC: 2.06 MG/DL — HIGH (ref 0.5–1.3)
EGFR: 33 ML/MIN/1.73M2 — LOW
EGFR: 33 ML/MIN/1.73M2 — LOW
EGFR: 35 ML/MIN/1.73M2 — LOW
EGFR: 35 ML/MIN/1.73M2 — LOW
GAMMA GLOBULIN: 0.7 G/DL — SIGNIFICANT CHANGE UP (ref 0.6–1.6)
GAMMA GLOBULIN: 0.7 G/DL — SIGNIFICANT CHANGE UP (ref 0.6–1.6)
GLUCOSE BLDC GLUCOMTR-MCNC: 232 MG/DL — HIGH (ref 70–99)
GLUCOSE BLDC GLUCOMTR-MCNC: 232 MG/DL — HIGH (ref 70–99)
GLUCOSE BLDC GLUCOMTR-MCNC: 290 MG/DL — HIGH (ref 70–99)
GLUCOSE BLDC GLUCOMTR-MCNC: 290 MG/DL — HIGH (ref 70–99)
GLUCOSE BLDC GLUCOMTR-MCNC: 353 MG/DL — HIGH (ref 70–99)
GLUCOSE BLDC GLUCOMTR-MCNC: 353 MG/DL — HIGH (ref 70–99)
GLUCOSE SERPL-MCNC: 236 MG/DL — HIGH (ref 70–99)
GLUCOSE SERPL-MCNC: 236 MG/DL — HIGH (ref 70–99)
GLUCOSE SERPL-MCNC: 331 MG/DL — HIGH (ref 70–99)
GLUCOSE SERPL-MCNC: 331 MG/DL — HIGH (ref 70–99)
HCT VFR BLD CALC: 32.9 % — LOW (ref 39–50)
HCT VFR BLD CALC: 32.9 % — LOW (ref 39–50)
HGB BLD-MCNC: 10.5 G/DL — LOW (ref 13–17)
HGB BLD-MCNC: 10.5 G/DL — LOW (ref 13–17)
M-SPIKE: SIGNIFICANT CHANGE UP (ref 0–0)
M-SPIKE: SIGNIFICANT CHANGE UP (ref 0–0)
MAGNESIUM SERPL-MCNC: 1.8 MG/DL — SIGNIFICANT CHANGE UP (ref 1.6–2.6)
MAGNESIUM SERPL-MCNC: 1.8 MG/DL — SIGNIFICANT CHANGE UP (ref 1.6–2.6)
MCHC RBC-ENTMCNC: 28.5 PG — SIGNIFICANT CHANGE UP (ref 27–34)
MCHC RBC-ENTMCNC: 28.5 PG — SIGNIFICANT CHANGE UP (ref 27–34)
MCHC RBC-ENTMCNC: 31.9 GM/DL — LOW (ref 32–36)
MCHC RBC-ENTMCNC: 31.9 GM/DL — LOW (ref 32–36)
MCV RBC AUTO: 89.4 FL — SIGNIFICANT CHANGE UP (ref 80–100)
MCV RBC AUTO: 89.4 FL — SIGNIFICANT CHANGE UP (ref 80–100)
NRBC # BLD: 0 /100 WBCS — SIGNIFICANT CHANGE UP (ref 0–0)
NRBC # BLD: 0 /100 WBCS — SIGNIFICANT CHANGE UP (ref 0–0)
OSMOLALITY UR: 451 MOS/KG — SIGNIFICANT CHANGE UP (ref 50–1200)
OSMOLALITY UR: 451 MOS/KG — SIGNIFICANT CHANGE UP (ref 50–1200)
PHOSPHATE SERPL-MCNC: 2.7 MG/DL — SIGNIFICANT CHANGE UP (ref 2.5–4.5)
PHOSPHATE SERPL-MCNC: 2.7 MG/DL — SIGNIFICANT CHANGE UP (ref 2.5–4.5)
PLATELET # BLD AUTO: 257 K/UL — SIGNIFICANT CHANGE UP (ref 150–400)
PLATELET # BLD AUTO: 257 K/UL — SIGNIFICANT CHANGE UP (ref 150–400)
POTASSIUM SERPL-MCNC: 3.8 MMOL/L — SIGNIFICANT CHANGE UP (ref 3.5–5.3)
POTASSIUM SERPL-MCNC: 3.8 MMOL/L — SIGNIFICANT CHANGE UP (ref 3.5–5.3)
POTASSIUM SERPL-MCNC: 3.9 MMOL/L — SIGNIFICANT CHANGE UP (ref 3.5–5.3)
POTASSIUM SERPL-MCNC: 3.9 MMOL/L — SIGNIFICANT CHANGE UP (ref 3.5–5.3)
POTASSIUM SERPL-SCNC: 3.8 MMOL/L — SIGNIFICANT CHANGE UP (ref 3.5–5.3)
POTASSIUM SERPL-SCNC: 3.8 MMOL/L — SIGNIFICANT CHANGE UP (ref 3.5–5.3)
POTASSIUM SERPL-SCNC: 3.9 MMOL/L — SIGNIFICANT CHANGE UP (ref 3.5–5.3)
POTASSIUM SERPL-SCNC: 3.9 MMOL/L — SIGNIFICANT CHANGE UP (ref 3.5–5.3)
PROT PATTERN SERPL ELPH-IMP: SIGNIFICANT CHANGE UP
PROT PATTERN SERPL ELPH-IMP: SIGNIFICANT CHANGE UP
RBC # BLD: 3.68 M/UL — LOW (ref 4.2–5.8)
RBC # BLD: 3.68 M/UL — LOW (ref 4.2–5.8)
RBC # FLD: 14.2 % — SIGNIFICANT CHANGE UP (ref 10.3–14.5)
RBC # FLD: 14.2 % — SIGNIFICANT CHANGE UP (ref 10.3–14.5)
SODIUM SERPL-SCNC: 148 MMOL/L — HIGH (ref 135–145)
SODIUM SERPL-SCNC: 148 MMOL/L — HIGH (ref 135–145)
SODIUM SERPL-SCNC: 153 MMOL/L — HIGH (ref 135–145)
SODIUM SERPL-SCNC: 153 MMOL/L — HIGH (ref 135–145)
SODIUM UR-SCNC: 46 MMOL/L — SIGNIFICANT CHANGE UP
SODIUM UR-SCNC: 46 MMOL/L — SIGNIFICANT CHANGE UP
WBC # BLD: 16.06 K/UL — HIGH (ref 3.8–10.5)
WBC # BLD: 16.06 K/UL — HIGH (ref 3.8–10.5)
WBC # FLD AUTO: 16.06 K/UL — HIGH (ref 3.8–10.5)
WBC # FLD AUTO: 16.06 K/UL — HIGH (ref 3.8–10.5)

## 2023-10-25 PROCEDURE — 99233 SBSQ HOSP IP/OBS HIGH 50: CPT | Mod: FS

## 2023-10-25 RX ORDER — DEXTROSE 50 % IN WATER 50 %
12.5 SYRINGE (ML) INTRAVENOUS ONCE
Refills: 0 | Status: DISCONTINUED | OUTPATIENT
Start: 2023-10-25 | End: 2023-10-31

## 2023-10-25 RX ORDER — AMLODIPINE BESYLATE 2.5 MG/1
5 TABLET ORAL DAILY
Refills: 0 | Status: DISCONTINUED | OUTPATIENT
Start: 2023-10-25 | End: 2023-10-29

## 2023-10-25 RX ORDER — DEXTROSE 50 % IN WATER 50 %
25 SYRINGE (ML) INTRAVENOUS ONCE
Refills: 0 | Status: DISCONTINUED | OUTPATIENT
Start: 2023-10-25 | End: 2023-10-31

## 2023-10-25 RX ORDER — INSULIN LISPRO 100/ML
VIAL (ML) SUBCUTANEOUS
Refills: 0 | Status: DISCONTINUED | OUTPATIENT
Start: 2023-10-25 | End: 2023-10-29

## 2023-10-25 RX ORDER — INSULIN LISPRO 100/ML
VIAL (ML) SUBCUTANEOUS AT BEDTIME
Refills: 0 | Status: DISCONTINUED | OUTPATIENT
Start: 2023-10-25 | End: 2023-10-29

## 2023-10-25 RX ORDER — HEPARIN SODIUM 5000 [USP'U]/ML
5000 INJECTION INTRAVENOUS; SUBCUTANEOUS EVERY 8 HOURS
Refills: 0 | Status: DISCONTINUED | OUTPATIENT
Start: 2023-10-25 | End: 2023-10-31

## 2023-10-25 RX ORDER — DEXTROSE 50 % IN WATER 50 %
15 SYRINGE (ML) INTRAVENOUS ONCE
Refills: 0 | Status: DISCONTINUED | OUTPATIENT
Start: 2023-10-25 | End: 2023-10-31

## 2023-10-25 RX ORDER — GLUCAGON INJECTION, SOLUTION 0.5 MG/.1ML
1 INJECTION, SOLUTION SUBCUTANEOUS ONCE
Refills: 0 | Status: DISCONTINUED | OUTPATIENT
Start: 2023-10-25 | End: 2023-10-31

## 2023-10-25 RX ORDER — SODIUM CHLORIDE 9 MG/ML
1000 INJECTION, SOLUTION INTRAVENOUS
Refills: 0 | Status: DISCONTINUED | OUTPATIENT
Start: 2023-10-25 | End: 2023-10-28

## 2023-10-25 RX ORDER — HYDRALAZINE HCL 50 MG
10 TABLET ORAL ONCE
Refills: 0 | Status: COMPLETED | OUTPATIENT
Start: 2023-10-25 | End: 2023-10-25

## 2023-10-25 RX ORDER — SODIUM CHLORIDE 9 MG/ML
1000 INJECTION, SOLUTION INTRAVENOUS
Refills: 0 | Status: DISCONTINUED | OUTPATIENT
Start: 2023-10-25 | End: 2023-10-26

## 2023-10-25 RX ORDER — PANTOPRAZOLE SODIUM 20 MG/1
40 TABLET, DELAYED RELEASE ORAL
Refills: 0 | Status: DISCONTINUED | OUTPATIENT
Start: 2023-10-25 | End: 2023-10-31

## 2023-10-25 RX ADMIN — Medication 1: at 22:34

## 2023-10-25 RX ADMIN — AMLODIPINE BESYLATE 5 MILLIGRAM(S): 2.5 TABLET ORAL at 14:36

## 2023-10-25 RX ADMIN — PIPERACILLIN AND TAZOBACTAM 25 GRAM(S): 4; .5 INJECTION, POWDER, LYOPHILIZED, FOR SOLUTION INTRAVENOUS at 12:36

## 2023-10-25 RX ADMIN — Medication 10 MILLIGRAM(S): at 06:57

## 2023-10-25 RX ADMIN — HEPARIN SODIUM 5000 UNIT(S): 5000 INJECTION INTRAVENOUS; SUBCUTANEOUS at 22:36

## 2023-10-25 RX ADMIN — POTASSIUM PHOSPHATE, MONOBASIC POTASSIUM PHOSPHATE, DIBASIC 62.5 MILLIMOLE(S): 236; 224 INJECTION, SOLUTION INTRAVENOUS at 01:02

## 2023-10-25 RX ADMIN — Medication 5: at 11:53

## 2023-10-25 RX ADMIN — Medication 2: at 17:23

## 2023-10-25 RX ADMIN — SODIUM CHLORIDE 200 MILLILITER(S): 9 INJECTION, SOLUTION INTRAVENOUS at 22:38

## 2023-10-25 RX ADMIN — Medication 3: at 00:06

## 2023-10-25 RX ADMIN — Medication 100 MILLIEQUIVALENT(S): at 00:02

## 2023-10-25 RX ADMIN — SODIUM CHLORIDE 200 MILLILITER(S): 9 INJECTION, SOLUTION INTRAVENOUS at 09:48

## 2023-10-25 RX ADMIN — PIPERACILLIN AND TAZOBACTAM 25 GRAM(S): 4; .5 INJECTION, POWDER, LYOPHILIZED, FOR SOLUTION INTRAVENOUS at 23:44

## 2023-10-25 RX ADMIN — Medication 4: at 06:19

## 2023-10-25 NOTE — PROGRESS NOTE ADULT - NS ATTEND AMEND GEN_ALL_CORE FT
78M, from home, ambulates with cane, [facility] BPH presents after being found down. Daughter found patient on floor at home, noted with slurred speech and AMS. Of note, was taking cirpo for UTI. Pt reports that previous to this episode, he was suffering from urinary retention for several days and straining to urinate. On presentation, patient was slightly hypotensive, tachypneic, with kidney failure, elevated lactate, and found w large bowl obstruction from inguinal hernia. Admission to ICU was for shock, suspected sepsis 2/2 UTI. CCB AF RVR. Started on zosyn and briefly on pressors. Underwent emergent HD for renal failure. Encephalopathy improved, hemodynamically stable, and hernia was manually reduced, NGT removed, and started on diet. Pt downgraded to medicine.     AP:  TME, resolved  Shock, possibly sepsis, resolved  pyuria  Acute kidney failure, ATN, suspect 2/2 shock  urinary retention, BPH  hematuria, suspect 2/2 AC and trauma  lactic acidosis from shock, kidney failure, metformin use  hypernatremia, from polyuric phase of ATN  large bowel obstruction, inguinal hernia, manually reduced  symptomatic AF with RVR, in response to shock, resolved  HTN  DM2  hyperglycemia    -TME and shock resolved  -treating shock empirically with zoysyn, UCx on admission with no growth  -BCx with no growth  -f/u ID recs  -f/u nephro recs  -strict I/O, c/w D5 for hypernatremia, trend BMP  -failed TOV  -hernia reduced, advance diet as tolerated  -c/w home flomax and finasteride  -if urine still bloody after bag change, consult urology  -PT eval  -dvt ppx 78M, from home, ambulates with cane, [facility] BPH presents after being found down. Daughter found patient on floor at home, noted with slurred speech and AMS. Of note, was taking cirpo for UTI. Pt reports that previous to this episode, he was suffering from urinary retention for several days and straining to urinate. On presentation, patient was slightly hypotensive, tachypneic, with kidney failure, elevated lactate, and found w large bowl obstruction from inguinal hernia. Admission to ICU was for shock, suspected sepsis 2/2 UTI. CCB AF RVR. Started on zosyn and briefly on pressors. Underwent emergent HD for renal failure. Encephalopathy improved, hemodynamically stable, and hernia was manually reduced, NGT removed, and started on diet. Pt downgraded to medicine.     AP:  TME, resolved  Shock, possibly sepsis, resolved  pyuria  Acute kidney failure, ATN, suspect 2/2 shock  urinary retention, BPH  hematuria, suspect 2/2 AC and trauma  lactic acidosis from shock, kidney failure, metformin use  hypernatremia, from polyuric phase of ATN  large bowel obstruction, inguinal hernia, manually reduced  symptomatic AF with RVR, in response to shock, resolved  HTN  DM2  hyperglycemia    -TME and shock resolved  -treating shock empirically with zoysyn, UCx on admission with no growth  -BCx with no growth  -f/u ID recs  -f/u nephro recs  -strict I/O, c/w D5 for hypernatremia, trend BMP  -failed TOV  -hernia reduced, advance diet as tolerated  -c/w home flomax and finasteride  -if urine still bloody after bag change, consult urology  -PT eval  -dvt ppx    Ordered labs: CBC, BMP  Reviewed labs: CBC, BMP  Discussed management with: Dr Alicea, Dr. Bravo                          10.5   16.06 )-----------( 257      ( 25 Oct 2023 05:30 )             32.9       10-25    153<H>  |  115<H>  |  48<H>  ----------------------------<  331<H>  3.8   |  34<H>  |  1.96<H>    Ca    8.8      25 Oct 2023 05:30  Phos  2.7     10-25  Mg     1.8     10-25

## 2023-10-25 NOTE — DIETITIAN INITIAL EVALUATION ADULT - PERTINENT LABORATORY DATA
10-25    153<H>  |  115<H>  |  48<H>  ----------------------------<  331<H>  3.8   |  34<H>  |  1.96<H>    Ca    8.8      25 Oct 2023 05:30  Phos  2.7     10-25  Mg     1.8     10-25    POCT Blood Glucose.: 353 mg/dL (10-25-23 @ 11:31)  A1C with Estimated Average Glucose Result: 5.9 % (10-22-23 @ 14:20)

## 2023-10-25 NOTE — PROGRESS NOTE ADULT - SUBJECTIVE AND OBJECTIVE BOX
FRANCISCOROSA ANITA  78y  Patient is a 78y old  Male who presents with a chief complaint of Acute renal failure     (25 Oct 2023 13:03)    HPI:  This is a patient with AKIN.  He feels better today, transferred from ICU.    HEALTH ISSUES - PROBLEM Dx:  Metabolic encephalopathy    AKIN (acute kidney injury)    H/O ventral hernia repair    Recurrent ventral hernia    Hypernatremia    HTN (hypertension)    DM (diabetes mellitus)    Afib    Prophylactic measure          MEDICATIONS  (STANDING):  amLODIPine   Tablet 5 milliGRAM(s) Oral daily  dextrose 5%. 1000 milliLiter(s) (200 mL/Hr) IV Continuous <Continuous>  dextrose 5%. 1000 milliLiter(s) (50 mL/Hr) IV Continuous <Continuous>  dextrose 5%. 1000 milliLiter(s) (100 mL/Hr) IV Continuous <Continuous>  dextrose 50% Injectable 12.5 Gram(s) IV Push once  dextrose 50% Injectable 25 Gram(s) IV Push once  dextrose 50% Injectable 25 Gram(s) IV Push once  glucagon  Injectable 1 milliGRAM(s) IntraMuscular once  heparin   Injectable 5000 Unit(s) SubCutaneous every 8 hours  insulin lispro (ADMELOG) corrective regimen sliding scale   SubCutaneous three times a day before meals  insulin lispro (ADMELOG) corrective regimen sliding scale   SubCutaneous at bedtime  pantoprazole    Tablet 40 milliGRAM(s) Oral before breakfast  piperacillin/tazobactam IVPB.. 3.375 Gram(s) IV Intermittent every 12 hours    MEDICATIONS  (PRN):  dextrose Oral Gel 15 Gram(s) Oral once PRN Blood Glucose LESS THAN 70 milliGRAM(s)/deciliter  sodium chloride 0.9% Bolus. 100 milliLiter(s) IV Bolus every 5 minutes PRN SBP LESS THAN or EQUAL to 90 mmHg  sodium chloride 0.9% lock flush 10 milliLiter(s) IV Push every 1 hour PRN Pre/post blood products, medications, blood draw, and to maintain line patency    Vital Signs Last 24 Hrs  T(C): 36.3 (25 Oct 2023 13:21), Max: 37 (24 Oct 2023 22:00)  T(F): 97.4 (25 Oct 2023 13:21), Max: 98.6 (24 Oct 2023 22:00)  HR: 89 (25 Oct 2023 14:32) (72 - 92)  BP: 117/68 (25 Oct 2023 14:32) (106/79 - 179/77)  BP(mean): --  RR: 18 (25 Oct 2023 13:21) (18 - 18)  SpO2: 98% (25 Oct 2023 13:21) (92% - 98%)    Parameters below as of 25 Oct 2023 13:21  Patient On (Oxygen Delivery Method): room air      Daily     Daily     PHYSICAL EXAM:  Constitutional:  He appears comfortable and not distressed. Not diaphoretic.    Neck:  The thyroid is normal. Trachea is midline.     Breasts: Normal examination.    Respiratory: The lungs are clear to auscultation. No dullness and expansion is normal.    Cardiovascular: S1 and S2 are normal. No murmurs, rubs or gallops are present.    Gastrointestinal: The abdomen is soft. No tenderness is present. No masses are present. Bowel sounds are normal.    Genitourinary: The bladder is not distended. No CVA tenderness is present.    Extremities: No edema is noted. No deformities are present.    Neurological: Cognition is normal. Tone, power and sensation are normal. Gait is steady.    Skin: No lesions are seen  or palpated.    Lymph Nodes: No lymphadenopathy is present.    Psychiatric: Mood is appropriate. No hallucinations or flight of ideas are noted.                              10.5   16.06 )-----------( 257      ( 25 Oct 2023 05:30 )             32.9     10-25    153<H>  |  115<H>  |  48<H>  ----------------------------<  331<H>  3.8   |  34<H>  |  1.96<H>    Ca    8.8      25 Oct 2023 05:30  Phos  2.7     10-25  Mg     1.8     10-25      Urinalysis Basic - ( 25 Oct 2023 05:30 )    Color: x / Appearance: x / SG: x / pH: x  Gluc: 331 mg/dL / Ketone: x  / Bili: x / Urobili: x   Blood: x / Protein: x / Nitrite: x   Leuk Esterase: x / RBC: x / WBC x   Sq Epi: x / Non Sq Epi: x / Bacteria: x

## 2023-10-25 NOTE — DIETITIAN INITIAL EVALUATION ADULT - PERTINENT MEDS FT
MEDICATIONS  (STANDING):  amLODIPine   Tablet 5 milliGRAM(s) Oral daily  dextrose 5%. 1000 milliLiter(s) (50 mL/Hr) IV Continuous <Continuous>  dextrose 5%. 1000 milliLiter(s) (100 mL/Hr) IV Continuous <Continuous>  dextrose 5%. 1000 milliLiter(s) (200 mL/Hr) IV Continuous <Continuous>  dextrose 50% Injectable 25 Gram(s) IV Push once  dextrose 50% Injectable 12.5 Gram(s) IV Push once  dextrose 50% Injectable 25 Gram(s) IV Push once  glucagon  Injectable 1 milliGRAM(s) IntraMuscular once  heparin   Injectable 5000 Unit(s) SubCutaneous every 8 hours  insulin lispro (ADMELOG) corrective regimen sliding scale   SubCutaneous three times a day before meals  insulin lispro (ADMELOG) corrective regimen sliding scale   SubCutaneous at bedtime  pantoprazole    Tablet 40 milliGRAM(s) Oral before breakfast  piperacillin/tazobactam IVPB.. 3.375 Gram(s) IV Intermittent every 12 hours    MEDICATIONS  (PRN):  dextrose Oral Gel 15 Gram(s) Oral once PRN Blood Glucose LESS THAN 70 milliGRAM(s)/deciliter  sodium chloride 0.9% Bolus. 100 milliLiter(s) IV Bolus every 5 minutes PRN SBP LESS THAN or EQUAL to 90 mmHg  sodium chloride 0.9% lock flush 10 milliLiter(s) IV Push every 1 hour PRN Pre/post blood products, medications, blood draw, and to maintain line patency

## 2023-10-25 NOTE — PROGRESS NOTE ADULT - ASSESSMENT
AKIN:  Likely ATN from sepsis. No evidence of obstruction.  S/P HD on 10/22. He is voiding > 70 ml/hour.  - Pressor support, targeting MAP > 65.  - Monitor BMP.  - Avoid nephrotoxins.  - Will hold HD as he is in recovery.  - Remove temporary HD catheter.        Metabolic Alkalosis:  Initially, he had High AG Metabolic Acidosis, likely Lactic Acidosis form septic shock.   This has resolved and he is now Alkalotic. Possible due to volume contraction or wasting of Anions in the urine.  - Follow up BMP.    Hypernatremia:  Due to volume contraction. He has post AKIN polyuria, likely hypoosmotic.  - Send urine OSM.  - Hypotonic fluids.        Hypokalemia:  - Supplement as needed.    Incarcerated Hernia:  - Surgery follow up

## 2023-10-25 NOTE — PROGRESS NOTE ADULT - SUBJECTIVE AND OBJECTIVE BOX
Patient is a 78y old  Male who presents with a chief complaint of acute renal failure (24 Oct 2023 18:46)      INTERVAL HPI/OVERNIGHT EVENTS: pt overnight was reattaining urine, , s/p guerra now       REVIEW OF SYSTEMS:  CONSTITUTIONAL: No fever, chills  ENMT:  No difficulty hearing, no change in vision  NECK: No pain or stiffness  RESPIRATORY: No cough, SOB  CARDIOVASCULAR: No chest pain, palpitations  GASTROINTESTINAL: No abdominal pain. No nausea, vomiting, or diarrhea  GENITOURINARY: No dysuria  NEUROLOGICAL: No HA  SKIN: No itching, burning, rashes, or lesions   LYMPH NODES: No enlarged glands  ENDOCRINE: No heat or cold intolerance; No hair loss  MUSCULOSKELETAL: No joint pain or swelling; No muscle, back, or extremity pain  PSYCHIATRIC: No depression, anxiety  HEME/LYMPH: No easy bruising, or bleeding gums    T(C): 36.8 (10-25-23 @ 05:00), Max: 37 (10-24-23 @ 22:00)  HR: 86 (10-25-23 @ 07:10) (72 - 99)  BP: 159/54 (10-25-23 @ 07:10) (106/79 - 179/77)  RR: 18 (10-25-23 @ 05:00) (14 - 23)  SpO2: 92% (10-25-23 @ 05:00) (92% - 98%)  Wt(kg): --Vital Signs Last 24 Hrs  T(C): 36.8 (25 Oct 2023 05:00), Max: 37 (24 Oct 2023 22:00)  T(F): 98.2 (25 Oct 2023 05:00), Max: 98.6 (24 Oct 2023 22:00)  HR: 86 (25 Oct 2023 07:10) (72 - 99)  BP: 159/54 (25 Oct 2023 07:10) (106/79 - 179/77)  BP(mean): 99 (24 Oct 2023 16:00) (92 - 108)  RR: 18 (25 Oct 2023 05:00) (14 - 23)  SpO2: 92% (25 Oct 2023 05:00) (92% - 98%)    Parameters below as of 25 Oct 2023 05:00  Patient On (Oxygen Delivery Method): room air    MEDICATIONS  (STANDING):  dextrose 5%. 1000 milliLiter(s) (200 mL/Hr) IV Continuous <Continuous>  insulin lispro (ADMELOG) corrective regimen sliding scale   SubCutaneous every 6 hours  piperacillin/tazobactam IVPB.. 3.375 Gram(s) IV Intermittent every 12 hours    MEDICATIONS  (PRN):  sodium chloride 0.9% Bolus. 100 milliLiter(s) IV Bolus every 5 minutes PRN SBP LESS THAN or EQUAL to 90 mmHg  sodium chloride 0.9% lock flush 10 milliLiter(s) IV Push every 1 hour PRN Pre/post blood products, medications, blood draw, and to maintain line patency      PHYSICAL EXAM:  GENERAL: NAD  EYES: clear conjunctiva; EOMI  ENMT: Moist mucous membranes  NECK: Supple, No JVD, Normal thyroid  CHEST/LUNG: Clear to auscultation bilaterally; No rales, rhonchi, wheezing, or rubs  HEART: S1, S2, Regular rate and rhythm  ABDOMEN: Soft, Nontender, Nondistended; Bowel sounds present  NEURO: Alert & Oriented X3  EXTREMITIES: No LE edema, no calf tenderness  LYMPH: No lymphadenopathy noted  SKIN: No rashes or lesions  : Guerra clear yellow     Consultant(s) Notes Reviewed:  [x ] YES  [ ] NO  Care Discussed with Consultants/Other Providers [ x] YES  [ ] NO    LABS:                        10.5   16.06 )-----------( 257      ( 25 Oct 2023 05:30 )             32.9     10-25    153<H>  |  115<H>  |  48<H>  ----------------------------<  331<H>  3.8   |  34<H>  |  1.96<H>    Ca    8.8      25 Oct 2023 05:30  Phos  2.7     10-25  Mg     1.8     10-25      PT/INR - ( 23 Oct 2023 18:10 )   PT: 15.3 sec;   INR: 1.35 ratio         PTT - ( 23 Oct 2023 18:10 )  PTT:30.7 sec  CAPILLARY BLOOD GLUCOSE      POCT Blood Glucose.: 345 mg/dL (25 Oct 2023 06:15)  POCT Blood Glucose.: 279 mg/dL (24 Oct 2023 23:46)  POCT Blood Glucose.: 314 mg/dL (24 Oct 2023 17:38)  POCT Blood Glucose.: 197 mg/dL (24 Oct 2023 11:14)      ABG - ( 24 Oct 2023 18:53 )  pH, Arterial: 7.62  pH, Blood: x     /  pCO2: 39    /  pO2: 78    / HCO3: 40    / Base Excess: 17.2  /  SaO2: 98            Urinalysis Basic - ( 25 Oct 2023 05:30 )    Color: x / Appearance: x / SG: x / pH: x  Gluc: 331 mg/dL / Ketone: x  / Bili: x / Urobili: x   Blood: x / Protein: x / Nitrite: x   Leuk Esterase: x / RBC: x / WBC x   Sq Epi: x / Non Sq Epi: x / Bacteria: x        RADIOLOGY & ADDITIONAL TESTS:    Imaging Personally Reviewed:  [x ] YES  [ ] NO  < from: Xray Abdomen 1 View PORTABLE -Urgent (Xray Abdomen 1 View PORTABLE -Urgent .) (10.24.23 @ 11:49) >    ACC: [id number] EXAM:  XR ABDOMEN PORTABLE URGENT 1V   ORDERED BY: CHRISTOPHER KENT     PROCEDURE DATE:  10/24/2023          INTERPRETATION:  HISTORY:  NG tube placement    TECHNIQUE:  A view the upper abdomen and lower chest was obtained.    FINDINGS:  An NG tube is seen in the stomach. The lung bases are clear.   There is no evidence of bowel obstruction. Air is seen in the colon.   There is no gross free air.    IMPRESSION:  NG tube in the stomach.    --- End of Report ---            PASHA DICKINSON MD; Attending Radiologist  This document has been electronically signed. Oct 24 2023  4:08PM    < end of copied text >

## 2023-10-25 NOTE — DIETITIAN INITIAL EVALUATION ADULT - OTHER INFO
.Pt visited. Pt OOb to chair. Pt Reports Good appetite. Per Pt allergic to strawberry's . D/W F & n dept. Per Pt He  has lost some weight Intentionally. Labs noted A1c 5.9.  Per Pt Ht 5 feet 10 inches. Current wt 167 LB. NG  tube Removed . Pt with Large Bowel Obstruction Resolved. . Pt Received One time  Dialysis. Pt with AKIN. Per Pt he takes Oral Hypoglycemic agents at Home.

## 2023-10-25 NOTE — CHART NOTE - NSCHARTNOTEFT_GEN_A_CORE
I have made amendments to the documentation where necessary. Additional comments: 78M, from home, ambulates with cane, [facility] BPH presents after being found down. Daughter found patient on floor at home, noted with slurred speech and AMS. Of note, was taking cirpo for UTI. Pt reports that previous to this episode, he was suffering from urinary retention for several days and straining to urinate. On presentation, patient was slightly hypotensive, tachypneic, with kidney failure, elevated lactate, and found w large bowl obstruction from inguinal hernia. Admission to ICU was for shock, suspected sepsis 2/2 UTI. CCB AF RVR. Started on zosyn and briefly on pressors. Underwent emergent HD for renal failure. Encephalopathy improved, hemodynamically stable, and hernia was manually reduced, NGT removed, and started on diet. Pt downgraded to medicine.     AP:  TME, resolved  Shock, possibly sepsis, resolved  pyuria  Acute kidney failure, ATN, suspect 2/2 shock  urinary retention, BPH  hematuria, suspect 2/2 AC and trauma  lactic acidosis from shock, kidney failure, metformin use  hypernatremia, from polyuric phase of ATN  large bowel obstruction, inguinal hernia, manually reduced  symptomatic AF with RVR, in response to shock, resolved  HTN  DM2  hyperglycemia    -TME and shock resolved  -treating shock empirically with zoysyn, UCx on admission with no growth  -BCx with no growth  -f/u ID recs  -f/u nephro recs  -strict I/O, c/w D5 for hypernatremia, trend BMP  -failed TOV  -hernia reduced, advance diet as tolerated  -c/w home flomax and finasteride  -if urine still bloody after bag change, consult urology  -PT eval  -dvt ppx    Ordered labs: CBC, BMP  Reviewed labs: CBC, BMP  Discussed management with: Dr Alicea, Dr. Bravo                          10.5   16.06 )-----------( 257      ( 25 Oct 2023 05:30 )             32.9       10-25    153<H>  |  115<H>  |  48<H>  ----------------------------<  331<H>  3.8   |  34<H>  |  1.96<H>    Ca    8.8      25 Oct 2023 05:30  Phos  2.7     10-25  Mg     1.8     10-25.

## 2023-10-25 NOTE — PROGRESS NOTE ADULT - ASSESSMENT
78y.o. Male with stable L inguinal hernia, resolved LBO    -Advance diet as tolerated  -Observation  -Rest of care per AI team    This note and its recommendations herein are preliminary until such time as cosigned by an attending.

## 2023-10-25 NOTE — DIETITIAN INITIAL EVALUATION ADULT - NSFNSGIIOFT_GEN_A_CORE
10-24-23 @ 07:01  -  10-25-23 @ 07:00  --------------------------------------------------------  OUT:    Nasogastric/Oral tube (mL): 700 mL  Total OUT: 700 mL    Total NET: -700 mL

## 2023-10-25 NOTE — PROGRESS NOTE ADULT - SUBJECTIVE AND OBJECTIVE BOX
INTERVAL HPI/OVERNIGHT EVENTS:  Pt resting comfortably. No acute complaints.   NGT removed.  NPO.  +flatus/-BM.   Denies N/V.    MEDICATIONS  (STANDING):  dextrose 5%. 1000 milliLiter(s) (200 mL/Hr) IV Continuous <Continuous>  insulin lispro (ADMELOG) corrective regimen sliding scale   SubCutaneous every 6 hours  piperacillin/tazobactam IVPB.. 3.375 Gram(s) IV Intermittent every 12 hours    MEDICATIONS  (PRN):  sodium chloride 0.9% Bolus. 100 milliLiter(s) IV Bolus every 5 minutes PRN SBP LESS THAN or EQUAL to 90 mmHg  sodium chloride 0.9% lock flush 10 milliLiter(s) IV Push every 1 hour PRN Pre/post blood products, medications, blood draw, and to maintain line patency    Vital Signs Last 24 Hrs  T(C): 36.8 (25 Oct 2023 05:00), Max: 37 (24 Oct 2023 22:00)  T(F): 98.2 (25 Oct 2023 05:00), Max: 98.6 (24 Oct 2023 22:00)  HR: 86 (25 Oct 2023 07:10) (72 - 102)  BP: 159/54 (25 Oct 2023 07:10) (106/79 - 179/77)  BP(mean): 99 (24 Oct 2023 16:00) (92 - 108)  RR: 18 (25 Oct 2023 05:00) (14 - 23)  SpO2: 92% (25 Oct 2023 05:00) (92% - 98%)    Parameters below as of 25 Oct 2023 05:00  Patient On (Oxygen Delivery Method): room air    Physical:  General: A&Ox3. NAD.  Abdomen: Soft nondistended, nontender.  Pelvis: Stable L inguinal hernia.    I&O's Detail    24 Oct 2023 07:01  -  25 Oct 2023 07:00  --------------------------------------------------------  IN:    dextrose 5%: 800 mL    dextrose 5% + lactated ringers: 800 mL    IV PiggyBack: 100 mL    Lactated Ringers: 600 mL  Total IN: 2300 mL    OUT:    Indwelling Catheter - Urethral (mL): 1650 mL    Intermittent Catheterization - Urethral (mL): 1300 mL    Nasogastric/Oral tube (mL): 700 mL  Total OUT: 3650 mL    Total NET: -1350 mL      25 Oct 2023 07:01  -  25 Oct 2023 09:30  --------------------------------------------------------  IN:  Total IN: 0 mL    OUT:    Indwelling Catheter - Urethral (mL): 750 mL  Total OUT: 750 mL    Total NET: -750 mL    LABS:                        10.5   16.06 )-----------( 257      ( 25 Oct 2023 05:30 )             32.9             10-25    153<H>  |  115<H>  |  48<H>  ----------------------------<  331<H>  3.8   |  34<H>  |  1.96<H>    Ca    8.8      25 Oct 2023 05:30  Phos  2.7     10-25  Mg     1.8     10-25

## 2023-10-25 NOTE — PROGRESS NOTE ADULT - ASSESSMENT
Patient is a 79 y/o M, lives alone at home, ambulates w/ a cane. He has PMHx of HTN, DM, BPH. Admitted for acute renal failure, s/p dialysis x1      #Acute Metabolic Encephalopathy  - Resolved  - Currently at baseline AAOx3  - CTA H/N - neg CVA  - UTox - neg  - likely 2/2 to septic shock 2/2 to large bowel obstruction.     _________CVS___________    #Septic Shock (resolved)  - Likely 2/2 to large bowel obstruction vs UTI.   - P/w elevated WBC 19, lactate 3.8 and hypotension  - Lactate 3.8>7.5>1.5  - treated for UTI w/ Cipro outpatient for 5 days.   - UA+  - CT abdomen/pelvis: Large left inguinal hernia containing colon. There is dilatation of the colon proximal to the hernia suggestive of large bowel obstruction.  - C/w Zosyn (day 3 of total 7 days)  - D/C levophed and vasopressin.   - Bcx - NGTD  - ID (Dr. Bravo) consulted    #Hypertension  - home meds - amlodipine, benazepril  - BP monitoring  - hold benazepril in the setting of AKIN  - hold amlodipine until NPO  - Can give PRN pushes of labetalol for acutely elevated BP until NPO.     #New onset Atrial fibrillation w/ RVR (resolved)  - Likely in setting of sepsis.   - Started on amiodarone and heparin. Now D/C as patient converted to sinus rhythm.   - EKG 10/23/23 - EKG: NSR    _________ RESP__________    #no issues    __________GI____________    #Incarcerated Ventral Hernia  #Large Bowel Obstruction  - CT Abd: Large left inguinal hernia containing colon. There is dilatation of the colon proximal to the hernia suggestive of large bowel obstruction.  - NPO for now  - NGT to suction - until reduced output.   - Surgery consulted - successful manual reduction     ________ RENAL__________    #Acute Renal Failure  - Cr - 12.6>5>>3.17 (baseline) - 0.96  - FeNa - 3.2 (intrinsic)  - S/P HD on 10/23  - Now likely in polyuric phase of ATN, Urine output 4L past 24 hours 10/24/23.   - ESR - 27, CRP - 109  - HIV - negative, Hepatitis panel - negative,   - F/U GEMMA, ANCA, C3, SPEP/UPEP  - monitor BMP Q4  - C/w standing D5-LR @100 to replete volume loss from renal vs GI.   - avoid nephrotoxic agents    #Metabolic Acidosis  - Resolved.  - in setting of lactic acidosis.   - Lactate - 3.8>1..5  - monitor ABG      #Hyperkalemia (resolved)  - K - 6.3>3.6  - EKG - NSR, TWI on anterior leads  - Trop - neg  - s/p Ca Gluc + hyperkalemia cocktail (insulin/dextrose + lokelma)  - monitor BMP    #Hypernatremia:  - Na 143>>157  - Likely 2/2 to hypovolemia in setting of polyuric phase.   - Replete with D5+LR.   - F/U BMP Q4  - Avoid over correction > 8meQ.    #BPH  - home meds - tamsulosin, finasteride  - hold home meds while NPO    _________MSK___________    #Elevated CPK  - 466  - continue IVF hydration    __________ID____________    #Septic Shock  - see above    _________ENDO__________    #DM  - home meds - metformin, glipizide  - hold PO meds  - start insulin sliding scale q6 while NPO  - Accu Cheks Q6  - f/u a1c    _______HEME/ONC_______    #no issues    _________SKIN____________    - L Ext dwell 10/22/2023  - R shiley 10/22/2023  - LIJ 10/22/2023  - R. axillary arterial line 10/22/2023    ________Prophylaxis_______    #DVT - heparin SQ      Patient is a 77 y/o M, lives alone at home, ambulates w/ a cane. He has PMHx of HTN, DM, BPH. Admitted for acute renal failure, s/p dialysis x1, nephro following.

## 2023-10-26 LAB
ANION GAP SERPL CALC-SCNC: 5 MMOL/L — SIGNIFICANT CHANGE UP (ref 5–17)
ANION GAP SERPL CALC-SCNC: 5 MMOL/L — SIGNIFICANT CHANGE UP (ref 5–17)
BUN SERPL-MCNC: 36 MG/DL — HIGH (ref 7–18)
BUN SERPL-MCNC: 36 MG/DL — HIGH (ref 7–18)
CALCIUM SERPL-MCNC: 7.9 MG/DL — LOW (ref 8.4–10.5)
CALCIUM SERPL-MCNC: 7.9 MG/DL — LOW (ref 8.4–10.5)
CHLORIDE SERPL-SCNC: 109 MMOL/L — HIGH (ref 96–108)
CHLORIDE SERPL-SCNC: 109 MMOL/L — HIGH (ref 96–108)
CO2 SERPL-SCNC: 29 MMOL/L — SIGNIFICANT CHANGE UP (ref 22–31)
CO2 SERPL-SCNC: 29 MMOL/L — SIGNIFICANT CHANGE UP (ref 22–31)
CREAT SERPL-MCNC: 1.65 MG/DL — HIGH (ref 0.5–1.3)
CREAT SERPL-MCNC: 1.65 MG/DL — HIGH (ref 0.5–1.3)
EGFR: 42 ML/MIN/1.73M2 — LOW
EGFR: 42 ML/MIN/1.73M2 — LOW
GLUCOSE BLDC GLUCOMTR-MCNC: 223 MG/DL — HIGH (ref 70–99)
GLUCOSE BLDC GLUCOMTR-MCNC: 223 MG/DL — HIGH (ref 70–99)
GLUCOSE BLDC GLUCOMTR-MCNC: 233 MG/DL — HIGH (ref 70–99)
GLUCOSE BLDC GLUCOMTR-MCNC: 233 MG/DL — HIGH (ref 70–99)
GLUCOSE BLDC GLUCOMTR-MCNC: 284 MG/DL — HIGH (ref 70–99)
GLUCOSE BLDC GLUCOMTR-MCNC: 284 MG/DL — HIGH (ref 70–99)
GLUCOSE BLDC GLUCOMTR-MCNC: 305 MG/DL — HIGH (ref 70–99)
GLUCOSE BLDC GLUCOMTR-MCNC: 305 MG/DL — HIGH (ref 70–99)
GLUCOSE BLDC GLUCOMTR-MCNC: 317 MG/DL — HIGH (ref 70–99)
GLUCOSE BLDC GLUCOMTR-MCNC: 317 MG/DL — HIGH (ref 70–99)
GLUCOSE SERPL-MCNC: 294 MG/DL — HIGH (ref 70–99)
GLUCOSE SERPL-MCNC: 294 MG/DL — HIGH (ref 70–99)
HCT VFR BLD CALC: 28.6 % — LOW (ref 39–50)
HCT VFR BLD CALC: 28.6 % — LOW (ref 39–50)
HGB BLD-MCNC: 9.2 G/DL — LOW (ref 13–17)
HGB BLD-MCNC: 9.2 G/DL — LOW (ref 13–17)
MCHC RBC-ENTMCNC: 28.9 PG — SIGNIFICANT CHANGE UP (ref 27–34)
MCHC RBC-ENTMCNC: 28.9 PG — SIGNIFICANT CHANGE UP (ref 27–34)
MCHC RBC-ENTMCNC: 32.2 GM/DL — SIGNIFICANT CHANGE UP (ref 32–36)
MCHC RBC-ENTMCNC: 32.2 GM/DL — SIGNIFICANT CHANGE UP (ref 32–36)
MCV RBC AUTO: 89.9 FL — SIGNIFICANT CHANGE UP (ref 80–100)
MCV RBC AUTO: 89.9 FL — SIGNIFICANT CHANGE UP (ref 80–100)
NRBC # BLD: 0 /100 WBCS — SIGNIFICANT CHANGE UP (ref 0–0)
NRBC # BLD: 0 /100 WBCS — SIGNIFICANT CHANGE UP (ref 0–0)
PLATELET # BLD AUTO: 192 K/UL — SIGNIFICANT CHANGE UP (ref 150–400)
PLATELET # BLD AUTO: 192 K/UL — SIGNIFICANT CHANGE UP (ref 150–400)
POTASSIUM SERPL-MCNC: 3.4 MMOL/L — LOW (ref 3.5–5.3)
POTASSIUM SERPL-MCNC: 3.4 MMOL/L — LOW (ref 3.5–5.3)
POTASSIUM SERPL-SCNC: 3.4 MMOL/L — LOW (ref 3.5–5.3)
POTASSIUM SERPL-SCNC: 3.4 MMOL/L — LOW (ref 3.5–5.3)
RBC # BLD: 3.18 M/UL — LOW (ref 4.2–5.8)
RBC # BLD: 3.18 M/UL — LOW (ref 4.2–5.8)
RBC # FLD: 13.4 % — SIGNIFICANT CHANGE UP (ref 10.3–14.5)
RBC # FLD: 13.4 % — SIGNIFICANT CHANGE UP (ref 10.3–14.5)
SODIUM SERPL-SCNC: 143 MMOL/L — SIGNIFICANT CHANGE UP (ref 135–145)
SODIUM SERPL-SCNC: 143 MMOL/L — SIGNIFICANT CHANGE UP (ref 135–145)
WBC # BLD: 14.9 K/UL — HIGH (ref 3.8–10.5)
WBC # BLD: 14.9 K/UL — HIGH (ref 3.8–10.5)
WBC # FLD AUTO: 14.9 K/UL — HIGH (ref 3.8–10.5)
WBC # FLD AUTO: 14.9 K/UL — HIGH (ref 3.8–10.5)

## 2023-10-26 PROCEDURE — 99232 SBSQ HOSP IP/OBS MODERATE 35: CPT | Mod: FS

## 2023-10-26 RX ORDER — POTASSIUM CHLORIDE 20 MEQ
40 PACKET (EA) ORAL ONCE
Refills: 0 | Status: COMPLETED | OUTPATIENT
Start: 2023-10-26 | End: 2023-10-26

## 2023-10-26 RX ORDER — CHLORHEXIDINE GLUCONATE 213 G/1000ML
1 SOLUTION TOPICAL
Refills: 0 | Status: DISCONTINUED | OUTPATIENT
Start: 2023-10-26 | End: 2023-10-31

## 2023-10-26 RX ORDER — SODIUM CHLORIDE 9 MG/ML
1000 INJECTION, SOLUTION INTRAVENOUS
Refills: 0 | Status: DISCONTINUED | OUTPATIENT
Start: 2023-10-26 | End: 2023-10-31

## 2023-10-26 RX ADMIN — Medication 4: at 12:11

## 2023-10-26 RX ADMIN — Medication 4: at 08:27

## 2023-10-26 RX ADMIN — HEPARIN SODIUM 5000 UNIT(S): 5000 INJECTION INTRAVENOUS; SUBCUTANEOUS at 05:55

## 2023-10-26 RX ADMIN — Medication 40 MILLIEQUIVALENT(S): at 08:38

## 2023-10-26 RX ADMIN — PIPERACILLIN AND TAZOBACTAM 25 GRAM(S): 4; .5 INJECTION, POWDER, LYOPHILIZED, FOR SOLUTION INTRAVENOUS at 12:28

## 2023-10-26 RX ADMIN — SODIUM CHLORIDE 200 MILLILITER(S): 9 INJECTION, SOLUTION INTRAVENOUS at 05:08

## 2023-10-26 RX ADMIN — HEPARIN SODIUM 5000 UNIT(S): 5000 INJECTION INTRAVENOUS; SUBCUTANEOUS at 22:25

## 2023-10-26 RX ADMIN — AMLODIPINE BESYLATE 5 MILLIGRAM(S): 2.5 TABLET ORAL at 05:55

## 2023-10-26 RX ADMIN — HEPARIN SODIUM 5000 UNIT(S): 5000 INJECTION INTRAVENOUS; SUBCUTANEOUS at 15:25

## 2023-10-26 RX ADMIN — PANTOPRAZOLE SODIUM 40 MILLIGRAM(S): 20 TABLET, DELAYED RELEASE ORAL at 06:06

## 2023-10-26 RX ADMIN — Medication 2: at 17:06

## 2023-10-26 NOTE — PROGRESS NOTE ADULT - NS ATTEND AMEND GEN_ALL_CORE FT
78M, from home, ambulates with cane, [facility] BPH presents after being found down. Daughter found patient on floor at home, noted with slurred speech and AMS. Of note, was taking cirpo for UTI. Pt reports that previous to this episode, he was suffering from urinary retention for several days and straining to urinate. On presentation, patient was slightly hypotensive, tachypneic, with kidney failure, elevated lactate, and found w large bowl obstruction from inguinal hernia. Admission to ICU was for shock, suspected sepsis 2/2 UTI. CCB AF RVR. Started on zosyn and briefly on pressors. Underwent emergent HD for renal failure. Encephalopathy improved, hemodynamically stable, and hernia was manually reduced, NGT removed, and started on diet. Pt downgraded to medicine.     AP:  TME, resolved  Shock, possibly sepsis, resolved  pyuria  Acute kidney failure, ATN, suspect 2/2 shock  urinary retention, BPH  hematuria, suspect 2/2 AC and trauma  lactic acidosis from shock, kidney failure, metformin use  hypernatremia, from polyuric phase of ATN  large bowel obstruction, inguinal hernia, manually reduced  symptomatic AF with RVR, in response to shock, resolved  HTN  DM2  hyperglycemia    -TME and shock resolved  -treating shock empirically with zoysyn, UCx on admission with no growth  -BCx with no growth  -f/u ID recs  -f/u nephro recs  -strict I/O, c/w D5 for hypernatremia, trend BMP  -failed TOV  -hernia reduced, advance diet as tolerated  -c/w home flomax and finasteride  -urine no longer bloody, will have pt follow up with Urology  -PT eval  -dvt ppx    Ordered labs: BMP, urine osm, urine sodium  Reviewed labs: BMP, urine osm, urine sodium  Discussed management with: Dr Alicea, Dr. Bravo

## 2023-10-26 NOTE — PROGRESS NOTE ADULT - SUBJECTIVE AND OBJECTIVE BOX
Patient is a 78y old  Male who presents with a chief complaint of acute renal failure (26 Oct 2023 12:23)      INTERVAL HPI/OVERNIGHT EVENTS: no acute events overnight, pt sitting up in bed, eating breakfast.       REVIEW OF SYSTEMS:  CONSTITUTIONAL: No fever, chills  ENMT:  No difficulty hearing, no change in vision  NECK: No pain or stiffness  RESPIRATORY: No cough, SOB  CARDIOVASCULAR: No chest pain, palpitations  GASTROINTESTINAL: No abdominal pain. No nausea, vomiting, or diarrhea  GENITOURINARY: No dysuria  NEUROLOGICAL: No HA  SKIN: No itching, burning, rashes, or lesions   LYMPH NODES: No enlarged glands  ENDOCRINE: No heat or cold intolerance; No hair loss  MUSCULOSKELETAL: No joint pain or swelling; No muscle, back, or extremity pain  PSYCHIATRIC: No depression, anxiety  HEME/LYMPH: No easy bruising, or bleeding gums    T(C): 36.9 (10-26-23 @ 05:03), Max: 36.9 (10-26-23 @ 05:03)  HR: 90 (10-26-23 @ 10:48) (81 - 92)  BP: 116/64 (10-26-23 @ 10:48) (110/74 - 160/74)  RR: 18 (10-26-23 @ 05:03) (18 - 18)  SpO2: 95% (10-26-23 @ 10:48) (94% - 98%)  Wt(kg): --Vital Signs Last 24 Hrs  T(C): 36.9 (26 Oct 2023 05:03), Max: 36.9 (26 Oct 2023 05:03)  T(F): 98.4 (26 Oct 2023 05:03), Max: 98.4 (26 Oct 2023 05:03)  HR: 90 (26 Oct 2023 10:48) (81 - 92)  BP: 116/64 (26 Oct 2023 10:48) (110/74 - 160/74)  BP(mean): --  RR: 18 (26 Oct 2023 05:03) (18 - 18)  SpO2: 95% (26 Oct 2023 10:48) (94% - 98%)    Parameters below as of 26 Oct 2023 10:48  Patient On (Oxygen Delivery Method): room air    MEDICATIONS  (STANDING):  amLODIPine   Tablet 5 milliGRAM(s) Oral daily  dextrose 5%. 1000 milliLiter(s) (50 mL/Hr) IV Continuous <Continuous>  dextrose 5%. 1000 milliLiter(s) (100 mL/Hr) IV Continuous <Continuous>  dextrose 5%. 1000 milliLiter(s) (200 mL/Hr) IV Continuous <Continuous>  dextrose 5%. 1000 milliLiter(s) (100 mL/Hr) IV Continuous <Continuous>  dextrose 50% Injectable 12.5 Gram(s) IV Push once  dextrose 50% Injectable 25 Gram(s) IV Push once  dextrose 50% Injectable 25 Gram(s) IV Push once  glucagon  Injectable 1 milliGRAM(s) IntraMuscular once  heparin   Injectable 5000 Unit(s) SubCutaneous every 8 hours  insulin lispro (ADMELOG) corrective regimen sliding scale   SubCutaneous three times a day before meals  insulin lispro (ADMELOG) corrective regimen sliding scale   SubCutaneous at bedtime  lactated ringers. 1000 milliLiter(s) (100 mL/Hr) IV Continuous <Continuous>  pantoprazole    Tablet 40 milliGRAM(s) Oral before breakfast  piperacillin/tazobactam IVPB.. 3.375 Gram(s) IV Intermittent every 12 hours    MEDICATIONS  (PRN):  dextrose Oral Gel 15 Gram(s) Oral once PRN Blood Glucose LESS THAN 70 milliGRAM(s)/deciliter  sodium chloride 0.9% Bolus. 100 milliLiter(s) IV Bolus every 5 minutes PRN SBP LESS THAN or EQUAL to 90 mmHg  sodium chloride 0.9% lock flush 10 milliLiter(s) IV Push every 1 hour PRN Pre/post blood products, medications, blood draw, and to maintain line patency      PHYSICAL EXAM:  GENERAL: NAD  EYES: clear conjunctiva; EOMI  ENMT: Moist mucous membranes  NECK: Supple, No JVD, Normal thyroid  CHEST/LUNG: Clear to auscultation bilaterally; No rales, rhonchi, wheezing, or rubs  HEART: S1, S2, Regular rate and rhythm  ABDOMEN: Soft, Nontender, Nondistended; Bowel sounds present  NEURO: Alert & Oriented X3  EXTREMITIES: No LE edema, no calf tenderness  LYMPH: No lymphadenopathy noted  SKIN: No rashes or lesions    Consultant(s) Notes Reviewed:  [x ] YES  [ ] NO  Care Discussed with Consultants/Other Providers [ x] YES  [ ] NO    LABS:                        9.2    14.90 )-----------( 192      ( 26 Oct 2023 06:32 )             28.6     10-26    143  |  109<H>  |  36<H>  ----------------------------<  294<H>  3.4<L>   |  29  |  1.65<H>    Ca    7.9<L>      26 Oct 2023 06:32  Phos  2.7     10-25  Mg     1.8     10-25        CAPILLARY BLOOD GLUCOSE      POCT Blood Glucose.: 317 mg/dL (26 Oct 2023 11:24)  POCT Blood Glucose.: 305 mg/dL (26 Oct 2023 07:49)  POCT Blood Glucose.: 290 mg/dL (25 Oct 2023 21:51)  POCT Blood Glucose.: 232 mg/dL (25 Oct 2023 16:57)      ABG - ( 24 Oct 2023 18:53 )  pH, Arterial: 7.62  pH, Blood: x     /  pCO2: 39    /  pO2: 78    / HCO3: 40    / Base Excess: 17.2  /  SaO2: 98                Urinalysis Basic - ( 26 Oct 2023 06:32 )    Color: x / Appearance: x / SG: x / pH: x  Gluc: 294 mg/dL / Ketone: x  / Bili: x / Urobili: x   Blood: x / Protein: x / Nitrite: x   Leuk Esterase: x / RBC: x / WBC x   Sq Epi: x / Non Sq Epi: x / Bacteria: x        RADIOLOGY & ADDITIONAL TESTS:    Imaging Personally Reviewed:  [x] YES  [ ] NO  < from: Xray Abdomen 1 View PORTABLE -Urgent (Xray Abdomen 1 View PORTABLE -Urgent .) (10.24.23 @ 11:49) >    ACC: [id number] EXAM:  XR ABDOMEN PORTABLE URGENT 1V   ORDERED BY: CHRISTOPHER KENT     PROCEDURE DATE:  10/24/2023          INTERPRETATION:  HISTORY:  NG tube placement    TECHNIQUE:  A view the upper abdomen and lower chest was obtained.    FINDINGS:  An NG tube is seen in the stomach. The lung bases are clear.   There is no evidence of bowel obstruction. Air is seen in the colon.   There is no gross free air.    IMPRESSION:  NG tube in the stomach.    --- End of Report ---            PASHA DICKINSON MD; Attending Radiologist  This document has been electronically signed. Oct 24 2023  4:08PM    < end of copied text >

## 2023-10-26 NOTE — PROGRESS NOTE ADULT - ASSESSMENT
79 y/o Male with stable L inguinal hernia, resolved LBO  VSS, afebrile, leukocytosis downtrending, Cr improving    Plan  - continue diet as tolerated  - serial abd exams, observation  - monitor bowel function   - HD as per nephrology   - remainder of care per AI team    This note and its recommendations herein are preliminary until such time as cosigned by an attending.

## 2023-10-26 NOTE — PROGRESS NOTE ADULT - ASSESSMENT
AKIN:  Likely ATN from sepsis. No evidence of obstruction.  S/P HD on 10/22. He is voiding > 70 ml/hour.  - Monitor BMP.  - Avoid nephrotoxins.  - Will hold HD as he is in recovery.  - Remove temporary HD catheter.        Metabolic Alkalosis:  Initially, he had High AG Metabolic Acidosis, likely Lactic Acidosis form septic shock.   This has resolved and he is now Alkalotic. Possible due to volume contraction or wasting of Anions in the urine.  - Follow up BMP.    Hypernatremia:  Due to volume contraction. He has post AKIN polyuria, likely hypoosmotic.  - Send urine OSM.  - Hypotonic fluids.        Hypokalemia:  - Supplement as needed.    Incarcerated Hernia:  - Surgery follow up

## 2023-10-26 NOTE — PROGRESS NOTE ADULT - ASSESSMENT
Patient is a 77 y/o M, lives alone at home, ambulates w/ a cane. He has PMHx of HTN, DM, BPH. Admitted for acute renal failure, s/p dialysis x1, nephro following.

## 2023-10-26 NOTE — PROGRESS NOTE ADULT - SUBJECTIVE AND OBJECTIVE BOX
INTERVAL HPI/OVERNIGHT EVENTS:  Pt resting comfortably in bed. No acute complaints, admits to feeling well. Pt is passing flatus, normal bowel movement yesterday. Tolerating regular diet. Denies nausea, vomiting, fever, chills.     MEDICATIONS  (STANDING):  amLODIPine   Tablet 5 milliGRAM(s) Oral daily  dextrose 5%. 1000 milliLiter(s) (50 mL/Hr) IV Continuous <Continuous>  dextrose 5%. 1000 milliLiter(s) (100 mL/Hr) IV Continuous <Continuous>  dextrose 5%. 1000 milliLiter(s) (200 mL/Hr) IV Continuous <Continuous>  dextrose 5%. 1000 milliLiter(s) (200 mL/Hr) IV Continuous <Continuous>  dextrose 5%. 1000 milliLiter(s) (100 mL/Hr) IV Continuous <Continuous>  dextrose 50% Injectable 12.5 Gram(s) IV Push once  dextrose 50% Injectable 25 Gram(s) IV Push once  dextrose 50% Injectable 25 Gram(s) IV Push once  glucagon  Injectable 1 milliGRAM(s) IntraMuscular once  heparin   Injectable 5000 Unit(s) SubCutaneous every 8 hours  insulin lispro (ADMELOG) corrective regimen sliding scale   SubCutaneous three times a day before meals  insulin lispro (ADMELOG) corrective regimen sliding scale   SubCutaneous at bedtime  pantoprazole    Tablet 40 milliGRAM(s) Oral before breakfast  piperacillin/tazobactam IVPB.. 3.375 Gram(s) IV Intermittent every 12 hours  potassium chloride    Tablet ER 40 milliEquivalent(s) Oral once    MEDICATIONS  (PRN):  dextrose Oral Gel 15 Gram(s) Oral once PRN Blood Glucose LESS THAN 70 milliGRAM(s)/deciliter  sodium chloride 0.9% Bolus. 100 milliLiter(s) IV Bolus every 5 minutes PRN SBP LESS THAN or EQUAL to 90 mmHg  sodium chloride 0.9% lock flush 10 milliLiter(s) IV Push every 1 hour PRN Pre/post blood products, medications, blood draw, and to maintain line patency      Vital Signs Last 24 Hrs  T(C): 36.9 (26 Oct 2023 05:03), Max: 36.9 (26 Oct 2023 05:03)  T(F): 98.4 (26 Oct 2023 05:03), Max: 98.4 (26 Oct 2023 05:03)  HR: 81 (26 Oct 2023 05:03) (81 - 92)  BP: 160/74 (26 Oct 2023 05:03) (110/74 - 160/74)  BP(mean): --  RR: 18 (26 Oct 2023 05:03) (18 - 18)  SpO2: 94% (26 Oct 2023 05:03) (94% - 98%)    Parameters below as of 26 Oct 2023 05:03  Patient On (Oxygen Delivery Method): room air        Physical:  General: A&Ox3. NAD.  Resp: Unlabored breathing. Equal chest rise bilaterally.  Abdomen: Soft nondistended, nontender to palpation diffusely. No voluntary guarding, no rebound tenderness.  Groin/Scrotum: Stable chronic hernia, soft. Nontender. No overlying skin changes.       I&O's Detail    25 Oct 2023 07:01  -  26 Oct 2023 07:00  --------------------------------------------------------  IN:  Total IN: 0 mL    OUT:    Indwelling Catheter - Urethral (mL): 2150 mL  Total OUT: 2150 mL    Total NET: -2150 mL          LABS:                        9.2    14.90 )-----------( 192      ( 26 Oct 2023 06:32 )             28.6             10-26    143  |  109<H>  |  36<H>  ----------------------------<  294<H>  3.4<L>   |  29  |  1.65<H>    Ca    7.9<L>      26 Oct 2023 06:32  Phos  2.7     10-25  Mg     1.8     10-25        78y.o. Male

## 2023-10-26 NOTE — PROGRESS NOTE ADULT - SUBJECTIVE AND OBJECTIVE BOX
FRANCISCOROSA ANITA  78y  Patient is a 78y old  Male who presents with a chief complaint of acute renal failure (26 Oct 2023 08:21)    HPI:  This is a patient admitted for AKIN. HD initiated and is now held.  He is voiding well.    HEALTH ISSUES - PROBLEM Dx:  Metabolic encephalopathy    AKIN (acute kidney injury)    H/O ventral hernia repair    Recurrent ventral hernia    Hypernatremia    HTN (hypertension)    DM (diabetes mellitus)    Afib    Prophylactic measure          MEDICATIONS  (STANDING):  amLODIPine   Tablet 5 milliGRAM(s) Oral daily  dextrose 5%. 1000 milliLiter(s) (50 mL/Hr) IV Continuous <Continuous>  dextrose 5%. 1000 milliLiter(s) (200 mL/Hr) IV Continuous <Continuous>  dextrose 5%. 1000 milliLiter(s) (100 mL/Hr) IV Continuous <Continuous>  dextrose 5%. 1000 milliLiter(s) (100 mL/Hr) IV Continuous <Continuous>  dextrose 50% Injectable 12.5 Gram(s) IV Push once  dextrose 50% Injectable 25 Gram(s) IV Push once  dextrose 50% Injectable 25 Gram(s) IV Push once  glucagon  Injectable 1 milliGRAM(s) IntraMuscular once  heparin   Injectable 5000 Unit(s) SubCutaneous every 8 hours  insulin lispro (ADMELOG) corrective regimen sliding scale   SubCutaneous three times a day before meals  insulin lispro (ADMELOG) corrective regimen sliding scale   SubCutaneous at bedtime  lactated ringers. 1000 milliLiter(s) (100 mL/Hr) IV Continuous <Continuous>  pantoprazole    Tablet 40 milliGRAM(s) Oral before breakfast  piperacillin/tazobactam IVPB.. 3.375 Gram(s) IV Intermittent every 12 hours    MEDICATIONS  (PRN):  dextrose Oral Gel 15 Gram(s) Oral once PRN Blood Glucose LESS THAN 70 milliGRAM(s)/deciliter  sodium chloride 0.9% Bolus. 100 milliLiter(s) IV Bolus every 5 minutes PRN SBP LESS THAN or EQUAL to 90 mmHg  sodium chloride 0.9% lock flush 10 milliLiter(s) IV Push every 1 hour PRN Pre/post blood products, medications, blood draw, and to maintain line patency    Vital Signs Last 24 Hrs  T(C): 36.9 (26 Oct 2023 05:03), Max: 36.9 (26 Oct 2023 05:03)  T(F): 98.4 (26 Oct 2023 05:03), Max: 98.4 (26 Oct 2023 05:03)  HR: 90 (26 Oct 2023 10:48) (81 - 92)  BP: 116/64 (26 Oct 2023 10:48) (110/74 - 160/74)  BP(mean): --  RR: 18 (26 Oct 2023 05:03) (18 - 18)  SpO2: 95% (26 Oct 2023 10:48) (94% - 98%)    Parameters below as of 26 Oct 2023 10:48  Patient On (Oxygen Delivery Method): room air      Daily     Daily     PHYSICAL EXAM:  Constitutional:  He appears comfortable and not distressed. Not diaphoretic.    Neck:  The thyroid is normal. Trachea is midline.     Breasts: Normal examination.    Respiratory: The lungs are clear to auscultation. No dullness and expansion is normal.    Cardiovascular: S1 and S2 are normal. No mummurs, rubs or gallops are present.    Gastrointestinal: The abdomen is soft. No tenderness is present. No masses are present. Bowel sounds are normal.    Genitourinary: The bladder is not distended. No CVA tenderness is present.    Extremities: No edema is noted. No deformities are present.    Neurological: Cognition is normal. Tone, power and sensation are normal. Gait is steady.    Skin: No lesions are seen  or palpated.    Lymph Nodes: No lymphadenopathy is present.    Psychiatric: Mood is appropriate. No hallucinations or flight of ideas are noted.                              9.2    14.90 )-----------( 192      ( 26 Oct 2023 06:32 )             28.6     10-26    143  |  109<H>  |  36<H>  ----------------------------<  294<H>  3.4<L>   |  29  |  1.65<H>    Ca    7.9<L>      26 Oct 2023 06:32  Phos  2.7     10-25  Mg     1.8     10-25

## 2023-10-27 DIAGNOSIS — Z02.9 ENCOUNTER FOR ADMINISTRATIVE EXAMINATIONS, UNSPECIFIED: ICD-10-CM

## 2023-10-27 LAB
ALBUMIN SERPL ELPH-MCNC: 2.3 G/DL — LOW (ref 3.5–5)
ALBUMIN SERPL ELPH-MCNC: 2.3 G/DL — LOW (ref 3.5–5)
ALP SERPL-CCNC: 72 U/L — SIGNIFICANT CHANGE UP (ref 40–120)
ALP SERPL-CCNC: 72 U/L — SIGNIFICANT CHANGE UP (ref 40–120)
ALT FLD-CCNC: 32 U/L DA — SIGNIFICANT CHANGE UP (ref 10–60)
ALT FLD-CCNC: 32 U/L DA — SIGNIFICANT CHANGE UP (ref 10–60)
ANION GAP SERPL CALC-SCNC: 8 MMOL/L — SIGNIFICANT CHANGE UP (ref 5–17)
ANION GAP SERPL CALC-SCNC: 8 MMOL/L — SIGNIFICANT CHANGE UP (ref 5–17)
AST SERPL-CCNC: 19 U/L — SIGNIFICANT CHANGE UP (ref 10–40)
AST SERPL-CCNC: 19 U/L — SIGNIFICANT CHANGE UP (ref 10–40)
BILIRUB SERPL-MCNC: 0.5 MG/DL — SIGNIFICANT CHANGE UP (ref 0.2–1.2)
BILIRUB SERPL-MCNC: 0.5 MG/DL — SIGNIFICANT CHANGE UP (ref 0.2–1.2)
BUN SERPL-MCNC: 28 MG/DL — HIGH (ref 7–18)
BUN SERPL-MCNC: 28 MG/DL — HIGH (ref 7–18)
CALCIUM SERPL-MCNC: 7.6 MG/DL — LOW (ref 8.4–10.5)
CALCIUM SERPL-MCNC: 7.6 MG/DL — LOW (ref 8.4–10.5)
CHLORIDE SERPL-SCNC: 110 MMOL/L — HIGH (ref 96–108)
CHLORIDE SERPL-SCNC: 110 MMOL/L — HIGH (ref 96–108)
CO2 SERPL-SCNC: 26 MMOL/L — SIGNIFICANT CHANGE UP (ref 22–31)
CO2 SERPL-SCNC: 26 MMOL/L — SIGNIFICANT CHANGE UP (ref 22–31)
CREAT SERPL-MCNC: 1.45 MG/DL — HIGH (ref 0.5–1.3)
CREAT SERPL-MCNC: 1.45 MG/DL — HIGH (ref 0.5–1.3)
CULTURE RESULTS: SIGNIFICANT CHANGE UP
EGFR: 50 ML/MIN/1.73M2 — LOW
EGFR: 50 ML/MIN/1.73M2 — LOW
GLUCOSE BLDC GLUCOMTR-MCNC: 209 MG/DL — HIGH (ref 70–99)
GLUCOSE BLDC GLUCOMTR-MCNC: 209 MG/DL — HIGH (ref 70–99)
GLUCOSE BLDC GLUCOMTR-MCNC: 311 MG/DL — HIGH (ref 70–99)
GLUCOSE BLDC GLUCOMTR-MCNC: 311 MG/DL — HIGH (ref 70–99)
GLUCOSE BLDC GLUCOMTR-MCNC: 372 MG/DL — HIGH (ref 70–99)
GLUCOSE BLDC GLUCOMTR-MCNC: 393 MG/DL — HIGH (ref 70–99)
GLUCOSE BLDC GLUCOMTR-MCNC: 393 MG/DL — HIGH (ref 70–99)
GLUCOSE SERPL-MCNC: 173 MG/DL — HIGH (ref 70–99)
GLUCOSE SERPL-MCNC: 173 MG/DL — HIGH (ref 70–99)
HCT VFR BLD CALC: 28 % — LOW (ref 39–50)
HCT VFR BLD CALC: 28 % — LOW (ref 39–50)
HGB BLD-MCNC: 9.2 G/DL — LOW (ref 13–17)
HGB BLD-MCNC: 9.2 G/DL — LOW (ref 13–17)
MCHC RBC-ENTMCNC: 29.2 PG — SIGNIFICANT CHANGE UP (ref 27–34)
MCHC RBC-ENTMCNC: 29.2 PG — SIGNIFICANT CHANGE UP (ref 27–34)
MCHC RBC-ENTMCNC: 32.9 GM/DL — SIGNIFICANT CHANGE UP (ref 32–36)
MCHC RBC-ENTMCNC: 32.9 GM/DL — SIGNIFICANT CHANGE UP (ref 32–36)
MCV RBC AUTO: 88.9 FL — SIGNIFICANT CHANGE UP (ref 80–100)
MCV RBC AUTO: 88.9 FL — SIGNIFICANT CHANGE UP (ref 80–100)
NRBC # BLD: 0 /100 WBCS — SIGNIFICANT CHANGE UP (ref 0–0)
NRBC # BLD: 0 /100 WBCS — SIGNIFICANT CHANGE UP (ref 0–0)
PLATELET # BLD AUTO: 165 K/UL — SIGNIFICANT CHANGE UP (ref 150–400)
PLATELET # BLD AUTO: 165 K/UL — SIGNIFICANT CHANGE UP (ref 150–400)
POTASSIUM SERPL-MCNC: 3.6 MMOL/L — SIGNIFICANT CHANGE UP (ref 3.5–5.3)
POTASSIUM SERPL-MCNC: 3.6 MMOL/L — SIGNIFICANT CHANGE UP (ref 3.5–5.3)
POTASSIUM SERPL-SCNC: 3.6 MMOL/L — SIGNIFICANT CHANGE UP (ref 3.5–5.3)
POTASSIUM SERPL-SCNC: 3.6 MMOL/L — SIGNIFICANT CHANGE UP (ref 3.5–5.3)
PROT SERPL-MCNC: 5.2 G/DL — LOW (ref 6–8.3)
PROT SERPL-MCNC: 5.2 G/DL — LOW (ref 6–8.3)
RBC # BLD: 3.15 M/UL — LOW (ref 4.2–5.8)
RBC # BLD: 3.15 M/UL — LOW (ref 4.2–5.8)
RBC # FLD: 13 % — SIGNIFICANT CHANGE UP (ref 10.3–14.5)
RBC # FLD: 13 % — SIGNIFICANT CHANGE UP (ref 10.3–14.5)
SODIUM SERPL-SCNC: 144 MMOL/L — SIGNIFICANT CHANGE UP (ref 135–145)
SODIUM SERPL-SCNC: 144 MMOL/L — SIGNIFICANT CHANGE UP (ref 135–145)
SPECIMEN SOURCE: SIGNIFICANT CHANGE UP
WBC # BLD: 14.87 K/UL — HIGH (ref 3.8–10.5)
WBC # BLD: 14.87 K/UL — HIGH (ref 3.8–10.5)
WBC # FLD AUTO: 14.87 K/UL — HIGH (ref 3.8–10.5)
WBC # FLD AUTO: 14.87 K/UL — HIGH (ref 3.8–10.5)

## 2023-10-27 PROCEDURE — 99233 SBSQ HOSP IP/OBS HIGH 50: CPT | Mod: FS

## 2023-10-27 RX ORDER — BACITRACIN ZINC 500 UNIT/G
1 OINTMENT IN PACKET (EA) TOPICAL
Refills: 0 | Status: DISCONTINUED | OUTPATIENT
Start: 2023-10-27 | End: 2023-10-31

## 2023-10-27 RX ADMIN — HEPARIN SODIUM 5000 UNIT(S): 5000 INJECTION INTRAVENOUS; SUBCUTANEOUS at 14:36

## 2023-10-27 RX ADMIN — CHLORHEXIDINE GLUCONATE 1 APPLICATION(S): 213 SOLUTION TOPICAL at 05:51

## 2023-10-27 RX ADMIN — AMLODIPINE BESYLATE 5 MILLIGRAM(S): 2.5 TABLET ORAL at 07:02

## 2023-10-27 RX ADMIN — Medication 2: at 08:10

## 2023-10-27 RX ADMIN — HEPARIN SODIUM 5000 UNIT(S): 5000 INJECTION INTRAVENOUS; SUBCUTANEOUS at 22:25

## 2023-10-27 RX ADMIN — PIPERACILLIN AND TAZOBACTAM 25 GRAM(S): 4; .5 INJECTION, POWDER, LYOPHILIZED, FOR SOLUTION INTRAVENOUS at 12:28

## 2023-10-27 RX ADMIN — Medication 5: at 18:04

## 2023-10-27 RX ADMIN — Medication 3: at 22:25

## 2023-10-27 RX ADMIN — Medication 4: at 11:57

## 2023-10-27 RX ADMIN — PIPERACILLIN AND TAZOBACTAM 25 GRAM(S): 4; .5 INJECTION, POWDER, LYOPHILIZED, FOR SOLUTION INTRAVENOUS at 00:21

## 2023-10-27 RX ADMIN — PANTOPRAZOLE SODIUM 40 MILLIGRAM(S): 20 TABLET, DELAYED RELEASE ORAL at 06:44

## 2023-10-27 RX ADMIN — HEPARIN SODIUM 5000 UNIT(S): 5000 INJECTION INTRAVENOUS; SUBCUTANEOUS at 05:51

## 2023-10-27 RX ADMIN — Medication 1 APPLICATION(S): at 20:50

## 2023-10-27 NOTE — PROCEDURE NOTE - NSPROCNAME_GEN_A_CORE
General
Peripheral Line Insertion
Arterial Puncture/Cannulation
Central Line Insertion
Central Line Insertion

## 2023-10-27 NOTE — DISCHARGE NOTE PROVIDER - ATTENDING DISCHARGE PHYSICAL EXAMINATION:
Gen: NAD  Neuro: alert, answering qs appropriately, moves all extremities  HEENT: anicteric, moist oral mucosa  Neck: supple  Cards: no murmurs appreicated  Pulm: good inspiratory effort, breathing comfortably  Abd: soft, NT/ND, BS+  Ext: no edema  Skin: warm, dry  : guerra draining yellow clear urine

## 2023-10-27 NOTE — PROGRESS NOTE ADULT - SUBJECTIVE AND OBJECTIVE BOX
78y Male    Meds:  piperacillin/tazobactam IVPB.. 3.375 Gram(s) IV Intermittent every 12 hours    Allergies    No Known Drug Allergies  shellfish (Swelling (Moderate))  strawberry (Pruritus; Rash; Swelling (Moderate))    Intolerances        VITALS:  Vital Signs Last 24 Hrs  T(C): 36.3 (27 Oct 2023 12:55), Max: 36.7 (26 Oct 2023 21:01)  T(F): 97.4 (27 Oct 2023 12:55), Max: 98 (26 Oct 2023 21:01)  HR: 98 (27 Oct 2023 12:55) (71 - 98)  BP: 122/64 (27 Oct 2023 12:55) (122/64 - 163/73)  BP(mean): --  RR: 18 (27 Oct 2023 12:55) (18 - 18)  SpO2: 99% (27 Oct 2023 12:55) (95% - 99%)    Parameters below as of 27 Oct 2023 12:55  Patient On (Oxygen Delivery Method): room air        LABS/DIAGNOSTIC TESTS:                          9.2    14.87 )-----------( 165      ( 27 Oct 2023 05:26 )             28.0         10-27    144  |  110<H>  |  28<H>  ----------------------------<  173<H>  3.6   |  26  |  1.45<H>    Ca    7.6<L>      27 Oct 2023 05:26    TPro  5.2<L>  /  Alb  2.3<L>  /  TBili  0.5  /  DBili  x   /  AST  19  /  ALT  32  /  AlkPhos  72  10-27      LIVER FUNCTIONS - ( 27 Oct 2023 05:26 )  Alb: 2.3 g/dL / Pro: 5.2 g/dL / ALK PHOS: 72 U/L / ALT: 32 U/L DA / AST: 19 U/L / GGT: x             CULTURES: .Blood Blood-Peripheral  10-22 @ 14:20   No growth at 4 days  --  --      .Blood Blood-Peripheral  10-22 @ 14:10   No growth at 4 days  --  --      Catheterized Catheterized  10-22 @ 12:36   No growth  --  --            RADIOLOGY:      ROS:  [  ] UNABLE TO ELICIT 78y Male who is doing well overall, he is a little agitated just now.    Meds:  piperacillin/tazobactam IVPB.. 3.375 Gram(s) IV Intermittent every 12 hours    Allergies    No Known Drug Allergies  shellfish (Swelling (Moderate))  strawberry (Pruritus; Rash; Swelling (Moderate))    Intolerances        VITALS:  Vital Signs Last 24 Hrs  T(C): 36.3 (27 Oct 2023 12:55), Max: 36.7 (26 Oct 2023 21:01)  T(F): 97.4 (27 Oct 2023 12:55), Max: 98 (26 Oct 2023 21:01)  HR: 98 (27 Oct 2023 12:55) (71 - 98)  BP: 122/64 (27 Oct 2023 12:55) (122/64 - 163/73)  BP(mean): --  RR: 18 (27 Oct 2023 12:55) (18 - 18)  SpO2: 99% (27 Oct 2023 12:55) (95% - 99%)    Parameters below as of 27 Oct 2023 12:55  Patient On (Oxygen Delivery Method): room air        LABS/DIAGNOSTIC TESTS:                          9.2    14.87 )-----------( 165      ( 27 Oct 2023 05:26 )             28.0         10-27    144  |  110<H>  |  28<H>  ----------------------------<  173<H>  3.6   |  26  |  1.45<H>    Ca    7.6<L>      27 Oct 2023 05:26    TPro  5.2<L>  /  Alb  2.3<L>  /  TBili  0.5  /  DBili  x   /  AST  19  /  ALT  32  /  AlkPhos  72  10-27      LIVER FUNCTIONS - ( 27 Oct 2023 05:26 )  Alb: 2.3 g/dL / Pro: 5.2 g/dL / ALK PHOS: 72 U/L / ALT: 32 U/L DA / AST: 19 U/L / GGT: x             CULTURES: .Blood Blood-Peripheral  10-22 @ 14:20   No growth at 4 days  --  --      .Blood Blood-Peripheral  10-22 @ 14:10   No growth at 4 days  --  --      Catheterized Catheterized  10-22 @ 12:36   No growth  --  --            RADIOLOGY:      ROS:  [  ] UNABLE TO ELICIT 78y Male who is doing well overall, he is a little agitated just now. He is afebrile but his WBC count is still a high. He has no nausea , vomiting or diarrhea, he has no coughing or SOB, he still has a guerra in place annd is going to be discharged with a guerra.    Meds:  piperacillin/tazobactam IVPB.. 3.375 Gram(s) IV Intermittent every 12 hours    Allergies    No Known Drug Allergies  shellfish (Swelling (Moderate))  strawberry (Pruritus; Rash; Swelling (Moderate))    Intolerances        VITALS:  Vital Signs Last 24 Hrs  T(C): 36.3 (27 Oct 2023 12:55), Max: 36.7 (26 Oct 2023 21:01)  T(F): 97.4 (27 Oct 2023 12:55), Max: 98 (26 Oct 2023 21:01)  HR: 98 (27 Oct 2023 12:55) (71 - 98)  BP: 122/64 (27 Oct 2023 12:55) (122/64 - 163/73)  BP(mean): --  RR: 18 (27 Oct 2023 12:55) (18 - 18)  SpO2: 99% (27 Oct 2023 12:55) (95% - 99%)    Parameters below as of 27 Oct 2023 12:55  Patient On (Oxygen Delivery Method): room air        LABS/DIAGNOSTIC TESTS:                          9.2    14.87 )-----------( 165      ( 27 Oct 2023 05:26 )             28.0         10-27    144  |  110<H>  |  28<H>  ----------------------------<  173<H>  3.6   |  26  |  1.45<H>    Ca    7.6<L>      27 Oct 2023 05:26    TPro  5.2<L>  /  Alb  2.3<L>  /  TBili  0.5  /  DBili  x   /  AST  19  /  ALT  32  /  AlkPhos  72  10-27      LIVER FUNCTIONS - ( 27 Oct 2023 05:26 )  Alb: 2.3 g/dL / Pro: 5.2 g/dL / ALK PHOS: 72 U/L / ALT: 32 U/L DA / AST: 19 U/L / GGT: x             CULTURES: .Blood Blood-Peripheral  10-22 @ 14:20   No growth at 4 days  --  --      .Blood Blood-Peripheral  10-22 @ 14:10   No growth at 4 days  --  --      Catheterized Catheterized  10-22 @ 12:36   No growth  --  --            RADIOLOGY:      ROS:  [  ] UNABLE TO ELICIT

## 2023-10-27 NOTE — PROGRESS NOTE ADULT - PROBLEM SELECTOR PLAN 1
- likely 2/2 to septic shock due to  large bowel obstruction.  - Resolved  - Currently at baseline  - CTA H/N - neg CVA  - UTox - neg
- likely 2/2 to septic shock 2/2 to large bowel obstruction.  - Resolved  - Currently at baseline AAOx3  - CTA H/N - neg CVA  - UTox - neg
- likely 2/2 to septic shock 2/2 to large bowel obstruction.  - Resolved  - Currently at baseline AAOx3  - CTA H/N - neg CVA  - UTox - neg

## 2023-10-27 NOTE — PROGRESS NOTE ADULT - PROBLEM SELECTOR PLAN 2
acute renal failure (baseline) - 0.96  - S/P HD on 10/23 via R IJ   - Now likely due to ATN, Urine output 4L past 24 hours 10/24/23.  - avoid nephrotoxic agents  - trend BMP  - s/p guerra - 10/25 - will TOV when pt continues to improves
acute renal failure (baseline) - 0.96  - S/P HD on 10/23 via R IJ   - Now likely due to ATN, Urine output 4L past 24 hours 10/24/23.  - avoid nephrotoxic agents  - trend BMP  - s/p guerra - 10/25 overnight
acute renal failure (baseline) - 0.96  - S/P HD on 10/23 via R IJ X1- IJ d/c on 10/27  - Now likely due to ATN, Urine output 4L past 24 hours 10/24/23.  - avoid nephrotoxic agents  - trend BMP  - s/p guerra - 10/25   - Pt d/c with guerra and follow up with Dr Chauhan

## 2023-10-27 NOTE — PROGRESS NOTE ADULT - PROBLEM SELECTOR PLAN 5
- home meds - amlodipine, benazepril  - BP monitoring  - hold benazepril in the setting of AKIN  - will resume  amlodipine- pt BP trending u

## 2023-10-27 NOTE — PROGRESS NOTE ADULT - SUBJECTIVE AND OBJECTIVE BOX
FRANCISCOROSA ANITA  78y  Patient is a 78y old  Male who presents with a chief complaint of acute renal failure (27 Oct 2023 11:08)    HPI:  Seen and examined. Followed for AKIN.  He was dialysed but is off HD.    HEALTH ISSUES - PROBLEM Dx:  Metabolic encephalopathy    H/O ventral hernia repair    Recurrent ventral hernia    Hypernatremia    HTN (hypertension)    DM (diabetes mellitus)    Afib    Prophylactic measure    Discharge planning issues          MEDICATIONS  (STANDING):  amLODIPine   Tablet 5 milliGRAM(s) Oral daily  bacitracin   Ointment 1 Application(s) Topical two times a day  chlorhexidine 2% Cloths 1 Application(s) Topical <User Schedule>  dextrose 5%. 1000 milliLiter(s) (50 mL/Hr) IV Continuous <Continuous>  dextrose 5%. 1000 milliLiter(s) (100 mL/Hr) IV Continuous <Continuous>  dextrose 5%. 1000 milliLiter(s) (100 mL/Hr) IV Continuous <Continuous>  dextrose 5%. 1000 milliLiter(s) (200 mL/Hr) IV Continuous <Continuous>  dextrose 50% Injectable 25 Gram(s) IV Push once  dextrose 50% Injectable 12.5 Gram(s) IV Push once  dextrose 50% Injectable 25 Gram(s) IV Push once  glucagon  Injectable 1 milliGRAM(s) IntraMuscular once  heparin   Injectable 5000 Unit(s) SubCutaneous every 8 hours  insulin lispro (ADMELOG) corrective regimen sliding scale   SubCutaneous three times a day before meals  insulin lispro (ADMELOG) corrective regimen sliding scale   SubCutaneous at bedtime  lactated ringers. 1000 milliLiter(s) (100 mL/Hr) IV Continuous <Continuous>  pantoprazole    Tablet 40 milliGRAM(s) Oral before breakfast  piperacillin/tazobactam IVPB.. 3.375 Gram(s) IV Intermittent every 12 hours    MEDICATIONS  (PRN):  dextrose Oral Gel 15 Gram(s) Oral once PRN Blood Glucose LESS THAN 70 milliGRAM(s)/deciliter  sodium chloride 0.9% Bolus. 100 milliLiter(s) IV Bolus every 5 minutes PRN SBP LESS THAN or EQUAL to 90 mmHg  sodium chloride 0.9% lock flush 10 milliLiter(s) IV Push every 1 hour PRN Pre/post blood products, medications, blood draw, and to maintain line patency    Vital Signs Last 24 Hrs  T(C): 36.3 (27 Oct 2023 12:55), Max: 36.7 (26 Oct 2023 21:01)  T(F): 97.4 (27 Oct 2023 12:55), Max: 98 (26 Oct 2023 21:01)  HR: 98 (27 Oct 2023 12:55) (71 - 98)  BP: 122/64 (27 Oct 2023 12:55) (122/64 - 163/73)  BP(mean): --  RR: 18 (27 Oct 2023 12:55) (18 - 18)  SpO2: 99% (27 Oct 2023 12:55) (95% - 99%)    Parameters below as of 27 Oct 2023 12:55  Patient On (Oxygen Delivery Method): room air      Daily     Daily     PHYSICAL EXAM:  Constitutional: He appears comfortable and not distressed. Not diaphoretic.    Neck:  The thyroid is normal. Trachea is midline.     Breasts: Normal examination.    Respiratory: The lungs are clear to auscultation. No dullness and expansion is normal.    Cardiovascular: S1 and S2 are normal. No mummurs, rubs or gallops are present.    Gastrointestinal: The abdomen is soft. No tenderness is present. No masses are present. Bowel sounds are normal.    Genitourinary: The bladder is not distended. No CVA tenderness is present.    Extremities: No edema is noted. No deformities are present.    Neurological: Cognition is normal. Tone, power and sensation are normal. Gait is steady.    Skin: No leasions are seen  or palpated.    Lymph Nodes: No lymphadenopathy is present.    Psychiatric: Mood is appropriate. No hallucinations or flight of ideas are noted.                              9.2    14.87 )-----------( 165      ( 27 Oct 2023 05:26 )             28.0     10-27    144  |  110<H>  |  28<H>  ----------------------------<  173<H>  3.6   |  26  |  1.45<H>    Ca    7.6<L>      27 Oct 2023 05:26    TPro  5.2<L>  /  Alb  2.3<L>  /  TBili  0.5  /  DBili  x   /  AST  19  /  ALT  32  /  AlkPhos  72  10-27    Urinalysis Basic - ( 27 Oct 2023 05:26 )    Color: x / Appearance: x / SG: x / pH: x  Gluc: 173 mg/dL / Ketone: x  / Bili: x / Urobili: x   Blood: x / Protein: x / Nitrite: x   Leuk Esterase: x / RBC: x / WBC x   Sq Epi: x / Non Sq Epi: x / Bacteria: x

## 2023-10-27 NOTE — DISCHARGE NOTE PROVIDER - NSDCCPCAREPLAN_GEN_ALL_CORE_FT
PRINCIPAL DISCHARGE DIAGNOSIS  Diagnosis: Septic shock  Assessment and Plan of Treatment: you initially came into the hospital after being found on floor with confusion  you were admitted to the ICU and found to have Sepsis caused by a UTI.   you were given a course of antibiotics.   follow up in rehab to monitor your leukocytosis, upon discharge your white blood cell count was 14k  Sepsis happens when an infection spreads and causes your body to react strongly to germs. Your body's defense system normally releases chemicals to fight off infection at the infected area. When infection spreads, chemicals are released throughout your body. The chemicals cause inflammation and clotting in small blood vessels that is difficult to control. Inflammation and clotting decrease blood flow and oxygen to your organs. This may cause your organs to stop working correctly. Sepsis is a life-threatening emergency.  if you have any pain or low grade temperature of 100.4F, you can take tylenol 650 mg every 6 hours as needed      SECONDARY DISCHARGE DIAGNOSES  Diagnosis: Acute UTI  Assessment and Plan of Treatment: you came into the hospital with a UTI, you are to continue taking:       Diagnosis: Acute renal failure  Assessment and Plan of Treatment: you initially found to have elevated creatinine of 12.6 and you were given emergent dialysis and improved.  follow up with Nephrology Dr. Schultz in 1-2 weeks  Avoid taking (NSAIDs) - (ex: Ibuprofen, Advil, Celebrex, Naprosyn)  Avoid taking any nephrotoxic agents (can harm kidneys) - Intravenous contrast for diagnostic testing, combination cold medications.  Have all medications adjusted for your renal function by your Health Care Provider.  Blood pressure control is important.  Take all medication as prescribed.      Diagnosis: Anemia of renal disease  Assessment and Plan of Treatment: upon discharge your hemoglobin was 9.2, this was due to your chronic kidney disease  follow up with Nephrologist Dr. Schultz  Eat healthy foods rich in iron and vitamin C. Nuts, meat, dark leafy green vegetables, and beans are high in iron and protein. Vitamin C helps your body absorb iron. Foods rich in vitamin C include oranges and other citrus fruits. Ask your healthcare provider for a list of other foods that are high in iron or vitamin C. Ask if you need to be on a special diet.  Call your local emergency number (604 in the [place]), or have someone call if:  You lose consciousness.  You have severe chest pain.  When should I seek immediate care?  You have dark or bloody bowel movements.      Diagnosis: Electrolyte abnormality  Assessment and Plan of Treatment: you were found to have multiple electrolyte abnormalities due to colon obstruction and renal failure  you were given IV fluids and electrolyte replacements    Diagnosis: Metabolic encephalopathy  Assessment and Plan of Treatment: you initially came into the hospital with altered mental status, this was caused by renal failure, electrolyte imbalance and bowel obstruction  you had a CT angiogram of your head and neck that did not show any acute findings  you are now back to your baseline after dialysis    Diagnosis: Left inguinal hernia  Assessment and Plan of Treatment: you had a CT scan that showed a large left inguinal hernia  this was manually reduced by Surgery Team  please follow up with Surgeon Dr. Toro if you notice your inguinal hernia comes back, becomes enlarged, or if you have any worsening groin pain.    Diagnosis: Atrial fibrillation with RVR  Assessment and Plan of Treatment: during your hospitalization you were found to have a fast and irregular heart rate due to recent infection, you were given a short duration of a blood thinner called heparin to prevent blood clots.   after your infection was resolved your heart rate normalized and you did not need any further blood thinning medication    Diagnosis: DM (diabetes mellitus)  Assessment and Plan of Treatment: your hemoglobin a1c was 5.9%  Make sure you get your HgA1c checked every three months.  If you take insulin, check your blood glucose before meals and at bedtime.  It's important not to skip any meals.  Keep a log of your blood glucose results and always take it with you to your doctor appointments.  Keep a list of your current medications including injectables and over the counter medications and bring this medication list with you to all your doctor appointments.  If you have not seen your ophthalmologist this year call for appointment.  Check your feet daily for redness, sores, or openings. Do not self treat. If no improvement in two days call your primary care physician for an appointment.  Low blood sugar (hypoglycemia) is a blood sugar below 70mg/dl. Check your blood sugar if you feel signs/symptoms of hypoglycemia. If your blood sugar is below 70 take 15 grams of carbohydrates (ex 4 oz of apple juice, 3-4 glucose tablets, or 4-6 oz of regular soda) wait 15 minutes and repeat blood sugar to make sure it comes up above 70.  If your blood sugar is above 70 and you are due for a meal, have a meal.  If you are not due for a meal have a snack.  This snack helps keeps your blood sugar at a safe range.      Diagnosis: HTN (hypertension)  Assessment and Plan of Treatment: your blood pressure ranged between: (105/43 - 163/73)  continue taking amlodipine 5 mg daily  hold off on taking benazepril until your kidney function improves   follow up with your primary care doctor or cardiologist.  try to take your blood pressure readings twice a day, morning and night time.   Notify your doctor if you have any of the following symptoms:   Dizziness, Lightheadedness, Blurry vision, Headache, Chest pain, Shortness of breath      Diagnosis: Benign prostatic hyperplasia with urinary retention  Assessment and Plan of Treatment: you have a history of enlarged prostate  during your hospitalization you failed trial of void  you had a guerra placed since 10/25/23   please follow up with Urologist Dr. Chauhan to do a trial of void and remove guerra    Diagnosis: Pressure ulcer  Assessment and Plan of Treatment: you were found to have a Stage 1 Pressure Injury to the Coccyx and Bilateral Heels  -Apply a Foam dressing to the Coccyx area every 72 hours as needed  -Elevate/float the patients heels using heel protectors and reposition the patient every 2hrs using wedges or pillows      Diagnosis: Moderate protein-calorie malnutrition  Assessment and Plan of Treatment: your albumin level was 2.3  take glucerna shakes 2-3 a day     PRINCIPAL DISCHARGE DIAGNOSIS  Diagnosis: Septic shock  Assessment and Plan of Treatment: you initially came into the hospital after being found on floor with confusion  you were admitted to the ICU and found to have Sepsis caused by a UTI.   you were given a course of antibiotics.   follow up in rehab to monitor your leukocytosis, upon discharge your white blood cell count was 14k  Sepsis happens when an infection spreads and causes your body to react strongly to germs. Your body's defense system normally releases chemicals to fight off infection at the infected area. When infection spreads, chemicals are released throughout your body. The chemicals cause inflammation and clotting in small blood vessels that is difficult to control. Inflammation and clotting decrease blood flow and oxygen to your organs. This may cause your organs to stop working correctly. Sepsis is a life-threatening emergency.  if you have any pain or low grade temperature of 100.4F, you can take tylenol 650 mg every 6 hours as needed      SECONDARY DISCHARGE DIAGNOSES  Diagnosis: Acute UTI  Assessment and Plan of Treatment: you came into the hospital with a UTI, you are to continue taking:       Diagnosis: Acute renal failure  Assessment and Plan of Treatment: you initially found to have elevated creatinine of 12.6 and you were given emergent dialysis and improved now to 1.45  follow up with Nephrology Dr. Schultz in 1-2 weeks  Avoid taking (NSAIDs) - (ex: Ibuprofen, Advil, Celebrex, Naprosyn)  Avoid taking any nephrotoxic agents (can harm kidneys) - Intravenous contrast for diagnostic testing, combination cold medications.  Have all medications adjusted for your renal function by your Health Care Provider.  Blood pressure control is important.  Take all medication as prescribed.      Diagnosis: Anemia of renal disease  Assessment and Plan of Treatment: upon discharge your hemoglobin was 9.2, this was due to your chronic kidney disease  follow up with Nephrologist Dr. Schultz  Eat healthy foods rich in iron and vitamin C. Nuts, meat, dark leafy green vegetables, and beans are high in iron and protein. Vitamin C helps your body absorb iron. Foods rich in vitamin C include oranges and other citrus fruits. Ask your healthcare provider for a list of other foods that are high in iron or vitamin C. Ask if you need to be on a special diet.  Call your local emergency number (911 in the [place]), or have someone call if:  You lose consciousness.  You have severe chest pain.  When should I seek immediate care?  You have dark or bloody bowel movements.      Diagnosis: Electrolyte abnormality  Assessment and Plan of Treatment: you were found to have multiple electrolyte abnormalities due to colon obstruction and renal failure  you were given IV fluids and electrolyte replacements    Diagnosis: Metabolic encephalopathy  Assessment and Plan of Treatment: you initially came into the hospital with altered mental status, this was caused by renal failure, electrolyte imbalance and bowel obstruction  you had a CT angiogram of your head and neck that did not show any acute findings  you are now back to your baseline after dialysis    Diagnosis: Left inguinal hernia  Assessment and Plan of Treatment: you had a CT scan that showed a large left inguinal hernia  this was manually reduced by Surgery Team  please follow up with Surgeon Dr. Toro if you notice your inguinal hernia comes back, becomes enlarged, or if you have any worsening groin pain.    Diagnosis: Atrial fibrillation with RVR  Assessment and Plan of Treatment: during your hospitalization you were found to have a fast and irregular heart rate due to recent infection, you were given a short duration of a blood thinner called heparin to prevent blood clots.   after your infection was resolved your heart rate normalized and you did not need any further blood thinning medication    Diagnosis: DM (diabetes mellitus)  Assessment and Plan of Treatment: your diabetes is well controlled.   your hemoglobin a1c was 5.9%  you were placed on a low dose insulin sliding scale in the hospital.   Make sure you get your HgA1c checked every three months.      Diagnosis: HTN (hypertension)  Assessment and Plan of Treatment: your blood pressure ranged between: (105/43 - 163/73)  continue taking amlodipine 5 mg daily  hold off on taking benazepril until your kidney function improves   follow up with your primary care doctor or cardiologist.  try to take your blood pressure readings twice a day, morning and night time.   Notify your doctor if you have any of the following symptoms:   Dizziness, Lightheadedness, Blurry vision, Headache, Chest pain, Shortness of breath      Diagnosis: Benign prostatic hyperplasia with urinary retention  Assessment and Plan of Treatment: you have a history of enlarged prostate  during your hospitalization you failed trial of void  you had a guerra placed since 10/25/23   please follow up with Urologist Dr. Chauhan to do a trial of void and remove guerra    Diagnosis: Pressure ulcer  Assessment and Plan of Treatment: you were found to have a Stage 1 Pressure Injury to the Coccyx and Bilateral Heels  -Apply a Foam dressing to the Coccyx area every 72 hours as needed  -Elevate/float the patients heels using heel protectors and reposition the patient every 2hrs using wedges or pillows      Diagnosis: Moderate protein-calorie malnutrition  Assessment and Plan of Treatment: your albumin level was 2.3  take glucerna shakes 2-3 a day     PRINCIPAL DISCHARGE DIAGNOSIS  Diagnosis: Septic shock  Assessment and Plan of Treatment: you initially came into the hospital after being found on floor with confusion  you were admitted to the ICU and found to have Sepsis caused by a UTI.   you were given a course of antibiotics.   follow up in rehab to monitor your leukocytosis, upon discharge your white blood cell count was 14k  Sepsis happens when an infection spreads and causes your body to react strongly to germs. Your body's defense system normally releases chemicals to fight off infection at the infected area. When infection spreads, chemicals are released throughout your body. The chemicals cause inflammation and clotting in small blood vessels that is difficult to control. Inflammation and clotting decrease blood flow and oxygen to your organs. This may cause your organs to stop working correctly. Sepsis is a life-threatening emergency.  if you have any pain or low grade temperature of 100.4F, you can take tylenol 650 mg every 6 hours as needed      SECONDARY DISCHARGE DIAGNOSES  Diagnosis: Metabolic encephalopathy  Assessment and Plan of Treatment: you initially came into the hospital with altered mental status, this was caused by renal failure, electrolyte imbalance and bowel obstruction  you had a CT angiogram of your head and neck that did not show any acute findings  you are now back to your baseline after dialysis    Diagnosis: Left inguinal hernia  Assessment and Plan of Treatment: you had a CT scan that showed a large left inguinal hernia  this was manually reduced by Surgery Team  please follow up with Surgeon Dr. Toro if you notice your inguinal hernia comes back, becomes enlarged, or if you have any worsening groin pain.    Diagnosis: DM (diabetes mellitus)  Assessment and Plan of Treatment: your diabetes is well controlled.   your hemoglobin a1c was 5.9%  you were placed on a low dose insulin sliding scale in the hospital.   Make sure you get your HgA1c checked every three months.      Diagnosis: Benign prostatic hyperplasia with urinary retention  Assessment and Plan of Treatment: you have a history of enlarged prostate  during your hospitalization you failed trial of void  you had a guerra placed since 10/25/23   please follow up with Urologist Dr. Chauhan to do a trial of void and remove guerra    Diagnosis: Acute renal failure  Assessment and Plan of Treatment: you initially found to have elevated creatinine of 12.6 and you were given emergent dialysis and improved now to 1.45  follow up with Nephrology Dr. Schultz in 1-2 weeks  Avoid taking (NSAIDs) - (ex: Ibuprofen, Advil, Celebrex, Naprosyn)  Avoid taking any nephrotoxic agents (can harm kidneys) - Intravenous contrast for diagnostic testing, combination cold medications.  Have all medications adjusted for your renal function by your Health Care Provider.  Blood pressure control is important.  Take all medication as prescribed.      Diagnosis: Acute UTI  Assessment and Plan of Treatment: You wer etreated for a urinary tract infection while in the hospital. you complete your course of antibiotics.   Drink enough water and fluids to keep your urine clear or pale yellow.  Avoid caffeine, tea, and carbonated beverages. They tend to irritate your bladder.  Empty your bladder often. Avoid holding urine for long periods of time.  SEEK MEDICAL CARE IF:  You have back pain.  You develop a fever.  Your symptoms do not begin to resolve within 3 days.  SEEK IMMEDIATE MEDICAL CARE IF:  You have severe back pain or lower abdominal pain.  You develop chills.  You have nausea or vomiting.  You have continued burning or discomfort with urination.      Diagnosis: Electrolyte abnormality  Assessment and Plan of Treatment: you were found to have multiple electrolyte abnormalities due to colon obstruction and renal failure  you were given IV fluids and electrolyte replacements    Diagnosis: Atrial fibrillation with RVR  Assessment and Plan of Treatment: during your hospitalization you were found to have a fast and irregular heart rate due to recent infection, you were given a short duration of a blood thinner called heparin to prevent blood clots.   after your infection was resolved your heart rate normalized and you did not need any further blood thinning medication    Diagnosis: HTN (hypertension)  Assessment and Plan of Treatment: your blood pressure ranged between: (105/43 - 163/73)  continue taking amlodipine 5 mg daily  hold off on taking benazepril until your kidney function improves   follow up with your primary care doctor or cardiologist.  try to take your blood pressure readings twice a day, morning and night time.   Notify your doctor if you have any of the following symptoms:   Dizziness, Lightheadedness, Blurry vision, Headache, Chest pain, Shortness of breath      Diagnosis: Pressure ulcer  Assessment and Plan of Treatment: you were found to have a Stage 1 Pressure Injury to the Coccyx and Bilateral Heels  -Apply a Foam dressing to the Coccyx area every 72 hours as needed  -Elevate/float the patients heels using heel protectors and reposition the patient every 2hrs using wedges or pillows      Diagnosis: Anemia of renal disease  Assessment and Plan of Treatment: upon discharge your hemoglobin was 9.2, this was due to your chronic kidney disease  follow up with Nephrologist Dr. Schultz  Eat healthy foods rich in iron and vitamin C. Nuts, meat, dark leafy green vegetables, and beans are high in iron and protein. Vitamin C helps your body absorb iron. Foods rich in vitamin C include oranges and other citrus fruits. Ask your healthcare provider for a list of other foods that are high in iron or vitamin C. Ask if you need to be on a special diet.  Call your local emergency number (911 in the [place]), or have someone call if:  You lose consciousness.  You have severe chest pain.  When should I seek immediate care?  You have dark or bloody bowel movements.      Diagnosis: Moderate protein-calorie malnutrition  Assessment and Plan of Treatment: your albumin level was 2.3  take glucerna shakes 2-3 a day     PRINCIPAL DISCHARGE DIAGNOSIS  Diagnosis: Septic shock  Assessment and Plan of Treatment: You initially came into the hospital after being found on floor with confusion and you were admitted to the ICU due to Sepsis caused by a UTI. You were treated with antibiotics. and your symptoms improved. Please follow up with your   follow up in rehab to monitor your leukoc  Sepsis happens when an infection spreads and causes your body to react strongly to germs. Your body's defense system normally releases chemicals to fight off infection at the infected area. When infection spreads, chemicals are released throughout your body. The chemicals cause inflammation and clotting in small blood vessels that is difficult to control. Inflammation and clotting decrease blood flow and oxygen to your organs. This may cause your organs to stop working correctly. Sepsis is a life-threatening emergency.  if you have any pain or low grade temperature of 100.4F, you can take tylenol 650 mg every 6 hours as needed      SECONDARY DISCHARGE DIAGNOSES  Diagnosis: Metabolic encephalopathy  Assessment and Plan of Treatment: you initially came into the hospital with altered mental status, this was caused by renal failure, electrolyte imbalance and bowel obstruction.You had a CT angiogram of your head and neck that did not show any acute findings  you are now back to your baseline after dialysis    Diagnosis: Left inguinal hernia  Assessment and Plan of Treatment: you had a CT scan that showed a large left inguinal hernia  this was manually reduced by Surgery Team  please follow up with Surgeon Dr. Toro if you notice your inguinal hernia comes back, becomes enlarged, or if you have any worsening groin pain.    Diagnosis: DM (diabetes mellitus)  Assessment and Plan of Treatment: your diabetes is well controlled.   your hemoglobin a1c was 5.9%  you were placed on a low dose insulin sliding scale in the hospital.   Make sure you get your HgA1c checked every three months.      Diagnosis: Benign prostatic hyperplasia with urinary retention  Assessment and Plan of Treatment: you have a history of enlarged prostate  during your hospitalization you failed trial of void  you had a guerra placed since 10/25/23   please follow up with Urologist Dr. Chauhan to do a trial of void and remove guerra    Diagnosis: Acute renal failure  Assessment and Plan of Treatment: you initially found to have elevated creatinine of 12.6 and you were given emergent dialysis and improved now to 1.45  follow up with Nephrology Dr. Schultz in 1-2 weeks  Avoid taking (NSAIDs) - (ex: Ibuprofen, Advil, Celebrex, Naprosyn)  Avoid taking any nephrotoxic agents (can harm kidneys) - Intravenous contrast for diagnostic testing, combination cold medications.  Have all medications adjusted for your renal function by your Health Care Provider.  Blood pressure control is important.  Take all medication as prescribed.      Diagnosis: Acute UTI  Assessment and Plan of Treatment: You wer etreated for a urinary tract infection while in the hospital. you complete your course of antibiotics.   Drink enough water and fluids to keep your urine clear or pale yellow.  Avoid caffeine, tea, and carbonated beverages. They tend to irritate your bladder.  Empty your bladder often. Avoid holding urine for long periods of time.  SEEK MEDICAL CARE IF:  You have back pain.  You develop a fever.  Your symptoms do not begin to resolve within 3 days.  SEEK IMMEDIATE MEDICAL CARE IF:  You have severe back pain or lower abdominal pain.  You develop chills.  You have nausea or vomiting.  You have continued burning or discomfort with urination.      Diagnosis: Electrolyte abnormality  Assessment and Plan of Treatment: you were found to have multiple electrolyte abnormalities due to colon obstruction and renal failure  you were given IV fluids and electrolyte replacements    Diagnosis: Atrial fibrillation with RVR  Assessment and Plan of Treatment: during your hospitalization you were found to have a fast and irregular heart rate due to recent infection, you were given a short duration of a blood thinner called heparin to prevent blood clots.   after your infection was resolved your heart rate normalized and you did not need any further blood thinning medication    Diagnosis: HTN (hypertension)  Assessment and Plan of Treatment: your blood pressure ranged between: (105/43 - 163/73)  continue taking amlodipine 5 mg daily  hold off on taking benazepril until your kidney function improves   follow up with your primary care doctor or cardiologist.  try to take your blood pressure readings twice a day, morning and night time.   Notify your doctor if you have any of the following symptoms:   Dizziness, Lightheadedness, Blurry vision, Headache, Chest pain, Shortness of breath      Diagnosis: Pressure ulcer  Assessment and Plan of Treatment: you were found to have a Stage 1 Pressure Injury to the Coccyx and Bilateral Heels  -Apply a Foam dressing to the Coccyx area every 72 hours as needed  -Elevate/float the patients heels using heel protectors and reposition the patient every 2hrs using wedges or pillows      Diagnosis: Anemia of renal disease  Assessment and Plan of Treatment: upon discharge your hemoglobin was 9.2, this was due to your chronic kidney disease  follow up with Nephrologist Dr. Schultz. Discontinue metformin as this can lead to dangerous levels of lactic acid build up in the setting of renal failure.  Eat healthy foods rich in iron and vitamin C. Nuts, meat, dark leafy green vegetables, and beans are high in iron and protein. Vitamin C helps your body absorb iron. Foods rich in vitamin C include oranges and other citrus fruits. Ask your healthcare provider for a list of other foods that are high in iron or vitamin C. Ask if you need to be on a special diet.  Call your local emergency number (911 in the [place]), or have someone call if:  You lose consciousness.  You have severe chest pain.  When should I seek immediate care?  You have dark or bloody bowel movements.      Diagnosis: Moderate protein-calorie malnutrition  Assessment and Plan of Treatment: your albumin level was 2.3  take glucerna shakes 2-3 a day

## 2023-10-27 NOTE — PROGRESS NOTE ADULT - RESPIRATORY
normal/clear to auscultation bilaterally/no wheezes/no rales/no rhonchi
clear to auscultation bilaterally/no wheezes/no rales/no rhonchi

## 2023-10-27 NOTE — PROGRESS NOTE ADULT - PROBLEM SELECTOR PLAN 3
#Incarcerated Ventral Hernia  #Large Bowel Obstruction  - CT Abd: Large left inguinal hernia containing colon. There is dilatation of the colon proximal to the hernia suggestive of large bowel obstruction.  - s/p NG tube  - tolerating diet- will advance slowing  - cont zosyn 4/7  - surgery following
#Incarcerated Ventral Hernia with Large Bowel Obstruction  - CT Abd: Large left inguinal hernia containing colon. There is dilatation of the colon proximal to the hernia suggestive of large bowel obstruction.  - s/p NG tube  - tolerating diet  - cont zosyn 5/7  - surgery following
#Incarcerated Ventral Hernia  #Large Bowel Obstruction  - CT Abd: Large left inguinal hernia containing colon. There is dilatation of the colon proximal to the hernia suggestive of large bowel obstruction.  - s/p NG tube  - tolerating diet- will advance slowing  - cont zosyn 6/7  - surgery following

## 2023-10-27 NOTE — PROGRESS NOTE ADULT - PROBLEM SELECTOR PLAN 6
- home meds - metformin, glipizide  -will switch to S/S - pt is eating now

## 2023-10-27 NOTE — PROGRESS NOTE ADULT - PROBLEM SELECTOR PLAN 7
#New onset Atrial fibrillation w/ RVR (resolved)  - Likely in setting of sepsis.   - s/p amiodarone and heparin. Now D/C as patient converted to sinus rhythm.   - EKG 10/23/23 - EKG: NSR

## 2023-10-27 NOTE — DISCHARGE NOTE PROVIDER - CARE PROVIDER_API CALL
Sandie Valle  Internal Medicine  [address] [place] [address]  Phone: [phone]  Fax: [phone]  Follow Up Time: 1 week    Amarilis Toro  Colon/Rectal Surgery  [address] [place] [address]  Phone: [phone]  Fax: [phone]  Follow Up Time: Routine    Nixon Schultz  Nephrology  [address] [place] [address]  Phone: [phone]  Fax: [phone]  Follow Up Time: 1 week   Sandie Valle  Internal Medicine  [address] [place] [address]  Phone: [phone]  Fax: [phone]  Follow Up Time: 1 week    Amarilis Toro  Colon/Rectal Surgery  [address] [place] [address]  Phone: [phone]  Fax: [phone]  Follow Up Time: Routine    Nixon Schultz  Nephrology  [address] [place] [address]  Phone: [phone]  Fax: [phone]  Follow Up Time: 1 week    Tank Chauhan  Urology  [address] [facility] [place] [address] [place] [address]  Phone: [phone]  Fax: [phone]  Follow Up Time: 2 months

## 2023-10-27 NOTE — PROCEDURE NOTE - PRACTITIONER PERFORMING THE TIME OUT
Jaylen Escalante NP
[facility]
Dr. Gibbs and [facility] [place]
Jaylen Escalante NP

## 2023-10-27 NOTE — PROGRESS NOTE ADULT - ASSESSMENT
Patient is a 79 y/o M, lives alone at home, ambulates w/ a cane. He has PMHx of HTN, DM, BPH. Admitted for acute renal failure, s/p dialysis x1, nephro following.

## 2023-10-27 NOTE — PROGRESS NOTE ADULT - SUBJECTIVE AND OBJECTIVE BOX
Patient is a 78y old  Male who presents with a chief complaint of acute renal failure (26 Oct 2023 12:31)      INTERVAL HPI/OVERNIGHT EVENTS: No new events overnight.      REVIEW OF SYSTEMS:  CONSTITUTIONAL: No fever, chills  ENMT:  No difficulty hearing, no change in vision  NECK: No pain or stiffness  RESPIRATORY: No cough, SOB  CARDIOVASCULAR: No chest pain, palpitations  GASTROINTESTINAL: No abdominal pain. No nausea, vomiting, or diarrhea  GENITOURINARY: No dysuria  NEUROLOGICAL: No HA  SKIN: No itching, burning, rashes, or lesions   LYMPH NODES: No enlarged glands  ENDOCRINE: No heat or cold intolerance; No hair loss  MUSCULOSKELETAL: No joint pain or swelling; No muscle, back, or extremity pain  PSYCHIATRIC: No depression, anxiety  HEME/LYMPH: No easy bruising, or bleeding gums    T(C): 36.7 (10-27-23 @ 05:09), Max: 36.7 (10-26-23 @ 13:50)  HR: 71 (10-27-23 @ 05:09) (71 - 90)  BP: 155/66 (10-27-23 @ 05:09) (105/43 - 163/73)  RR: 18 (10-27-23 @ 05:09) (18 - 18)  SpO2: 96% (10-27-23 @ 05:09) (95% - 96%)  Wt(kg): --Vital Signs Last 24 Hrs  T(C): 36.7 (27 Oct 2023 05:09), Max: 36.7 (26 Oct 2023 13:50)  T(F): 98 (27 Oct 2023 05:09), Max: 98 (26 Oct 2023 13:50)  HR: 71 (27 Oct 2023 05:09) (71 - 90)  BP: 155/66 (27 Oct 2023 05:09) (105/43 - 163/73)  BP(mean): --  RR: 18 (27 Oct 2023 05:09) (18 - 18)  SpO2: 96% (27 Oct 2023 05:09) (95% - 96%)    Parameters below as of 27 Oct 2023 05:09  Patient On (Oxygen Delivery Method): room air    MEDICATIONS  (STANDING):  amLODIPine   Tablet 5 milliGRAM(s) Oral daily  bacitracin   Ointment 1 Application(s) Topical two times a day  chlorhexidine 2% Cloths 1 Application(s) Topical <User Schedule>  dextrose 5%. 1000 milliLiter(s) (50 mL/Hr) IV Continuous <Continuous>  dextrose 5%. 1000 milliLiter(s) (100 mL/Hr) IV Continuous <Continuous>  dextrose 5%. 1000 milliLiter(s) (200 mL/Hr) IV Continuous <Continuous>  dextrose 5%. 1000 milliLiter(s) (100 mL/Hr) IV Continuous <Continuous>  dextrose 50% Injectable 25 Gram(s) IV Push once  dextrose 50% Injectable 12.5 Gram(s) IV Push once  dextrose 50% Injectable 25 Gram(s) IV Push once  glucagon  Injectable 1 milliGRAM(s) IntraMuscular once  heparin   Injectable 5000 Unit(s) SubCutaneous every 8 hours  insulin lispro (ADMELOG) corrective regimen sliding scale   SubCutaneous three times a day before meals  insulin lispro (ADMELOG) corrective regimen sliding scale   SubCutaneous at bedtime  lactated ringers. 1000 milliLiter(s) (100 mL/Hr) IV Continuous <Continuous>  pantoprazole    Tablet 40 milliGRAM(s) Oral before breakfast  piperacillin/tazobactam IVPB.. 3.375 Gram(s) IV Intermittent every 12 hours    MEDICATIONS  (PRN):  dextrose Oral Gel 15 Gram(s) Oral once PRN Blood Glucose LESS THAN 70 milliGRAM(s)/deciliter  sodium chloride 0.9% Bolus. 100 milliLiter(s) IV Bolus every 5 minutes PRN SBP LESS THAN or EQUAL to 90 mmHg  sodium chloride 0.9% lock flush 10 milliLiter(s) IV Push every 1 hour PRN Pre/post blood products, medications, blood draw, and to maintain line patency      PHYSICAL EXAM:  GENERAL: NAD  EYES: clear conjunctiva; EOMI  ENMT: Moist mucous membranes  NECK: Supple, No JVD, Normal thyroid  CHEST/LUNG: Clear to auscultation bilaterally; No rales, rhonchi, wheezing, or rubs  HEART: S1, S2, Regular rate and rhythm  ABDOMEN: Soft, Nontender, Nondistended; Bowel sounds present  NEURO: Alert & Oriented X3  EXTREMITIES: No LE edema, no calf tenderness  LYMPH: No lymphadenopathy noted  SKIN: No rashes or lesions  : guerra clear yellow urine    Consultant(s) Notes Reviewed:  [x ] YES  [ ] NO  Care Discussed with Consultants/Other Providers [ x] YES  [ ] NO    LABS:                        9.2    14.87 )-----------( 165      ( 27 Oct 2023 05:26 )             28.0     10-27    144  |  110<H>  |  28<H>  ----------------------------<  173<H>  3.6   |  26  |  1.45<H>    Ca    7.6<L>      27 Oct 2023 05:26    TPro  5.2<L>  /  Alb  2.3<L>  /  TBili  0.5  /  DBili  x   /  AST  19  /  ALT  32  /  AlkPhos  72  10-27      CAPILLARY BLOOD GLUCOSE      POCT Blood Glucose.: 209 mg/dL (27 Oct 2023 07:40)  POCT Blood Glucose.: 233 mg/dL (26 Oct 2023 23:37)  POCT Blood Glucose.: 284 mg/dL (26 Oct 2023 21:22)  POCT Blood Glucose.: 223 mg/dL (26 Oct 2023 16:49)  POCT Blood Glucose.: 317 mg/dL (26 Oct 2023 11:24)        Urinalysis Basic - ( 27 Oct 2023 05:26 )    Color: x / Appearance: x / SG: x / pH: x  Gluc: 173 mg/dL / Ketone: x  / Bili: x / Urobili: x   Blood: x / Protein: x / Nitrite: x   Leuk Esterase: x / RBC: x / WBC x   Sq Epi: x / Non Sq Epi: x / Bacteria: x        RADIOLOGY & ADDITIONAL TESTS:    Imaging Personally Reviewed:  [ x] YES  [ ] NO  < from: US Renal (10.24.23 @ 10:54) >    ACC: [id number] EXAM:  US KIDNEY(S)   ORDERED BY: EMY AGEE     PROCEDURE DATE:  10/24/2023          INTERPRETATION:  CLINICAL INFORMATION: Hematuria.    COMPARISON: None available.    TECHNIQUE: Sonography of the kidneys and bladder.    FINDINGS:  Right kidney: 11.9 cm.. No renal mass, hydronephrosis or calculi. 1.3 x   1.5 x 1.2 cm cyst in the lower pole of the right kidney.    Left kidney: 11.7 cm. No renal mass, hydronephrosis or calculi.    Urinary bladder: Guerra catheter in the urinarybladder which is   underdistended.    IMPRESSION:  No hydronephrosis.    Small right renal cyst.    --- End of Report ---            SKYLER MORENO MD; Attending Radiologist  This document has been electronically signed. Oct 24 2023  1:07PM    < end of copied text >

## 2023-10-27 NOTE — PROGRESS NOTE ADULT - NS ATTEND AMEND GEN_ALL_CORE FT
78M, from home, ambulates with cane, [facility] BPH presents after being found down. Daughter found patient on floor at home, noted with slurred speech and AMS. Of note, was taking cirpo for UTI. Pt reports that previous to this episode, he was suffering from urinary retention for several days and straining to urinate. On presentation, patient was slightly hypotensive, tachypneic, with kidney failure, elevated lactate, and found w large bowl obstruction from inguinal hernia. Admission to ICU was for shock, suspected sepsis 2/2 UTI. CCB AF RVR. Started on zosyn and briefly on pressors. Underwent emergent HD for renal failure. Encephalopathy improved, hemodynamically stable, and hernia was manually reduced, NGT removed, and started on diet. Pt downgraded to medicine.     AP:  TME, resolved  Shock, possibly sepsis, resolved  pyuria  Acute kidney failure, ATN, suspect 2/2 shock  urinary retention, BPH  hematuria, suspect 2/2 AC and trauma  lactic acidosis from shock, kidney failure, metformin use  hypernatremia, from polyuric phase of ATN  large bowel obstruction, inguinal hernia, manually reduced  symptomatic AF with RVR, in response to shock, resolved  HTN  DM2  hyperglycemia    -TME and shock resolved  -treating shock empirically with zoysyn until 10/28, UCx on admission with no growth  -BCx with no growth  -f/u ID recs  -f/u nephro recs  -strict I/O, trend BMP  -failed TOV  -hernia reduced, advance diet as tolerated  -c/w home flomax and finasteride  -urine no longer bloody, will have pt follow up with Urology  -PT eval  -dvt ppx    Ordered labs: BMP, urine osm, urine sodium  Reviewed labs: BMP, urine osm, urine sodium  Discussed management with: Dr Alicea, Dr. Bravo 78M, from home, ambulates with cane, [facility] BPH presents after being found down. Daughter found patient on floor at home, noted with slurred speech and AMS. Of note, was taking cirpo for UTI. Pt reports that previous to this episode, he was suffering from urinary retention for several days and straining to urinate. On presentation, patient was slightly hypotensive, tachypneic, with kidney failure, elevated lactate, and found w large bowl obstruction from inguinal hernia. Admission to ICU was for shock, suspected sepsis 2/2 UTI. CCB AF RVR. Started on zosyn and briefly on pressors. Underwent emergent HD for renal failure. Encephalopathy improved, hemodynamically stable, and hernia was manually reduced, NGT removed, and started on diet. Pt downgraded to medicine.     AP:  TME, resolved  Shock, possibly sepsis, resolved  pyuria  Acute kidney failure, ATN, suspect 2/2 shock  urinary retention, BPH  hematuria, suspect 2/2 AC and trauma  lactic acidosis from shock, kidney failure, metformin use  hypernatremia, from polyuric phase of ATN  large bowel obstruction, inguinal hernia, manually reduced  symptomatic AF with RVR, in response to shock, resolved  HTN  DM2  hyperglycemia    -TME and shock resolved  -treating shock empirically with zoysyn until 10/28, UCx on admission with no growth  -BCx with no growth  -f/u ID recs  -f/u nephro recs  -strict I/O, trend BMP  -failed TOV  -hernia reduced, advance diet as tolerated  -c/w home flomax and finasteride  -urine no longer bloody, will have pt follow up with Urology  -PT eval  -dvt ppx    Ordered labs: JUAN PABLO  Reviewed labs: JUAN PABLO  Discussed management with: Dr. Bravo

## 2023-10-27 NOTE — PROGRESS NOTE ADULT - ASSESSMENT
AKIN:  Likely ATN from sepsis. No evidence of obstruction.  S/P HD on 10/22. He is voiding > 70 ml/hour.  - Monitor BMP.  - Avoid nephrotoxins.  - Will hold HD as he has recovered renal function.  - Remove temporary HD catheter.        Metabolic Alkalosis:  Initially, he had High AG Metabolic Acidosis, likely Lactic Acidosis form septic shock.   This has resolved and he is now Alkalotic. Possible due to volume contraction or wasting of Anions in the urine.  - Follow up BMP.    Hypernatremia:  Due to volume contraction. He has post AKIN polyuria, likely hypoosmotic.  Improved now.  - Send urine OSM.  - Hypotonic fluids.        Hypokalemia:  - Supplement as needed.    Incarcerated Hernia:  - Surgery follow up

## 2023-10-27 NOTE — DISCHARGE NOTE PROVIDER - NSDCMRMEDTOKEN_GEN_ALL_CORE_FT
amoxicillin-clavulanate 875 mg-125 mg oral tablet: 1 tab(s) orally 2 times a day  glyBURIDE:  orally   metformin:  orally    benazepril 40 mg oral tablet: 1 tab(s) orally once a day  Flomax 0.4 mg oral capsule: 1 cap(s) orally once a day  glipiZIDE 5 mg oral tablet: 1 tab(s) orally 2 times a day  metFORMIN 1000 mg oral tablet: 1 tab(s) orally 2 times a day  Norvasc 10 mg oral tablet: 1 tab(s) orally once a day  pantoprazole 40 mg oral delayed release tablet: 1 tab(s) orally once a day (before a meal)  Proscar 5 mg oral tablet: 1 tab(s) orally once a day   amLODIPine 5 mg oral tablet: 1 tab(s) orally once a day  Flomax 0.4 mg oral capsule: 1 cap(s) orally once a day  glipiZIDE 5 mg oral tablet: 1 tab(s) orally 2 times a day  metFORMIN 1000 mg oral tablet: 1 tab(s) orally 2 times a day  pantoprazole 40 mg oral delayed release tablet: 1 tab(s) orally once a day (before a meal)  Proscar 5 mg oral tablet: 1 tab(s) orally once a day   amLODIPine 5 mg oral tablet: 1 tab(s) orally once a day  Flomax 0.4 mg oral capsule: 1 cap(s) orally once a day  glipiZIDE 5 mg oral tablet: 1 tab(s) orally 2 times a day  pantoprazole 40 mg oral delayed release tablet: 1 tab(s) orally once a day (before a meal)  Proscar 5 mg oral tablet: 1 tab(s) orally once a day

## 2023-10-27 NOTE — DISCHARGE NOTE PROVIDER - HOSPITAL COURSE
77 y/o M, lives alone at home, ambulates w/ a cane. He has PMHx of HTN, DM, BPH.   Pt presents to hospital after being found down. Daughter found patient on floor at home, noted with slurred speech and AMS. Of note, was taking cipro for UTI. Pt reports that previous to this episode, he was suffering from urinary retention for several days and straining to urinate. On presentation, patient was slightly hypotensive, tachypneic, with kidney failure, elevated lactate, and found w large bowl obstruction from inguinal hernia. Admission to ICU was for shock, suspected sepsis 2/2 UTI. complicated by Atrial fibrillation with RVR. Started on zosyn and briefly on pressors. Underwent emergent HD for renal failure. CT abdomen/pelvis showed large left inguinal hernia containing colon. There is dilatation of the colon proximal to the hernia suggestive of large bowel obstruction. Surgery team reduced the hernia manually at bedside. Patient was also started on NGT on suction.    Encephalopathy improved, hemodynamically stable, and started on diet. Pt downgraded to medicine.   urine culture negative. Blood cultures negative.   CT Angio head and neck for negative for acute stroke.     Pt also developed hypernatremia from hypovolemia, polyuric phase of ATN.     Lastly, pt had urinary retention 2/2 BPH, failed trial of void, guerra was placed on 10/25 and pt will follow up outpatient with Urology Dr. Chauhan.    Pt is now medically optimized and awaiting AMAIRANI. 79 y/o M, lives alone at home, ambulates w/ a cane. He has PMHx of HTN, DM, BPH.   Pt presents to hospital after being found down. Daughter found patient on floor at home, noted with slurred speech and AMS. Of note, was taking cipro for UTI. Pt reports that previous to this episode, he was suffering from urinary retention for several days and straining to urinate. On presentation, patient was slightly hypotensive, tachypneic, with kidney failure, elevated lactate, and found w large bowl obstruction from inguinal hernia. Admission to ICU was for shock, suspected sepsis 2/2 UTI. complicated by Atrial fibrillation with RVR. Started on zosyn and briefly on pressors. Underwent emergent HD for renal failure. CT abdomen/pelvis showed large left inguinal hernia containing colon. There is dilatation of the colon proximal to the hernia suggestive of large bowel obstruction. Surgery team reduced the hernia manually at bedside. Patient was also started on NGT on suction.    Encephalopathy improved, hemodynamically stable, and started on diet. Pt downgraded to medicine.   urine culture negative. Blood cultures negative.   CT Angio head and neck for negative for acute stroke.     Pt also developed hypernatremia from hypovolemia, polyuric phase of ATN.     Lastly, pt had urinary retention 2/2 BPH, failed trial of void, guerra was placed on 10/25 and pt will follow up outpatient with Urology Dr. Chauhan.    Pt is now medically optimized and awaiting AMAIRANI.   Please note that this a brief summary of hospital course please refer to daily progress notes and consult notes for full course and events 79 y/o M, lives alone at home, ambulates w/ a cane. He has PMHx of HTN, DM, BPH.   Pt presents to hospital after being found down. Daughter found patient on floor at home, noted with slurred speech and AMS. Of note, was taking cipro for UTI. Pt reports that previous to this episode, he was suffering from urinary retention for several days and straining to urinate. On presentation, patient was hypotensive, tachypneic, with kidney failure, elevated lactate, and found w large bowl obstruction from an inguinal hernia. Admission to ICU was for shock, suspected sepsis 2/2 UTI, complicated by Atrial fibrillation with RVR. Started on zosyn and briefly on pressors. Underwent emergent HD for renal failure. CT abdomen/pelvis showed large left inguinal hernia containing colon. There is dilatation of the colon proximal to the hernia suggestive of large bowel obstruction. Surgery team reduced the hernia manually at bedside. Patient was also started on NGT on suction.    Encephalopathy improved, hemodynamically stable, and started on diet. Pt downgraded to medicine.   Urine and blood cultures were negative  CT Angio head and neck negative for acute stroke.     Pt also developed hypernatremia from hypovolemia due to polyuric phase of Acute Tubular Necrosis.     Lastly, pt had urinary retention 2/2 BPH, failed trial of void, guerra was placed on 10/25 and pt will follow up outpatient with Urology Dr. Chauhan.    Pt is now medically optimized to be discharged to [facility].   Please note that this a brief summary of hospital course please refer to daily progress notes and consult notes for full course and events 77 y/o M, lives alone at home, ambulates w/ a cane. He has PMHx of HTN, DM, BPH.   Pt presents to hospital after being found down. Daughter found patient on floor at home, noted with slurred speech and AMS. Of note, was taking cipro for UTI. Pt reports that previous to this episode, he was suffering from urinary retention for several days and straining to urinate. On presentation, patient was hypotensive, tachypneic, with kidney failure, elevated lactate, and found w large bowl obstruction from an inguinal hernia. Admission to ICU was for shock, suspected sepsis 2/2 UTI, complicated by Atrial fibrillation with RVR. Started on zosyn and briefly on pressors. Underwent emergent HD for renal failure. CT abdomen/pelvis showed large left inguinal hernia containing colon. There is dilatation of the colon proximal to the hernia suggestive of large bowel obstruction. Surgery team reduced the hernia manually at bedside. Patient was also started on NGT on suction.    Encephalopathy improved, hemodynamically stable, and started on diet. Pt downgraded to medicine.   Urine and blood cultures were negative  CT Angio head and neck negative for acute stroke.     Pt also developed hypernatremia from hypovolemia due to polyuric phase of Acute Tubular Necrosis but resolved with hypotonic IVF.     Lastly, pt had urinary retention 2/2 BPH, failed trial of void, guerra was placed on 10/25 and pt will follow up outpatient with Urology Dr. Chauhan.    Pt is now medically optimized to be discharged to [facility].   Please note that this a brief summary of hospital course please refer to daily progress notes and consult notes for full course and events

## 2023-10-27 NOTE — PROGRESS NOTE ADULT - ASSESSMENT
UTI  Leukocytosis   AKIN - improving      Plan - Cont Zosyn 3.375gms iv q12hrs for 1 more day   DC all antibiotics tomorrow.  Reconsult prn.

## 2023-10-27 NOTE — DISCHARGE NOTE PROVIDER - CARE PROVIDERS DIRECT ADDRESSES
,DirectAddress_Unknown,DirectAddress_Unknown,[email or web address] ,DirectAddress_Unknown,DirectAddress_Unknown,[email or web address],DirectAddress_Unknown

## 2023-10-27 NOTE — DISCHARGE NOTE PROVIDER - PROVIDER TOKENS
PROVIDER:[TOKEN:[[id number]],FOLLOWUP:[1 week]],PROVIDER:[TOKEN:[[id number]],FOLLOWUP:[Routine]],PROVIDER:[TOKEN:[[id number]],FOLLOWUP:[1 week]] PROVIDER:[TOKEN:[[id number]],FOLLOWUP:[1 week]],PROVIDER:[TOKEN:[[id number]],FOLLOWUP:[Routine]],PROVIDER:[TOKEN:[[id number]],FOLLOWUP:[1 week]],PROVIDER:[TOKEN:[[id number]],FOLLOWUP:[2 months]]

## 2023-10-27 NOTE — PROGRESS NOTE ADULT - PROBLEM SELECTOR PLAN 4
- Likely 2/2 to hypovolemia in setting of polyuric phase.   - nephro following   - trend Na

## 2023-10-28 LAB
ANION GAP SERPL CALC-SCNC: 6 MMOL/L — SIGNIFICANT CHANGE UP (ref 5–17)
ANION GAP SERPL CALC-SCNC: 6 MMOL/L — SIGNIFICANT CHANGE UP (ref 5–17)
BUN SERPL-MCNC: 26 MG/DL — HIGH (ref 7–18)
BUN SERPL-MCNC: 26 MG/DL — HIGH (ref 7–18)
CALCIUM SERPL-MCNC: 8.2 MG/DL — LOW (ref 8.4–10.5)
CALCIUM SERPL-MCNC: 8.2 MG/DL — LOW (ref 8.4–10.5)
CHLORIDE SERPL-SCNC: 108 MMOL/L — SIGNIFICANT CHANGE UP (ref 96–108)
CHLORIDE SERPL-SCNC: 108 MMOL/L — SIGNIFICANT CHANGE UP (ref 96–108)
CO2 SERPL-SCNC: 26 MMOL/L — SIGNIFICANT CHANGE UP (ref 22–31)
CO2 SERPL-SCNC: 26 MMOL/L — SIGNIFICANT CHANGE UP (ref 22–31)
CREAT SERPL-MCNC: 1.52 MG/DL — HIGH (ref 0.5–1.3)
CREAT SERPL-MCNC: 1.52 MG/DL — HIGH (ref 0.5–1.3)
EGFR: 47 ML/MIN/1.73M2 — LOW
EGFR: 47 ML/MIN/1.73M2 — LOW
GLUCOSE BLDC GLUCOMTR-MCNC: 195 MG/DL — HIGH (ref 70–99)
GLUCOSE BLDC GLUCOMTR-MCNC: 195 MG/DL — HIGH (ref 70–99)
GLUCOSE BLDC GLUCOMTR-MCNC: 220 MG/DL — HIGH (ref 70–99)
GLUCOSE BLDC GLUCOMTR-MCNC: 220 MG/DL — HIGH (ref 70–99)
GLUCOSE BLDC GLUCOMTR-MCNC: 257 MG/DL — HIGH (ref 70–99)
GLUCOSE BLDC GLUCOMTR-MCNC: 257 MG/DL — HIGH (ref 70–99)
GLUCOSE BLDC GLUCOMTR-MCNC: 378 MG/DL — HIGH (ref 70–99)
GLUCOSE BLDC GLUCOMTR-MCNC: 378 MG/DL — HIGH (ref 70–99)
GLUCOSE SERPL-MCNC: 221 MG/DL — HIGH (ref 70–99)
GLUCOSE SERPL-MCNC: 221 MG/DL — HIGH (ref 70–99)
HCT VFR BLD CALC: 27.5 % — LOW (ref 39–50)
HCT VFR BLD CALC: 27.5 % — LOW (ref 39–50)
HGB BLD-MCNC: 8.9 G/DL — LOW (ref 13–17)
HGB BLD-MCNC: 8.9 G/DL — LOW (ref 13–17)
MCHC RBC-ENTMCNC: 28.4 PG — SIGNIFICANT CHANGE UP (ref 27–34)
MCHC RBC-ENTMCNC: 28.4 PG — SIGNIFICANT CHANGE UP (ref 27–34)
MCHC RBC-ENTMCNC: 32.4 GM/DL — SIGNIFICANT CHANGE UP (ref 32–36)
MCHC RBC-ENTMCNC: 32.4 GM/DL — SIGNIFICANT CHANGE UP (ref 32–36)
MCV RBC AUTO: 87.9 FL — SIGNIFICANT CHANGE UP (ref 80–100)
MCV RBC AUTO: 87.9 FL — SIGNIFICANT CHANGE UP (ref 80–100)
NRBC # BLD: 0 /100 WBCS — SIGNIFICANT CHANGE UP (ref 0–0)
NRBC # BLD: 0 /100 WBCS — SIGNIFICANT CHANGE UP (ref 0–0)
PLATELET # BLD AUTO: 165 K/UL — SIGNIFICANT CHANGE UP (ref 150–400)
PLATELET # BLD AUTO: 165 K/UL — SIGNIFICANT CHANGE UP (ref 150–400)
POTASSIUM SERPL-MCNC: 4 MMOL/L — SIGNIFICANT CHANGE UP (ref 3.5–5.3)
POTASSIUM SERPL-MCNC: 4 MMOL/L — SIGNIFICANT CHANGE UP (ref 3.5–5.3)
POTASSIUM SERPL-SCNC: 4 MMOL/L — SIGNIFICANT CHANGE UP (ref 3.5–5.3)
POTASSIUM SERPL-SCNC: 4 MMOL/L — SIGNIFICANT CHANGE UP (ref 3.5–5.3)
RBC # BLD: 3.13 M/UL — LOW (ref 4.2–5.8)
RBC # BLD: 3.13 M/UL — LOW (ref 4.2–5.8)
RBC # FLD: 12.9 % — SIGNIFICANT CHANGE UP (ref 10.3–14.5)
RBC # FLD: 12.9 % — SIGNIFICANT CHANGE UP (ref 10.3–14.5)
SODIUM SERPL-SCNC: 140 MMOL/L — SIGNIFICANT CHANGE UP (ref 135–145)
SODIUM SERPL-SCNC: 140 MMOL/L — SIGNIFICANT CHANGE UP (ref 135–145)
WBC # BLD: 14.08 K/UL — HIGH (ref 3.8–10.5)
WBC # BLD: 14.08 K/UL — HIGH (ref 3.8–10.5)
WBC # FLD AUTO: 14.08 K/UL — HIGH (ref 3.8–10.5)
WBC # FLD AUTO: 14.08 K/UL — HIGH (ref 3.8–10.5)

## 2023-10-28 PROCEDURE — 99232 SBSQ HOSP IP/OBS MODERATE 35: CPT

## 2023-10-28 RX ORDER — TAMSULOSIN HYDROCHLORIDE 0.4 MG/1
0.4 CAPSULE ORAL AT BEDTIME
Refills: 0 | Status: DISCONTINUED | OUTPATIENT
Start: 2023-10-28 | End: 2023-10-29

## 2023-10-28 RX ORDER — TAMSULOSIN HYDROCHLORIDE 0.4 MG/1
1 CAPSULE ORAL
Refills: 0 | DISCHARGE

## 2023-10-28 RX ORDER — AMLODIPINE BESYLATE 2.5 MG/1
1 TABLET ORAL
Qty: 30 | Refills: 0
Start: 2023-10-28 | End: 2023-11-26

## 2023-10-28 RX ORDER — FINASTERIDE 5 MG/1
5 TABLET, FILM COATED ORAL DAILY
Refills: 0 | Status: DISCONTINUED | OUTPATIENT
Start: 2023-10-28 | End: 2023-10-31

## 2023-10-28 RX ORDER — FINASTERIDE 5 MG/1
1 TABLET, FILM COATED ORAL
Refills: 0 | DISCHARGE

## 2023-10-28 RX ORDER — PANTOPRAZOLE SODIUM 20 MG/1
1 TABLET, DELAYED RELEASE ORAL
Qty: 30 | Refills: 0
Start: 2023-10-28 | End: 2023-11-26

## 2023-10-28 RX ADMIN — HEPARIN SODIUM 5000 UNIT(S): 5000 INJECTION INTRAVENOUS; SUBCUTANEOUS at 22:06

## 2023-10-28 RX ADMIN — CHLORHEXIDINE GLUCONATE 1 APPLICATION(S): 213 SOLUTION TOPICAL at 06:20

## 2023-10-28 RX ADMIN — PIPERACILLIN AND TAZOBACTAM 25 GRAM(S): 4; .5 INJECTION, POWDER, LYOPHILIZED, FOR SOLUTION INTRAVENOUS at 12:31

## 2023-10-28 RX ADMIN — Medication 5: at 17:16

## 2023-10-28 RX ADMIN — Medication 1: at 22:06

## 2023-10-28 RX ADMIN — HEPARIN SODIUM 5000 UNIT(S): 5000 INJECTION INTRAVENOUS; SUBCUTANEOUS at 06:20

## 2023-10-28 RX ADMIN — TAMSULOSIN HYDROCHLORIDE 0.4 MILLIGRAM(S): 0.4 CAPSULE ORAL at 22:06

## 2023-10-28 RX ADMIN — HEPARIN SODIUM 5000 UNIT(S): 5000 INJECTION INTRAVENOUS; SUBCUTANEOUS at 13:54

## 2023-10-28 RX ADMIN — FINASTERIDE 5 MILLIGRAM(S): 5 TABLET, FILM COATED ORAL at 13:54

## 2023-10-28 RX ADMIN — Medication 1 APPLICATION(S): at 17:57

## 2023-10-28 RX ADMIN — Medication 2: at 11:44

## 2023-10-28 RX ADMIN — Medication 1: at 08:37

## 2023-10-28 RX ADMIN — AMLODIPINE BESYLATE 5 MILLIGRAM(S): 2.5 TABLET ORAL at 06:20

## 2023-10-28 RX ADMIN — PIPERACILLIN AND TAZOBACTAM 25 GRAM(S): 4; .5 INJECTION, POWDER, LYOPHILIZED, FOR SOLUTION INTRAVENOUS at 00:30

## 2023-10-28 RX ADMIN — Medication 1 APPLICATION(S): at 06:22

## 2023-10-28 RX ADMIN — PANTOPRAZOLE SODIUM 40 MILLIGRAM(S): 20 TABLET, DELAYED RELEASE ORAL at 06:20

## 2023-10-28 NOTE — PROGRESS NOTE ADULT - NEGATIVE GENERAL SYMPTOMS
no fever/no chills

## 2023-10-28 NOTE — PROGRESS NOTE ADULT - ASSESSMENT
78M, from home, ambulates with cane, [facility] BPH presents after being found down. Daughter found patient on floor at home, noted with slurred speech and AMS. Of note, was taking cirpo for UTI. Pt reports that previous to this episode, he was suffering from urinary retention for several days and straining to urinate. On presentation, patient was slightly hypotensive, tachypneic, with kidney failure, elevated lactate, and found w large bowl obstruction from inguinal hernia. Admission to ICU was for shock, suspected sepsis 2/2 UTI. CCB AF RVR. Started on zosyn and briefly on pressors. Underwent emergent HD for renal failure. Encephalopathy improved, hemodynamically stable, and hernia was manually reduced, NGT removed, and started on diet. Pt downgraded to medicine.     AP:  TME, resolved  Shock, possibly sepsis, resolved  pyuria  Acute kidney failure, ATN, suspect 2/2 shock  urinary retention, BPH  hematuria, suspect 2/2 AC and trauma  lactic acidosis from shock, kidney failure, metformin use  hypernatremia, from polyuric phase of ATN  large bowel obstruction, inguinal hernia, manually reduced  symptomatic AF with RVR, in response to shock, resolved  HTN  DM2  hyperglycemia    -TME and shock resolved  -s/p 7d empiric zosyn, UCx and BCx on admission with no growth  -electrolytes abnormalities improved, AKIN improved and stable  -failed TOV, will retry today  -urine no longer bloody, If fails TOV, will have pt follow up with Urology  -hernia reduced, advanced diet  -c/w home flomax and finasteride  -PT eval: rec AMAIRANI now  -dvt ppx    Ordered labs: JUAN PABLO  Reviewed labs: BMP  Discussed management with: Dr. Bravo.    dispo: Cleared for dc after last dose of zosyn. Patient and family now want AMAIRANI in [place]. Will need to wait until Monday

## 2023-10-28 NOTE — PROGRESS NOTE ADULT - SUBJECTIVE AND OBJECTIVE BOX
ANITA KOLB  78y  Patient is a 78y old  Male who presents with a chief complaint of acute renal failure (28 Oct 2023 13:08)    HPI:  Followed for AKIN.  No new complaints.      HEALTH ISSUES - PROBLEM Dx:  Metabolic encephalopathy    H/O ventral hernia repair    Recurrent ventral hernia    Hypernatremia    HTN (hypertension)    DM (diabetes mellitus)    Afib    Prophylactic measure    Discharge planning issues          MEDICATIONS  (STANDING):  amLODIPine   Tablet 5 milliGRAM(s) Oral daily  bacitracin   Ointment 1 Application(s) Topical two times a day  chlorhexidine 2% Cloths 1 Application(s) Topical <User Schedule>  dextrose 50% Injectable 12.5 Gram(s) IV Push once  dextrose 50% Injectable 25 Gram(s) IV Push once  dextrose 50% Injectable 25 Gram(s) IV Push once  finasteride 5 milliGRAM(s) Oral daily  glucagon  Injectable 1 milliGRAM(s) IntraMuscular once  heparin   Injectable 5000 Unit(s) SubCutaneous every 8 hours  insulin lispro (ADMELOG) corrective regimen sliding scale   SubCutaneous three times a day before meals  insulin lispro (ADMELOG) corrective regimen sliding scale   SubCutaneous at bedtime  lactated ringers. 1000 milliLiter(s) (100 mL/Hr) IV Continuous <Continuous>  pantoprazole    Tablet 40 milliGRAM(s) Oral before breakfast  piperacillin/tazobactam IVPB.. 3.375 Gram(s) IV Intermittent every 12 hours  tamsulosin 0.4 milliGRAM(s) Oral at bedtime    MEDICATIONS  (PRN):  dextrose Oral Gel 15 Gram(s) Oral once PRN Blood Glucose LESS THAN 70 milliGRAM(s)/deciliter  sodium chloride 0.9% Bolus. 100 milliLiter(s) IV Bolus every 5 minutes PRN SBP LESS THAN or EQUAL to 90 mmHg  sodium chloride 0.9% lock flush 10 milliLiter(s) IV Push every 1 hour PRN Pre/post blood products, medications, blood draw, and to maintain line patency    Vital Signs Last 24 Hrs  T(C): 36.7 (28 Oct 2023 13:51), Max: 37.3 (27 Oct 2023 20:39)  T(F): 98.1 (28 Oct 2023 13:51), Max: 99.1 (27 Oct 2023 20:39)  HR: 78 (28 Oct 2023 13:51) (63 - 78)  BP: 139/56 (28 Oct 2023 13:51) (134/61 - 162/54)  BP(mean): --  RR: 18 (28 Oct 2023 13:51) (18 - 19)  SpO2: 95% (28 Oct 2023 13:51) (95% - 98%)    Parameters below as of 28 Oct 2023 13:51  Patient On (Oxygen Delivery Method): room air      Daily     Daily     PHYSICAL EXAM:  Constitutional: He appears comfortable and not distressed. Not diaphoretic.    Neck:  The thyroid is normal. Trachea is midline.     Breasts: Normal examination.    Respiratory: The lungs are clear to auscultation. No dullness and expansion is normal.    Cardiovascular: S1 and S2 are normal. No murmurs rubs or gallops are present.    Gastrointestinal: The abdomen is soft. No tenderness is present. No masses are present. Bowel sounds are normal.    Genitourinary: The bladder is not distended. No CVA tenderness is present.    Extremities: No edema is noted. No deformities are present.    Neurological: Cognition is normal. Tone, power and sensation are normal. Gait is steady.    Skin: No lesions are seen  or palpated.    Lymph Nodes: No lymphadenopathy is present.                          8.9    14.08 )-----------( 165      ( 28 Oct 2023 05:15 )             27.5     10-28    140  |  108  |  26<H>  ----------------------------<  221<H>  4.0   |  26  |  1.52<H>    Ca    8.2<L>      28 Oct 2023 05:15    TPro  5.2<L>  /  Alb  2.3<L>  /  TBili  0.5  /  DBili  x   /  AST  19  /  ALT  32  /  AlkPhos  72  10-27

## 2023-10-28 NOTE — PROGRESS NOTE ADULT - SUBJECTIVE AND OBJECTIVE BOX
Interval of present illness: No acute events overnight. Pt seen at bedside. No new complaints.     REVIEW OF SYSTEMS:    CONSTITUTIONAL: No fever  EYES: No acute visual disturbances  NECK: No pain or stiffness  RESPIRATORY: No cough; No shortness of breath  CARDIOVASCULAR: No chest pain, no palpitations  GASTROINTESTINAL: No pain. No nausea or vomiting.  No diarrhea   NEUROLOGICAL: No headache or numbness, no tremors  MUSCULOSKELETAL: no muscle pain  GENITOURINARY: No dysuria, no frequency, no hesitancy  PSYCHIATRY: No depression , no anxiety  ALL OTHER  ROS negative     O:  Vital Signs Last 24 Hrs  T(C): 36.3 (28 Oct 2023 05:00), Max: 37.3 (27 Oct 2023 20:39)  T(F): 97.3 (28 Oct 2023 05:00), Max: 99.1 (27 Oct 2023 20:39)  HR: 63 (28 Oct 2023 05:00) (63 - 73)  BP: 134/61 (28 Oct 2023 05:00) (134/61 - 162/54)  BP(mean): --  RR: 18 (28 Oct 2023 05:00) (18 - 19)  SpO2: 98% (28 Oct 2023 05:00) (98% - 98%)    Parameters below as of 28 Oct 2023 05:00  Patient On (Oxygen Delivery Method): room air        Gen: NAD  Neuro: alert, answering qs appropriately, moves all extremities  HEENT: anicteric, moist oral mucosa  Neck: supple  Cards: no murmurs  Pulm: good inspiratory effort, breathing comfortably  Abd: soft, NT/ND, BS+  Ext: no edema  Skin: warm, dry  : guerra draining clear yellow urine      amLODIPine   Tablet 5 milliGRAM(s) Oral daily  bacitracin   Ointment 1 Application(s) Topical two times a day  chlorhexidine 2% Cloths 1 Application(s) Topical <User Schedule>  dextrose 50% Injectable 12.5 Gram(s) IV Push once  dextrose 50% Injectable 25 Gram(s) IV Push once  dextrose 50% Injectable 25 Gram(s) IV Push once  dextrose Oral Gel 15 Gram(s) Oral once PRN  finasteride 5 milliGRAM(s) Oral daily  glucagon  Injectable 1 milliGRAM(s) IntraMuscular once  heparin   Injectable 5000 Unit(s) SubCutaneous every 8 hours  insulin lispro (ADMELOG) corrective regimen sliding scale   SubCutaneous three times a day before meals  insulin lispro (ADMELOG) corrective regimen sliding scale   SubCutaneous at bedtime  lactated ringers. 1000 milliLiter(s) IV Continuous <Continuous>  pantoprazole    Tablet 40 milliGRAM(s) Oral before breakfast  piperacillin/tazobactam IVPB.. 3.375 Gram(s) IV Intermittent every 12 hours  sodium chloride 0.9% Bolus. 100 milliLiter(s) IV Bolus every 5 minutes PRN  sodium chloride 0.9% lock flush 10 milliLiter(s) IV Push every 1 hour PRN  tamsulosin 0.4 milliGRAM(s) Oral at bedtime                            8.9    14.08 )-----------( 165      ( 28 Oct 2023 05:15 )             27.5       10-28    140  |  108  |  26<H>  ----------------------------<  221<H>  4.0   |  26  |  1.52<H>    Ca    8.2<L>      28 Oct 2023 05:15    TPro  5.2<L>  /  Alb  2.3<L>  /  TBili  0.5  /  DBili  x   /  AST  19  /  ALT  32  /  AlkPhos  72  10-27

## 2023-10-29 LAB
GLUCOSE BLDC GLUCOMTR-MCNC: 221 MG/DL — HIGH (ref 70–99)
GLUCOSE BLDC GLUCOMTR-MCNC: 221 MG/DL — HIGH (ref 70–99)
GLUCOSE BLDC GLUCOMTR-MCNC: 278 MG/DL — HIGH (ref 70–99)
GLUCOSE BLDC GLUCOMTR-MCNC: 278 MG/DL — HIGH (ref 70–99)
GLUCOSE BLDC GLUCOMTR-MCNC: 316 MG/DL — HIGH (ref 70–99)
GLUCOSE BLDC GLUCOMTR-MCNC: 316 MG/DL — HIGH (ref 70–99)
GLUCOSE BLDC GLUCOMTR-MCNC: 319 MG/DL — HIGH (ref 70–99)
GLUCOSE BLDC GLUCOMTR-MCNC: 319 MG/DL — HIGH (ref 70–99)
GLUCOSE BLDC GLUCOMTR-MCNC: 337 MG/DL — HIGH (ref 70–99)
GLUCOSE BLDC GLUCOMTR-MCNC: 337 MG/DL — HIGH (ref 70–99)

## 2023-10-29 PROCEDURE — 99232 SBSQ HOSP IP/OBS MODERATE 35: CPT

## 2023-10-29 RX ORDER — INSULIN LISPRO 100/ML
VIAL (ML) SUBCUTANEOUS AT BEDTIME
Refills: 0 | Status: DISCONTINUED | OUTPATIENT
Start: 2023-10-29 | End: 2023-10-31

## 2023-10-29 RX ORDER — AMLODIPINE BESYLATE 2.5 MG/1
5 TABLET ORAL DAILY
Refills: 0 | Status: DISCONTINUED | OUTPATIENT
Start: 2023-10-29 | End: 2023-10-31

## 2023-10-29 RX ORDER — TAMSULOSIN HYDROCHLORIDE 0.4 MG/1
0.4 CAPSULE ORAL ONCE
Refills: 0 | Status: DISCONTINUED | OUTPATIENT
Start: 2023-10-29 | End: 2023-10-29

## 2023-10-29 RX ORDER — INSULIN GLARGINE 100 [IU]/ML
5 INJECTION, SOLUTION SUBCUTANEOUS AT BEDTIME
Refills: 0 | Status: DISCONTINUED | OUTPATIENT
Start: 2023-10-29 | End: 2023-10-31

## 2023-10-29 RX ORDER — TAMSULOSIN HYDROCHLORIDE 0.4 MG/1
0.8 CAPSULE ORAL AT BEDTIME
Refills: 0 | Status: DISCONTINUED | OUTPATIENT
Start: 2023-10-29 | End: 2023-10-31

## 2023-10-29 RX ORDER — INSULIN LISPRO 100/ML
VIAL (ML) SUBCUTANEOUS
Refills: 0 | Status: DISCONTINUED | OUTPATIENT
Start: 2023-10-29 | End: 2023-10-31

## 2023-10-29 RX ADMIN — AMLODIPINE BESYLATE 5 MILLIGRAM(S): 2.5 TABLET ORAL at 06:44

## 2023-10-29 RX ADMIN — Medication 4: at 22:09

## 2023-10-29 RX ADMIN — HEPARIN SODIUM 5000 UNIT(S): 5000 INJECTION INTRAVENOUS; SUBCUTANEOUS at 06:43

## 2023-10-29 RX ADMIN — HEPARIN SODIUM 5000 UNIT(S): 5000 INJECTION INTRAVENOUS; SUBCUTANEOUS at 22:09

## 2023-10-29 RX ADMIN — PANTOPRAZOLE SODIUM 40 MILLIGRAM(S): 20 TABLET, DELAYED RELEASE ORAL at 06:44

## 2023-10-29 RX ADMIN — Medication 1 APPLICATION(S): at 06:44

## 2023-10-29 RX ADMIN — CHLORHEXIDINE GLUCONATE 1 APPLICATION(S): 213 SOLUTION TOPICAL at 06:47

## 2023-10-29 RX ADMIN — PIPERACILLIN AND TAZOBACTAM 25 GRAM(S): 4; .5 INJECTION, POWDER, LYOPHILIZED, FOR SOLUTION INTRAVENOUS at 00:22

## 2023-10-29 RX ADMIN — Medication 8: at 17:35

## 2023-10-29 RX ADMIN — TAMSULOSIN HYDROCHLORIDE 0.8 MILLIGRAM(S): 0.4 CAPSULE ORAL at 22:11

## 2023-10-29 RX ADMIN — FINASTERIDE 5 MILLIGRAM(S): 5 TABLET, FILM COATED ORAL at 12:45

## 2023-10-29 RX ADMIN — Medication 2: at 08:26

## 2023-10-29 RX ADMIN — HEPARIN SODIUM 5000 UNIT(S): 5000 INJECTION INTRAVENOUS; SUBCUTANEOUS at 14:21

## 2023-10-29 RX ADMIN — INSULIN GLARGINE 5 UNIT(S): 100 INJECTION, SOLUTION SUBCUTANEOUS at 22:10

## 2023-10-29 RX ADMIN — Medication 1 APPLICATION(S): at 17:42

## 2023-10-29 RX ADMIN — Medication 4: at 12:50

## 2023-10-29 NOTE — PROGRESS NOTE ADULT - ASSESSMENT
78M, from home, ambulates with cane, [facility] BPH presents after being found down. Daughter found patient on floor at home, noted with slurred speech and AMS. Of note, was taking cirpo for UTI. Pt reports that previous to this episode, he was suffering from urinary retention for several days and straining to urinate. On presentation, patient was slightly hypotensive, tachypneic, with kidney failure, elevated lactate, and found w large bowl obstruction from inguinal hernia. Admission to ICU was for shock, suspected sepsis 2/2 UTI. CCB AF RVR. Started on zosyn and briefly on pressors. Underwent emergent HD for renal failure. Encephalopathy improved, hemodynamically stable, and hernia was manually reduced, NGT removed, and started on diet. Pt downgraded to medicine.     AP:  TME, resolved  Shock, possibly sepsis, resolved  pyuria  Acute kidney failure, ATN, suspect 2/2 shock  urinary retention, BPH  hematuria, suspect 2/2 AC and trauma  lactic acidosis from shock, kidney failure, metformin use  hypernatremia, from polyuric phase of ATN  large bowel obstruction, inguinal hernia, manually reduced  symptomatic AF with RVR, in response to shock, resolved  HTN  DM2  hyperglycemia    -TME and shock resolved  -s/p 7d empiric zosyn, UCx and BCx on admission with no growth  -electrolytes abnormalities improved, AKIN improved and stable  -failed TOV, will retry today  -urine no longer bloody, If fails TOV, will have pt follow up with Urology  -hernia reduced, advanced diet  -c/w home flomax and finasteride  -PT eval: rec AMAIRANI now  -dvt ppx    Ordered labs: JUAN PABLO  Reviewed labs: JUAN PABLO    dispo: pending AMAIRANI, hold home metformin on DC, will need to discuss with outpt provider about meds

## 2023-10-29 NOTE — PROGRESS NOTE ADULT - ASSESSMENT
AKIN:  Likely ATN from sepsis. No evidence of obstruction.  S/P HD on 10/22.  He has recovered renal function.  - Monitor BMP.  - Avoid nephrotoxins.    Metabolic Alkalosis:  Initially, he had High AG Metabolic Acidosis, likely Lactic Acidosis form septic shock.   This has resolved and he is now Alkalotic. Possible due to volume contraction or wasting of Anions in the urine.  - Follow up BMP.    Hypernatremia:  Due to volume contraction. He has post AKIN polyuria, likely hypoosmotic.  Improved now.  - Send urine OSM.  - Hypotonic fluids.        Hypokalemia:  - Supplement as needed.    Incarcerated Hernia:  - Surgery follow up

## 2023-10-29 NOTE — CHART NOTE - NSCHARTNOTEFT_GEN_A_CORE
EVENT: Bladder scan now 560 ml, 2nd episode of urinary retention post Rangel removal    BRIEF HPI: 79 y/o M, lives alone at home, ambulates w/ a cane. He has PMH of HTN, DM, BPH. Admitted for acute renal failure, s/p dialysis x1, nephro following.     OBJECTIVE:  Vital Signs Last 24 Hrs  T(C): 37.1 (28 Oct 2023 20:42), Max: 37.1 (28 Oct 2023 20:42)  T(F): 98.7 (28 Oct 2023 20:42), Max: 98.7 (28 Oct 2023 20:42)  HR: 66 (28 Oct 2023 20:42) (63 - 78)  BP: 140/55 (28 Oct 2023 20:42) (134/61 - 140/55)  BP(mean): --  RR: 18 (28 Oct 2023 20:42) (18 - 18)  SpO2: 98% (28 Oct 2023 20:42) (95% - 98%)    Parameters below as of 28 Oct 2023 20:42  Patient On (Oxygen Delivery Method): room air    FOCUSED PHYSICAL EXAM:  : Abd distension, urge to void  absent  NEURO: Alert, oriented X 3  CV: S1 S2, regular    LABS:                        8.9    14.08 )-----------( 165      ( 28 Oct 2023 05:15 )             27.5     10-28    140  |  108  |  26<H>  ----------------------------<  221<H>  4.0   |  26  |  1.52<H>    Ca    8.2<L>      28 Oct 2023 05:15    TPro  5.2<L>  /  Alb  2.3<L>  /  TBili  0.5  /  DBili  x   /  AST  19  /  ALT  32  /  AlkPhos  72  10-27    PROBLEM: Urinary retention probably due to BPH  PLAN:   Rangel now  Increase tamsulosin to 0.8 iyv GRAM(s) Oral at bedtime    FOLLOW UP / RESULT: urinary output

## 2023-10-29 NOTE — PROGRESS NOTE ADULT - SUBJECTIVE AND OBJECTIVE BOX
Interval of present illness: No acute events overnight. Pt seen at bedside. No new complaints.    REVIEW OF SYSTEMS:    CONSTITUTIONAL: No fever  EYES: No acute visual disturbances  NECK: No pain or stiffness  RESPIRATORY: No cough; No shortness of breath  CARDIOVASCULAR: No chest pain, no palpitations  GASTROINTESTINAL: No pain. No nausea or vomiting.  No diarrhea   NEUROLOGICAL: No headache or numbness, no tremors  MUSCULOSKELETAL: no muscle pain  GENITOURINARY: No dysuria, no frequency, no hesitancy  PSYCHIATRY: No depression , no anxiety  ALL OTHER  ROS negative     O:  Vital Signs Last 24 Hrs  T(C): 36.8 (29 Oct 2023 04:59), Max: 37.1 (28 Oct 2023 20:42)  T(F): 98.3 (29 Oct 2023 04:59), Max: 98.7 (28 Oct 2023 20:42)  HR: 65 (29 Oct 2023 04:59) (65 - 78)  BP: 155/58 (29 Oct 2023 04:59) (139/56 - 155/58)  BP(mean): --  RR: 18 (29 Oct 2023 04:59) (18 - 18)  SpO2: 97% (29 Oct 2023 04:59) (95% - 98%)    Parameters below as of 29 Oct 2023 04:59  Patient On (Oxygen Delivery Method): room air        Gen: NAD  Neuro: alert, answering qs appropriately, moves all extremities  HEENT: anicteric, moist oral mucosa  Neck: supple  Cards: no murmurs appreicated  Pulm: good inspiratory effort, breathing comfortably  Abd: soft, NT/ND, BS+  Ext: no edema  Skin: warm, dry  : guerra with yellow clear urine      amLODIPine   Tablet 5 milliGRAM(s) Oral daily  bacitracin   Ointment 1 Application(s) Topical two times a day  chlorhexidine 2% Cloths 1 Application(s) Topical <User Schedule>  dextrose 50% Injectable 25 Gram(s) IV Push once  dextrose 50% Injectable 12.5 Gram(s) IV Push once  dextrose 50% Injectable 25 Gram(s) IV Push once  dextrose Oral Gel 15 Gram(s) Oral once PRN  finasteride 5 milliGRAM(s) Oral daily  glucagon  Injectable 1 milliGRAM(s) IntraMuscular once  heparin   Injectable 5000 Unit(s) SubCutaneous every 8 hours  insulin lispro (ADMELOG) corrective regimen sliding scale   SubCutaneous three times a day before meals  insulin lispro (ADMELOG) corrective regimen sliding scale   SubCutaneous at bedtime  lactated ringers. 1000 milliLiter(s) IV Continuous <Continuous>  pantoprazole    Tablet 40 milliGRAM(s) Oral before breakfast  sodium chloride 0.9% Bolus. 100 milliLiter(s) IV Bolus every 5 minutes PRN  sodium chloride 0.9% lock flush 10 milliLiter(s) IV Push every 1 hour PRN  tamsulosin 0.8 milliGRAM(s) Oral at bedtime                            8.9    14.08 )-----------( 165      ( 28 Oct 2023 05:15 )             27.5       10-28    140  |  108  |  26<H>  ----------------------------<  221<H>  4.0   |  26  |  1.52<H>    Ca    8.2<L>      28 Oct 2023 05:15

## 2023-10-29 NOTE — PROGRESS NOTE ADULT - SUBJECTIVE AND OBJECTIVE BOX
ANITA KOLB  78y  Patient is a 78y old  Male who presents with a chief complaint of acute renal failure (29 Oct 2023 12:54)    Followed for AKIN on CKD.    HEALTH ISSUES - PROBLEM Dx:  Metabolic encephalopathy    H/O ventral hernia repair    Recurrent ventral hernia    Hypernatremia    HTN (hypertension)    DM (diabetes mellitus)    Afib    Prophylactic measure    Discharge planning issues          MEDICATIONS  (STANDING):  amLODIPine   Tablet 5 milliGRAM(s) Oral daily  bacitracin   Ointment 1 Application(s) Topical two times a day  chlorhexidine 2% Cloths 1 Application(s) Topical <User Schedule>  dextrose 50% Injectable 25 Gram(s) IV Push once  dextrose 50% Injectable 12.5 Gram(s) IV Push once  dextrose 50% Injectable 25 Gram(s) IV Push once  finasteride 5 milliGRAM(s) Oral daily  glucagon  Injectable 1 milliGRAM(s) IntraMuscular once  heparin   Injectable 5000 Unit(s) SubCutaneous every 8 hours  insulin lispro (ADMELOG) corrective regimen sliding scale   SubCutaneous three times a day before meals  insulin lispro (ADMELOG) corrective regimen sliding scale   SubCutaneous at bedtime  lactated ringers. 1000 milliLiter(s) (100 mL/Hr) IV Continuous <Continuous>  pantoprazole    Tablet 40 milliGRAM(s) Oral before breakfast  tamsulosin 0.8 milliGRAM(s) Oral at bedtime    MEDICATIONS  (PRN):  dextrose Oral Gel 15 Gram(s) Oral once PRN Blood Glucose LESS THAN 70 milliGRAM(s)/deciliter  sodium chloride 0.9% Bolus. 100 milliLiter(s) IV Bolus every 5 minutes PRN SBP LESS THAN or EQUAL to 90 mmHg  sodium chloride 0.9% lock flush 10 milliLiter(s) IV Push every 1 hour PRN Pre/post blood products, medications, blood draw, and to maintain line patency    Vital Signs Last 24 Hrs  T(C): 37.2 (29 Oct 2023 14:00), Max: 37.2 (29 Oct 2023 14:00)  T(F): 98.9 (29 Oct 2023 14:00), Max: 98.9 (29 Oct 2023 14:00)  HR: 80 (29 Oct 2023 14:00) (65 - 80)  BP: 131/38 (29 Oct 2023 14:00) (131/38 - 155/58)  BP(mean): --  RR: 18 (29 Oct 2023 14:00) (18 - 18)  SpO2: 99% (29 Oct 2023 14:00) (97% - 99%)    Parameters below as of 29 Oct 2023 14:00  Patient On (Oxygen Delivery Method): room air      Daily     Daily     PHYSICAL EXAM:  Constitutional: He appears comfortable and not distressed. Not diaphoretic.    Neck:  The thyroid is normal. Trachea is midline.     Respiratory: The lungs are clear to auscultation. No dullness and expansion is normal.    Cardiovascular: S1 and S2 are normal. No mummurs, rubs or gallops are present.    Gastrointestinal: The abdomen is soft. No tenderness is present. No masses are present. Bowel sounds are normal.    Genitourinary: The bladder is not distended. No CVA tenderness is present.    Extremities: No edema is noted. No deformities are present.    Neurological: Cognition is normal. Tone, power and sensation are normal. Gait is steady.    Skin: No leasions are seen  or palpated.    Lymph Nodes: No lymphadenopathy is present.    Psychiatric: Mood is appropriate. No hallucinations or flight of ideas are noted.                              8.9    14.08 )-----------( 165      ( 28 Oct 2023 05:15 )             27.5     10-28    140  |  108  |  26<H>  ----------------------------<  221<H>  4.0   |  26  |  1.52<H>    Ca    8.2<L>      28 Oct 2023 05:15      Urinalysis Basic - ( 28 Oct 2023 05:15 )    Color: x / Appearance: x / SG: x / pH: x  Gluc: 221 mg/dL / Ketone: x  / Bili: x / Urobili: x   Blood: x / Protein: x / Nitrite: x   Leuk Esterase: x / RBC: x / WBC x   Sq Epi: x / Non Sq Epi: x / Bacteria: x

## 2023-10-30 LAB
ANION GAP SERPL CALC-SCNC: 8 MMOL/L — SIGNIFICANT CHANGE UP (ref 5–17)
ANION GAP SERPL CALC-SCNC: 8 MMOL/L — SIGNIFICANT CHANGE UP (ref 5–17)
APPEARANCE UR: ABNORMAL
APPEARANCE UR: ABNORMAL
BACTERIA # UR AUTO: ABNORMAL /HPF
BACTERIA # UR AUTO: ABNORMAL /HPF
BILIRUB UR-MCNC: NEGATIVE — SIGNIFICANT CHANGE UP
BILIRUB UR-MCNC: NEGATIVE — SIGNIFICANT CHANGE UP
BUN SERPL-MCNC: 28 MG/DL — HIGH (ref 7–18)
BUN SERPL-MCNC: 28 MG/DL — HIGH (ref 7–18)
CALCIUM SERPL-MCNC: 7.3 MG/DL — LOW (ref 8.4–10.5)
CALCIUM SERPL-MCNC: 7.3 MG/DL — LOW (ref 8.4–10.5)
CHLORIDE SERPL-SCNC: 112 MMOL/L — HIGH (ref 96–108)
CHLORIDE SERPL-SCNC: 112 MMOL/L — HIGH (ref 96–108)
CO2 SERPL-SCNC: 22 MMOL/L — SIGNIFICANT CHANGE UP (ref 22–31)
CO2 SERPL-SCNC: 22 MMOL/L — SIGNIFICANT CHANGE UP (ref 22–31)
COLOR SPEC: YELLOW — SIGNIFICANT CHANGE UP
COLOR SPEC: YELLOW — SIGNIFICANT CHANGE UP
CREAT ?TM UR-MCNC: 102 MG/DL — SIGNIFICANT CHANGE UP
CREAT ?TM UR-MCNC: 102 MG/DL — SIGNIFICANT CHANGE UP
CREAT SERPL-MCNC: 1.38 MG/DL — HIGH (ref 0.5–1.3)
CREAT SERPL-MCNC: 1.38 MG/DL — HIGH (ref 0.5–1.3)
DIFF PNL FLD: ABNORMAL
DIFF PNL FLD: ABNORMAL
EGFR: 53 ML/MIN/1.73M2 — LOW
EGFR: 53 ML/MIN/1.73M2 — LOW
EPI CELLS # UR: PRESENT
EPI CELLS # UR: PRESENT
GLUCOSE BLDC GLUCOMTR-MCNC: 211 MG/DL — HIGH (ref 70–99)
GLUCOSE BLDC GLUCOMTR-MCNC: 211 MG/DL — HIGH (ref 70–99)
GLUCOSE BLDC GLUCOMTR-MCNC: 213 MG/DL — HIGH (ref 70–99)
GLUCOSE BLDC GLUCOMTR-MCNC: 213 MG/DL — HIGH (ref 70–99)
GLUCOSE BLDC GLUCOMTR-MCNC: 246 MG/DL — HIGH (ref 70–99)
GLUCOSE BLDC GLUCOMTR-MCNC: 246 MG/DL — HIGH (ref 70–99)
GLUCOSE BLDC GLUCOMTR-MCNC: 257 MG/DL — HIGH (ref 70–99)
GLUCOSE BLDC GLUCOMTR-MCNC: 257 MG/DL — HIGH (ref 70–99)
GLUCOSE SERPL-MCNC: 206 MG/DL — HIGH (ref 70–99)
GLUCOSE SERPL-MCNC: 206 MG/DL — HIGH (ref 70–99)
GLUCOSE UR QL: 100 MG/DL
GLUCOSE UR QL: 100 MG/DL
HCT VFR BLD CALC: 26 % — LOW (ref 39–50)
HCT VFR BLD CALC: 26 % — LOW (ref 39–50)
HGB BLD-MCNC: 8.5 G/DL — LOW (ref 13–17)
HGB BLD-MCNC: 8.5 G/DL — LOW (ref 13–17)
KETONES UR-MCNC: NEGATIVE MG/DL — SIGNIFICANT CHANGE UP
KETONES UR-MCNC: NEGATIVE MG/DL — SIGNIFICANT CHANGE UP
LEUKOCYTE ESTERASE UR-ACNC: ABNORMAL
LEUKOCYTE ESTERASE UR-ACNC: ABNORMAL
MCHC RBC-ENTMCNC: 28.4 PG — SIGNIFICANT CHANGE UP (ref 27–34)
MCHC RBC-ENTMCNC: 28.4 PG — SIGNIFICANT CHANGE UP (ref 27–34)
MCHC RBC-ENTMCNC: 32.7 GM/DL — SIGNIFICANT CHANGE UP (ref 32–36)
MCHC RBC-ENTMCNC: 32.7 GM/DL — SIGNIFICANT CHANGE UP (ref 32–36)
MCV RBC AUTO: 87 FL — SIGNIFICANT CHANGE UP (ref 80–100)
MCV RBC AUTO: 87 FL — SIGNIFICANT CHANGE UP (ref 80–100)
NITRITE UR-MCNC: NEGATIVE — SIGNIFICANT CHANGE UP
NITRITE UR-MCNC: NEGATIVE — SIGNIFICANT CHANGE UP
NRBC # BLD: 0 /100 WBCS — SIGNIFICANT CHANGE UP (ref 0–0)
NRBC # BLD: 0 /100 WBCS — SIGNIFICANT CHANGE UP (ref 0–0)
PH UR: 5 — SIGNIFICANT CHANGE UP (ref 5–8)
PH UR: 5 — SIGNIFICANT CHANGE UP (ref 5–8)
PLATELET # BLD AUTO: 197 K/UL — SIGNIFICANT CHANGE UP (ref 150–400)
PLATELET # BLD AUTO: 197 K/UL — SIGNIFICANT CHANGE UP (ref 150–400)
POTASSIUM SERPL-MCNC: 4 MMOL/L — SIGNIFICANT CHANGE UP (ref 3.5–5.3)
POTASSIUM SERPL-MCNC: 4 MMOL/L — SIGNIFICANT CHANGE UP (ref 3.5–5.3)
POTASSIUM SERPL-SCNC: 4 MMOL/L — SIGNIFICANT CHANGE UP (ref 3.5–5.3)
POTASSIUM SERPL-SCNC: 4 MMOL/L — SIGNIFICANT CHANGE UP (ref 3.5–5.3)
PROT ?TM UR-MCNC: 78 MG/DL — HIGH (ref 0–12)
PROT ?TM UR-MCNC: 78 MG/DL — HIGH (ref 0–12)
PROT UR-MCNC: 100 MG/DL
PROT UR-MCNC: 100 MG/DL
RBC # BLD: 2.99 M/UL — LOW (ref 4.2–5.8)
RBC # BLD: 2.99 M/UL — LOW (ref 4.2–5.8)
RBC # FLD: 12.8 % — SIGNIFICANT CHANGE UP (ref 10.3–14.5)
RBC # FLD: 12.8 % — SIGNIFICANT CHANGE UP (ref 10.3–14.5)
RBC CASTS # UR COMP ASSIST: 10 /HPF — HIGH (ref 0–4)
RBC CASTS # UR COMP ASSIST: 10 /HPF — HIGH (ref 0–4)
SODIUM SERPL-SCNC: 142 MMOL/L — SIGNIFICANT CHANGE UP (ref 135–145)
SODIUM SERPL-SCNC: 142 MMOL/L — SIGNIFICANT CHANGE UP (ref 135–145)
SP GR SPEC: 1.01 — SIGNIFICANT CHANGE UP (ref 1–1.03)
SP GR SPEC: 1.01 — SIGNIFICANT CHANGE UP (ref 1–1.03)
UROBILINOGEN FLD QL: 0.2 MG/DL — SIGNIFICANT CHANGE UP (ref 0.2–1)
UROBILINOGEN FLD QL: 0.2 MG/DL — SIGNIFICANT CHANGE UP (ref 0.2–1)
WBC # BLD: 13.19 K/UL — HIGH (ref 3.8–10.5)
WBC # BLD: 13.19 K/UL — HIGH (ref 3.8–10.5)
WBC # FLD AUTO: 13.19 K/UL — HIGH (ref 3.8–10.5)
WBC # FLD AUTO: 13.19 K/UL — HIGH (ref 3.8–10.5)
WBC UR QL: 7 /HPF — HIGH (ref 0–5)
WBC UR QL: 7 /HPF — HIGH (ref 0–5)

## 2023-10-30 PROCEDURE — 99239 HOSP IP/OBS DSCHRG MGMT >30: CPT

## 2023-10-30 RX ORDER — BENAZEPRIL HYDROCHLORIDE 40 MG/1
1 TABLET ORAL
Refills: 0 | DISCHARGE

## 2023-10-30 RX ORDER — AMLODIPINE BESYLATE 2.5 MG/1
1 TABLET ORAL
Qty: 0 | Refills: 0 | DISCHARGE
Start: 2023-10-30

## 2023-10-30 RX ORDER — METFORMIN HYDROCHLORIDE 850 MG/1
1 TABLET ORAL
Refills: 0 | DISCHARGE

## 2023-10-30 RX ORDER — AMLODIPINE BESYLATE 2.5 MG/1
1 TABLET ORAL
Refills: 0 | DISCHARGE

## 2023-10-30 RX ADMIN — Medication 4: at 08:19

## 2023-10-30 RX ADMIN — FINASTERIDE 5 MILLIGRAM(S): 5 TABLET, FILM COATED ORAL at 12:04

## 2023-10-30 RX ADMIN — Medication 4: at 12:05

## 2023-10-30 RX ADMIN — Medication 1 APPLICATION(S): at 17:49

## 2023-10-30 RX ADMIN — HEPARIN SODIUM 5000 UNIT(S): 5000 INJECTION INTRAVENOUS; SUBCUTANEOUS at 05:29

## 2023-10-30 RX ADMIN — CHLORHEXIDINE GLUCONATE 1 APPLICATION(S): 213 SOLUTION TOPICAL at 05:34

## 2023-10-30 RX ADMIN — Medication 1 APPLICATION(S): at 05:36

## 2023-10-30 RX ADMIN — HEPARIN SODIUM 5000 UNIT(S): 5000 INJECTION INTRAVENOUS; SUBCUTANEOUS at 17:44

## 2023-10-30 RX ADMIN — Medication 2: at 21:49

## 2023-10-30 RX ADMIN — AMLODIPINE BESYLATE 5 MILLIGRAM(S): 2.5 TABLET ORAL at 05:26

## 2023-10-30 RX ADMIN — Medication 4: at 17:44

## 2023-10-30 RX ADMIN — PANTOPRAZOLE SODIUM 40 MILLIGRAM(S): 20 TABLET, DELAYED RELEASE ORAL at 12:04

## 2023-10-30 RX ADMIN — INSULIN GLARGINE 5 UNIT(S): 100 INJECTION, SOLUTION SUBCUTANEOUS at 21:44

## 2023-10-30 RX ADMIN — TAMSULOSIN HYDROCHLORIDE 0.8 MILLIGRAM(S): 0.4 CAPSULE ORAL at 21:43

## 2023-10-30 RX ADMIN — HEPARIN SODIUM 5000 UNIT(S): 5000 INJECTION INTRAVENOUS; SUBCUTANEOUS at 21:48

## 2023-10-30 NOTE — PROGRESS NOTE ADULT - SUBJECTIVE AND OBJECTIVE BOX
ANITA KOLB  78y  Patient is a 78y old  Male who presents with a chief complaint of acute renal failure (29 Oct 2023 16:05)    HPI:  Seen and examined. He is followed for AKIN. He has BPH and was admitted for shock and AKIN.  HD held now but Cr appears to be plateauing at 1.3.    HEALTH ISSUES - PROBLEM Dx:  Metabolic encephalopathy    H/O ventral hernia repair    Recurrent ventral hernia    Hypernatremia    HTN (hypertension)    DM (diabetes mellitus)    Afib    Prophylactic measure    Discharge planning issues          MEDICATIONS  (STANDING):  amLODIPine   Tablet 5 milliGRAM(s) Oral daily  bacitracin   Ointment 1 Application(s) Topical two times a day  chlorhexidine 2% Cloths 1 Application(s) Topical <User Schedule>  dextrose 50% Injectable 25 Gram(s) IV Push once  dextrose 50% Injectable 12.5 Gram(s) IV Push once  dextrose 50% Injectable 25 Gram(s) IV Push once  finasteride 5 milliGRAM(s) Oral daily  glucagon  Injectable 1 milliGRAM(s) IntraMuscular once  heparin   Injectable 5000 Unit(s) SubCutaneous every 8 hours  insulin glargine Injectable (LANTUS) 5 Unit(s) SubCutaneous at bedtime  insulin lispro (ADMELOG) corrective regimen sliding scale   SubCutaneous three times a day before meals  insulin lispro (ADMELOG) corrective regimen sliding scale   SubCutaneous at bedtime  lactated ringers. 1000 milliLiter(s) (100 mL/Hr) IV Continuous <Continuous>  pantoprazole    Tablet 40 milliGRAM(s) Oral before breakfast  tamsulosin 0.8 milliGRAM(s) Oral at bedtime    MEDICATIONS  (PRN):  dextrose Oral Gel 15 Gram(s) Oral once PRN Blood Glucose LESS THAN 70 milliGRAM(s)/deciliter  sodium chloride 0.9% Bolus. 100 milliLiter(s) IV Bolus every 5 minutes PRN SBP LESS THAN or EQUAL to 90 mmHg  sodium chloride 0.9% lock flush 10 milliLiter(s) IV Push every 1 hour PRN Pre/post blood products, medications, blood draw, and to maintain line patency    Vital Signs Last 24 Hrs  T(C): 36.7 (30 Oct 2023 05:05), Max: 37.2 (29 Oct 2023 14:00)  T(F): 98 (30 Oct 2023 05:05), Max: 98.9 (29 Oct 2023 14:00)  HR: 85 (30 Oct 2023 09:40) (69 - 85)  BP: 128/74 (30 Oct 2023 09:40) (128/74 - 153/66)  BP(mean): --  RR: 18 (30 Oct 2023 05:05) (18 - 18)  SpO2: 98% (30 Oct 2023 09:40) (95% - 99%)    Parameters below as of 30 Oct 2023 05:05  Patient On (Oxygen Delivery Method): room air      Daily     Daily     PHYSICAL EXAM:  Constitutional:   appears comfortable and not distressed. Not diaphoretic.    Neck:  The thyroid is normal. Trachea is midline.     Breasts: Normal examination.    Respiratory: The lungs are clear to auscultation. No dullness and expansion is normal.    Cardiovascular: S1 and S2 are normal. No murmurs, rubs or gallops are present.    Gastrointestinal: The abdomen is soft. No tenderness is present. No masses are present. Bowel sounds are normal.    Genitourinary: The bladder is not distended. No CVA tenderness is present.    Extremities: No edema is noted. No deformities are present.    Neurological: Cognition is normal. Tone, power and sensation are normal. Gait is steady.    Skin: No lesions are seen  or palpated.    Lymph Nodes: No lymphadenopathy is present.    Psychiatric: Mood is appropriate. No hallucinations or flight of ideas are noted.                              8.5    13.19 )-----------( 197      ( 30 Oct 2023 05:05 )             26.0     10-30    142  |  112<H>  |  28<H>  ----------------------------<  206<H>  4.0   |  22  |  1.38<H>    Ca    7.3<L>      30 Oct 2023 05:05      Urinalysis Basic - ( 30 Oct 2023 05:05 )    Color: x / Appearance: x / SG: x / pH: x  Gluc: 206 mg/dL / Ketone: x  / Bili: x / Urobili: x   Blood: x / Protein: x / Nitrite: x   Leuk Esterase: x / RBC: x / WBC x   Sq Epi: x / Non Sq Epi: x / Bacteria: x

## 2023-10-30 NOTE — PROGRESS NOTE ADULT - PROVIDER SPECIALTY LIST ADULT
Infectious Disease
Internal Medicine
Surgery
Internal Medicine
Critical Care
Critical Care
Infectious Disease
Surgery
Internal Medicine
Surgery
Nephrology
Nephrology
Internal Medicine
Nephrology
Internal Medicine

## 2023-10-30 NOTE — PROGRESS NOTE ADULT - PROBLEM SELECTOR PROBLEM 2
AKIN (acute kidney injury)
Recurrent ventral hernia
AKIN (acute kidney injury)
AKIN (acute kidney injury)

## 2023-10-30 NOTE — PROGRESS NOTE ADULT - REASON FOR ADMISSION
acute renal failure

## 2023-10-30 NOTE — PROGRESS NOTE ADULT - SUBJECTIVE AND OBJECTIVE BOX
Patient is a 78y old  Male who presents with a chief complaint of acute renal failure (30 Oct 2023 12:09)      INTERVAL HPI/OVERNIGHT EVENTS: pt seen at bedside alert. No new complaints or acute changes overnight.     MEDICATIONS  (STANDING):  amLODIPine   Tablet 5 milliGRAM(s) Oral daily  bacitracin   Ointment 1 Application(s) Topical two times a day  chlorhexidine 2% Cloths 1 Application(s) Topical <User Schedule>  dextrose 50% Injectable 25 Gram(s) IV Push once  dextrose 50% Injectable 12.5 Gram(s) IV Push once  dextrose 50% Injectable 25 Gram(s) IV Push once  finasteride 5 milliGRAM(s) Oral daily  glucagon  Injectable 1 milliGRAM(s) IntraMuscular once  heparin   Injectable 5000 Unit(s) SubCutaneous every 8 hours  insulin glargine Injectable (LANTUS) 5 Unit(s) SubCutaneous at bedtime  insulin lispro (ADMELOG) corrective regimen sliding scale   SubCutaneous three times a day before meals  insulin lispro (ADMELOG) corrective regimen sliding scale   SubCutaneous at bedtime  lactated ringers. 1000 milliLiter(s) (100 mL/Hr) IV Continuous <Continuous>  pantoprazole    Tablet 40 milliGRAM(s) Oral before breakfast  tamsulosin 0.8 milliGRAM(s) Oral at bedtime    MEDICATIONS  (PRN):  dextrose Oral Gel 15 Gram(s) Oral once PRN Blood Glucose LESS THAN 70 milliGRAM(s)/deciliter  sodium chloride 0.9% Bolus. 100 milliLiter(s) IV Bolus every 5 minutes PRN SBP LESS THAN or EQUAL to 90 mmHg  sodium chloride 0.9% lock flush 10 milliLiter(s) IV Push every 1 hour PRN Pre/post blood products, medications, blood draw, and to maintain line patency      __________________________________________________  REVIEW OF SYSTEMS:    CONSTITUTIONAL: No fever,   EYES: no acute visual disturbances  NECK: No pain or stiffness  RESPIRATORY: No cough; No shortness of breath  CARDIOVASCULAR: No chest pain, no palpitations  GASTROINTESTINAL: No pain. No nausea or vomiting; No diarrhea   NEUROLOGICAL: No headache or numbness, no tremors  MUSCULOSKELETAL: No joint pain, no muscle pain  GENITOURINARY: no dysuria, no frequency, no hesitancy  PSYCHIATRY: no depression , no anxiety  ALL OTHER  ROS negative      Vital Signs Last 24 Hrs  T(C): 36.7 (30 Oct 2023 05:05), Max: 37.2 (29 Oct 2023 14:00)  T(F): 98 (30 Oct 2023 05:05), Max: 98.9 (29 Oct 2023 14:00)  HR: 85 (30 Oct 2023 09:40) (69 - 85)  BP: 128/74 (30 Oct 2023 09:40) (128/74 - 153/66)  BP(mean): --  RR: 18 (30 Oct 2023 05:05) (18 - 18)  SpO2: 98% (30 Oct 2023 09:40) (95% - 99%)    Parameters below as of 30 Oct 2023 05:05  Patient On (Oxygen Delivery Method): room air        ________________________________________________  PHYSICAL EXAM:  GENERAL: NAD  HEENT: Normocephalic;  conjunctivae and sclerae clear; moist mucous membranes;   NECK : supple  CHEST/LUNG: Clear to auscultation bilaterally with good air entry   HEART: S1 S2  regular; no murmurs, gallops or rubs  ABDOMEN: Soft, Nontender, Nondistended; Bowel sounds present  EXTREMITIES: no cyanosis; no edema; no calf tenderness  SKIN: warm and dry; no rash  NERVOUS SYSTEM:  Awake and alert; Oriented  to place, person and time ; no new deficits    _________________________________________________  LABS:                        8.5    13.19 )-----------( 197      ( 30 Oct 2023 05:05 )             26.0     10-30    142  |  112<H>  |  28<H>  ----------------------------<  206<H>  4.0   |  22  |  1.38<H>    Ca    7.3<L>      30 Oct 2023 05:05        Urinalysis Basic - ( 30 Oct 2023 05:05 )    Color: x / Appearance: x / SG: x / pH: x  Gluc: 206 mg/dL / Ketone: x  / Bili: x / Urobili: x   Blood: x / Protein: x / Nitrite: x   Leuk Esterase: x / RBC: x / WBC x   Sq Epi: x / Non Sq Epi: x / Bacteria: x      CAPILLARY BLOOD GLUCOSE      POCT Blood Glucose.: 246 mg/dL (30 Oct 2023 11:21)  POCT Blood Glucose.: 213 mg/dL (30 Oct 2023 07:52)  POCT Blood Glucose.: 316 mg/dL (29 Oct 2023 21:44)  POCT Blood Glucose.: 337 mg/dL (29 Oct 2023 17:03)      RADIOLOGY & ADDITIONAL TESTS:    Imaging Personally Reviewed:  YES/NO    Consultant(s) Notes Reviewed:   YES/ No    Care Discussed with Consultants :     Plan of care was discussed with patient and /or primary care giver; all questions and concerns were addressed and care was aligned with patient's wishes.

## 2023-10-30 NOTE — PROGRESS NOTE ADULT - ASSESSMENT
AKIN:  Likely ATN from sepsis. No evidence of obstruction.  S/P HD on 10/22.  He has recovered renal function.  However, Cr is leveling at 1.2-1.3. He may likely have some CKD 3 from prior obstruction as he has BPH and is at risk.  - Monitor BMP.  - Avoid nephrotoxins.    Metabolic Alkalosis:  Initially, he had High AG Metabolic Acidosis, likely Lactic Acidosis form septic shock.   This has resolved and he is now Alkalotic. Possible due to volume contraction or wasting of Anions in the urine.  - Follow up BMP.    Hypernatremia:  Due to volume contraction. He has post AKIN polyuria, likely hypoosmotic.  Improved now.  - Send urine OSM.  - Hypotonic fluids.        Hypokalemia:  - Supplement as needed.    Incarcerated Hernia:  - Surgery follow up

## 2023-10-30 NOTE — PROGRESS NOTE ADULT - SUBJECTIVE AND OBJECTIVE BOX
Patient is a 78y old  Male who presents with a chief complaint of acute renal failure (30 Oct 2023 11:19)      INTERVAL HPI/OVERNIGHT EVENTS: no new complaints    MEDICATIONS  (STANDING):  amLODIPine   Tablet 5 milliGRAM(s) Oral daily  bacitracin   Ointment 1 Application(s) Topical two times a day  chlorhexidine 2% Cloths 1 Application(s) Topical <User Schedule>  dextrose 50% Injectable 12.5 Gram(s) IV Push once  dextrose 50% Injectable 25 Gram(s) IV Push once  dextrose 50% Injectable 25 Gram(s) IV Push once  finasteride 5 milliGRAM(s) Oral daily  glucagon  Injectable 1 milliGRAM(s) IntraMuscular once  heparin   Injectable 5000 Unit(s) SubCutaneous every 8 hours  insulin glargine Injectable (LANTUS) 5 Unit(s) SubCutaneous at bedtime  insulin lispro (ADMELOG) corrective regimen sliding scale   SubCutaneous three times a day before meals  insulin lispro (ADMELOG) corrective regimen sliding scale   SubCutaneous at bedtime  lactated ringers. 1000 milliLiter(s) (100 mL/Hr) IV Continuous <Continuous>  pantoprazole    Tablet 40 milliGRAM(s) Oral before breakfast  tamsulosin 0.8 milliGRAM(s) Oral at bedtime    MEDICATIONS  (PRN):  dextrose Oral Gel 15 Gram(s) Oral once PRN Blood Glucose LESS THAN 70 milliGRAM(s)/deciliter  sodium chloride 0.9% Bolus. 100 milliLiter(s) IV Bolus every 5 minutes PRN SBP LESS THAN or EQUAL to 90 mmHg  sodium chloride 0.9% lock flush 10 milliLiter(s) IV Push every 1 hour PRN Pre/post blood products, medications, blood draw, and to maintain line patency      __________________________________________________  REVIEW OF SYSTEMS:    CONSTITUTIONAL: No fever,   EYES: no acute visual disturbances  NECK: No pain or stiffness  RESPIRATORY: No cough; No shortness of breath  CARDIOVASCULAR: No chest pain, no palpitations  GASTROINTESTINAL: No pain. No nausea or vomiting; No diarrhea   NEUROLOGICAL: No headache or numbness, no tremors  MUSCULOSKELETAL: No joint pain, no muscle pain  GENITOURINARY: no dysuria, no frequency, no hesitancy  PSYCHIATRY: no depression , no anxiety  ALL OTHER  ROS negative      Vital Signs Last 24 Hrs  T(C): 36.7 (30 Oct 2023 05:05), Max: 37.2 (29 Oct 2023 14:00)  T(F): 98 (30 Oct 2023 05:05), Max: 98.9 (29 Oct 2023 14:00)  HR: 85 (30 Oct 2023 09:40) (69 - 85)  BP: 128/74 (30 Oct 2023 09:40) (128/74 - 153/66)  BP(mean): --  RR: 18 (30 Oct 2023 05:05) (18 - 18)  SpO2: 98% (30 Oct 2023 09:40) (95% - 99%)    Parameters below as of 30 Oct 2023 05:05  Patient On (Oxygen Delivery Method): room air        ________________________________________________  PHYSICAL EXAM:  GENERAL: NAD  HEENT: Normocephalic;  conjunctivae and sclerae clear; moist mucous membranes;   NECK : supple  CHEST/LUNG: Clear to auscultation bilaterally with good air entry   HEART: S1 S2  regular; no murmurs, gallops or rubs  ABDOMEN: Soft, Nontender, Nondistended; Bowel sounds present  EXTREMITIES: no cyanosis; no edema; no calf tenderness  SKIN: warm and dry; no rash  NERVOUS SYSTEM:  Awake and alert; Oriented  to place, person and time ; no new deficits    _________________________________________________  LABS:                        8.5    13.19 )-----------( 197      ( 30 Oct 2023 05:05 )             26.0     10-30    142  |  112<H>  |  28<H>  ----------------------------<  206<H>  4.0   |  22  |  1.38<H>    Ca    7.3<L>      30 Oct 2023 05:05        Urinalysis Basic - ( 30 Oct 2023 05:05 )    Color: x / Appearance: x / SG: x / pH: x  Gluc: 206 mg/dL / Ketone: x  / Bili: x / Urobili: x   Blood: x / Protein: x / Nitrite: x   Leuk Esterase: x / RBC: x / WBC x   Sq Epi: x / Non Sq Epi: x / Bacteria: x      CAPILLARY BLOOD GLUCOSE      POCT Blood Glucose.: 246 mg/dL (30 Oct 2023 11:21)  POCT Blood Glucose.: 213 mg/dL (30 Oct 2023 07:52)  POCT Blood Glucose.: 316 mg/dL (29 Oct 2023 21:44)  POCT Blood Glucose.: 337 mg/dL (29 Oct 2023 17:03)  POCT Blood Glucose.: 319 mg/dL (29 Oct 2023 12:46)      RADIOLOGY & ADDITIONAL TESTS:    Imaging Personally Reviewed:  YES/NO    Consultant(s) Notes Reviewed:   YES/ No    Care Discussed with Consultants :     Plan of care was discussed with patient and /or primary care giver; all questions and concerns were addressed and care was aligned with patient's wishes.

## 2023-10-30 NOTE — PROGRESS NOTE ADULT - ASSESSMENT
78M, from home, ambulates with cane, PMH BPH presents after being found on the floor at home by Daughter, noted with slurred speech and AMS.  Was taking cirpo for UTI. Pt reports that previous to this episode, he was suffering from urinary retention for several days and straining to urinate. Also found to have acute renal failure, S/P dialysis He found with large bowl obstruction from inguinal hernia. Sent to ICU for Sepsis 2/2 to UTI and started on zosyn and briefly on pressors. Encephalopathy improved, hemodynamically stable, and hernia was manually reduced, NGT removed, and started on diet. Pt downgraded to medicine.   Hospitalization complicated by urinary retention, failed TOV on 10/29 and guerra replaced.  Medically optimized for discharge to [facility] pending transport auth.

## 2023-10-31 VITALS
RESPIRATION RATE: 18 BRPM | TEMPERATURE: 98 F | DIASTOLIC BLOOD PRESSURE: 68 MMHG | SYSTOLIC BLOOD PRESSURE: 159 MMHG | OXYGEN SATURATION: 98 % | HEART RATE: 67 BPM

## 2023-10-31 LAB
GLUCOSE BLDC GLUCOMTR-MCNC: 211 MG/DL — HIGH (ref 70–99)
GLUCOSE BLDC GLUCOMTR-MCNC: 211 MG/DL — HIGH (ref 70–99)

## 2023-10-31 RX ADMIN — HEPARIN SODIUM 5000 UNIT(S): 5000 INJECTION INTRAVENOUS; SUBCUTANEOUS at 06:41

## 2023-10-31 RX ADMIN — PANTOPRAZOLE SODIUM 40 MILLIGRAM(S): 20 TABLET, DELAYED RELEASE ORAL at 06:17

## 2023-10-31 RX ADMIN — AMLODIPINE BESYLATE 5 MILLIGRAM(S): 2.5 TABLET ORAL at 06:17

## 2023-10-31 RX ADMIN — Medication 1 APPLICATION(S): at 06:29

## 2023-10-31 RX ADMIN — CHLORHEXIDINE GLUCONATE 1 APPLICATION(S): 213 SOLUTION TOPICAL at 06:29

## 2023-10-31 RX ADMIN — Medication 4: at 08:17

## 2023-10-31 NOTE — CHART NOTE - NSCHARTNOTESELECT_GEN_ALL_CORE
Event Note
Event Note
hematuria/Event Note
Arterial line removed/Event Note
Event Note
Transfer Note
discharge planning/Event Note

## 2023-10-31 NOTE — DISCHARGE NOTE NURSING/CASE MANAGEMENT/SOCIAL WORK - NSDCVIVACCINE_GEN_ALL_CORE_FT
Tdap; 23-Jun-2014 03:39; Herminio Smith (LESA); [id number]; IntraMuscular; Deltoid Left.; 0.5 milliLiter(s);

## 2023-10-31 NOTE — DISCHARGE NOTE NURSING/CASE MANAGEMENT/SOCIAL WORK - PATIENT PORTAL LINK FT
You can access the FollowMyHealth Patient Portal offered by [facility] by registering at the following website: [email or web address]. By joining [facility]’s FollowMyHealth portal, you will also be able to view your health information using other applications (apps) compatible with our system.

## 2023-10-31 NOTE — CHART NOTE - NSCHARTNOTEFT_GEN_A_CORE
Pt for discharge to [facility], spoke to pt's daughter and updated on discharge plans for today     D/C discussed with Dr. Leija

## 2023-11-13 PROCEDURE — 84165 PROTEIN E-PHORESIS SERUM: CPT

## 2023-11-13 PROCEDURE — 82962 GLUCOSE BLOOD TEST: CPT

## 2023-11-13 PROCEDURE — 36415 COLL VENOUS BLD VENIPUNCTURE: CPT

## 2023-11-13 PROCEDURE — 83930 ASSAY OF BLOOD OSMOLALITY: CPT

## 2023-11-13 PROCEDURE — 87640 STAPH A DNA AMP PROBE: CPT

## 2023-11-13 PROCEDURE — 85613 RUSSELL VIPER VENOM DILUTED: CPT

## 2023-11-13 PROCEDURE — 99285 EMERGENCY DEPT VISIT HI MDM: CPT | Mod: 25

## 2023-11-13 PROCEDURE — 97116 GAIT TRAINING THERAPY: CPT

## 2023-11-13 PROCEDURE — 86038 ANTINUCLEAR ANTIBODIES: CPT

## 2023-11-13 PROCEDURE — 70496 CT ANGIOGRAPHY HEAD: CPT | Mod: MA

## 2023-11-13 PROCEDURE — 84295 ASSAY OF SERUM SODIUM: CPT

## 2023-11-13 PROCEDURE — 85652 RBC SED RATE AUTOMATED: CPT

## 2023-11-13 PROCEDURE — 82550 ASSAY OF CK (CPK): CPT

## 2023-11-13 PROCEDURE — 93005 ELECTROCARDIOGRAM TRACING: CPT

## 2023-11-13 PROCEDURE — 84540 ASSAY OF URINE/UREA-N: CPT

## 2023-11-13 PROCEDURE — 80307 DRUG TEST PRSMV CHEM ANLYZR: CPT

## 2023-11-13 PROCEDURE — 81001 URINALYSIS AUTO W/SCOPE: CPT

## 2023-11-13 PROCEDURE — 83735 ASSAY OF MAGNESIUM: CPT

## 2023-11-13 PROCEDURE — 96374 THER/PROPH/DIAG INJ IV PUSH: CPT

## 2023-11-13 PROCEDURE — 87086 URINE CULTURE/COLONY COUNT: CPT

## 2023-11-13 PROCEDURE — 36556 INSERT NON-TUNNEL CV CATH: CPT

## 2023-11-13 PROCEDURE — 86140 C-REACTIVE PROTEIN: CPT

## 2023-11-13 PROCEDURE — 99261: CPT

## 2023-11-13 PROCEDURE — 85610 PROTHROMBIN TIME: CPT

## 2023-11-13 PROCEDURE — 87040 BLOOD CULTURE FOR BACTERIA: CPT

## 2023-11-13 PROCEDURE — 84132 ASSAY OF SERUM POTASSIUM: CPT

## 2023-11-13 PROCEDURE — 87641 MR-STAPH DNA AMP PROBE: CPT

## 2023-11-13 PROCEDURE — 86334 IMMUNOFIX E-PHORESIS SERUM: CPT

## 2023-11-13 PROCEDURE — 84156 ASSAY OF PROTEIN URINE: CPT

## 2023-11-13 PROCEDURE — 82803 BLOOD GASES ANY COMBINATION: CPT

## 2023-11-13 PROCEDURE — 84100 ASSAY OF PHOSPHORUS: CPT

## 2023-11-13 PROCEDURE — 71045 X-RAY EXAM CHEST 1 VIEW: CPT

## 2023-11-13 PROCEDURE — 82330 ASSAY OF CALCIUM: CPT

## 2023-11-13 PROCEDURE — 71250 CT THORAX DX C-: CPT | Mod: MA

## 2023-11-13 PROCEDURE — 84300 ASSAY OF URINE SODIUM: CPT

## 2023-11-13 PROCEDURE — G1004: CPT

## 2023-11-13 PROCEDURE — 93306 TTE W/DOPPLER COMPLETE: CPT

## 2023-11-13 PROCEDURE — 86900 BLOOD TYPING SEROLOGIC ABO: CPT

## 2023-11-13 PROCEDURE — 70450 CT HEAD/BRAIN W/O DYE: CPT | Mod: MA

## 2023-11-13 PROCEDURE — 86160 COMPLEMENT ANTIGEN: CPT

## 2023-11-13 PROCEDURE — 80074 ACUTE HEPATITIS PANEL: CPT

## 2023-11-13 PROCEDURE — 97110 THERAPEUTIC EXERCISES: CPT

## 2023-11-13 PROCEDURE — 83605 ASSAY OF LACTIC ACID: CPT

## 2023-11-13 PROCEDURE — 97530 THERAPEUTIC ACTIVITIES: CPT

## 2023-11-13 PROCEDURE — 76775 US EXAM ABDO BACK WALL LIM: CPT

## 2023-11-13 PROCEDURE — 74176 CT ABD & PELVIS W/O CONTRAST: CPT | Mod: MG

## 2023-11-13 PROCEDURE — 85730 THROMBOPLASTIN TIME PARTIAL: CPT

## 2023-11-13 PROCEDURE — 85025 COMPLETE CBC W/AUTO DIFF WBC: CPT

## 2023-11-13 PROCEDURE — 86147 CARDIOLIPIN ANTIBODY EA IG: CPT

## 2023-11-13 PROCEDURE — 86036 ANCA SCREEN EACH ANTIBODY: CPT

## 2023-11-13 PROCEDURE — 80053 COMPREHEN METABOLIC PANEL: CPT

## 2023-11-13 PROCEDURE — 0042T: CPT | Mod: MA

## 2023-11-13 PROCEDURE — 70498 CT ANGIOGRAPHY NECK: CPT | Mod: MA

## 2023-11-13 PROCEDURE — 97162 PT EVAL MOD COMPLEX 30 MIN: CPT

## 2023-11-13 PROCEDURE — 80048 BASIC METABOLIC PNL TOTAL CA: CPT

## 2023-11-13 PROCEDURE — 74018 RADEX ABDOMEN 1 VIEW: CPT

## 2023-11-13 PROCEDURE — 86146 BETA-2 GLYCOPROTEIN ANTIBODY: CPT

## 2023-11-13 PROCEDURE — 83935 ASSAY OF URINE OSMOLALITY: CPT

## 2023-11-13 PROCEDURE — 87389 HIV-1 AG W/HIV-1&-2 AB AG IA: CPT

## 2023-11-13 PROCEDURE — 85732 THROMBOPLASTIN TIME PARTIAL: CPT

## 2023-11-13 PROCEDURE — 83036 HEMOGLOBIN GLYCOSYLATED A1C: CPT

## 2023-11-13 PROCEDURE — 86850 RBC ANTIBODY SCREEN: CPT

## 2023-11-13 PROCEDURE — C1751: CPT

## 2023-11-13 PROCEDURE — 84484 ASSAY OF TROPONIN QUANT: CPT

## 2023-11-13 PROCEDURE — 82570 ASSAY OF URINE CREATININE: CPT

## 2023-11-13 PROCEDURE — 84133 ASSAY OF URINE POTASSIUM: CPT

## 2023-11-13 PROCEDURE — 86901 BLOOD TYPING SEROLOGIC RH(D): CPT

## 2023-11-13 PROCEDURE — 85027 COMPLETE CBC AUTOMATED: CPT

## 2023-11-13 PROCEDURE — 84155 ASSAY OF PROTEIN SERUM: CPT

## 2024-09-17 NOTE — ED ADULT NURSE NOTE - NS ED NURSE PATIENT LEFT UNIT TIME
[FreeTextEntry1] : Chest radiograph Symptoms 17 2024 attached.  Decrease in adenopathy compared to prior studies dating back to October 2023.  09/17/2024 Pulmonary function testing FEV1, FVC, and FEV1/FVC are within normal limits. TLC and subdivisions are normal. RV/TLC ratio is normal. Single breath diffusion capacity is normal.  Stable function.   1 / 3 Devin Kee  Report date:10/8/2023 View Order (Report matches exam selected in Patient History pane)     ACC: [id number] EXAM: CT ABDOMEN AND PELVIS IC ORDERED BY: YEN MONTAÑO  ACC: [id number] EXAM: CT ANGIO CHEST PULM ART WAWIC ORDERED BY: YEN MONTAÑO  PROCEDURE DATE: 10/08/2023    INTERPRETATION: CLINICAL INFORMATION: Chest pain. Shortness of breath. Abdominal pain.  COMPARISON: None.  CONTRAST/COMPLICATIONS: IV Contrast: IV contrast documented in unlinked concurrent exam (accession [id number]), Omnipaque 350 (accession [id number]) 90 cc administered 10 cc discarded Oral Contrast: NONE Complications: None reported at time of study completion  PROCEDURE: CT Angiography of the Chest was performed followed by portal venous phase imaging of the Abdomen and Pelvis. Sagittal and coronal reformats were performed as well as 3D (MIP) reconstructions.  FINDINGS: CHEST: LUNGS AND LARGE AIRWAYS: Patent central airways. Left lower lobe consolidation. Lungs otherwise clear. PLEURA: No pleural effusion. VESSELS: No pulmonary embolus. No thoracic aortic dissection or aneurysm. HEART: Heart size is normal. No pericardial effusion. MEDIASTINUM AND ZHANE: Moderate subcarinal and bilateral hilar lymphadenopathy. CHEST WALL AND LOWER NECK: Within normal limits.  ABDOMEN AND PELVIS: LIVER: Within normal limits. BILE DUCTS: Normal caliber. GALLBLADDER: Within normal limits. SPLEEN: Within normal limits. PANCREAS: Within normal limits. ADRENALS: Within normal limits. KIDNEYS/URETERS: Within normal limits.  BLADDER: Within normal limits. REPRODUCTIVE ORGANS: Uterus and adnexa within normal limits.  BOWEL: No bowel obstruction. Appendix not identified, no ancillary findings appendicitis. PERITONEUM: No ascites. VESSELS: Within normal limits. RETROPERITONEUM/LYMPH NODES: No lymphadenopathy. ABDOMINAL WALL: Within normal limits. BONES: Within normal limits.  IMPRESSION: No evidence of pulmonary embolism.  Left lower lobe pneumonia.  Moderate subcarinal and bilateral hilar lymphadenopathy presumably reactive/infectious related to the pneumonia. Sarcoidosis, lymphoma less likely but possible.  Recommend follow-up of both of these findings to resolution to exclude malignancy.  No acute findings in the abdomen or pelvis.  --- End of Report ---     ROBERT MILLS MD; Resident Radiologist This document has been electronically signed. DEVIN KEE MD; Attending Radiologist This document has been electronically signed. Oct 8 2023 5:45AM 
13:30